# Patient Record
Sex: MALE | URBAN - METROPOLITAN AREA
[De-identification: names, ages, dates, MRNs, and addresses within clinical notes are randomized per-mention and may not be internally consistent; named-entity substitution may affect disease eponyms.]

---

## 2018-01-08 ENCOUNTER — EMERGENCY (EMERGENCY)
Facility: HOSPITAL | Age: 68
LOS: 1 days | Discharge: HOME | End: 2018-01-08
Attending: EMERGENCY MEDICINE
Payer: MEDICARE

## 2018-01-08 VITALS
HEART RATE: 80 BPM | DIASTOLIC BLOOD PRESSURE: 75 MMHG | OXYGEN SATURATION: 97 % | RESPIRATION RATE: 19 BRPM | SYSTOLIC BLOOD PRESSURE: 133 MMHG

## 2018-01-08 VITALS
TEMPERATURE: 97 F | HEIGHT: 71 IN | HEART RATE: 101 BPM | SYSTOLIC BLOOD PRESSURE: 110 MMHG | WEIGHT: 190.04 LBS | DIASTOLIC BLOOD PRESSURE: 74 MMHG | RESPIRATION RATE: 20 BRPM | OXYGEN SATURATION: 99 %

## 2018-01-08 DIAGNOSIS — Z98.89 OTHER SPECIFIED POSTPROCEDURAL STATES: Chronic | ICD-10-CM

## 2018-01-08 LAB
ALBUMIN SERPL ELPH-MCNC: 3.9 G/DL — SIGNIFICANT CHANGE UP (ref 3.5–5)
ALP SERPL-CCNC: 109 U/L — SIGNIFICANT CHANGE UP (ref 40–120)
ALT FLD-CCNC: 28 U/L DA — SIGNIFICANT CHANGE UP (ref 10–60)
ANION GAP SERPL CALC-SCNC: 10 MMOL/L — SIGNIFICANT CHANGE UP (ref 5–17)
AST SERPL-CCNC: 17 U/L — SIGNIFICANT CHANGE UP (ref 10–40)
BASOPHILS # BLD AUTO: 0.1 K/UL — SIGNIFICANT CHANGE UP (ref 0–0.2)
BASOPHILS NFR BLD AUTO: 0.8 % — SIGNIFICANT CHANGE UP (ref 0–2)
BILIRUB SERPL-MCNC: 0.3 MG/DL — SIGNIFICANT CHANGE UP (ref 0.2–1.2)
BUN SERPL-MCNC: 13 MG/DL — SIGNIFICANT CHANGE UP (ref 7–18)
CALCIUM SERPL-MCNC: 8.5 MG/DL — SIGNIFICANT CHANGE UP (ref 8.4–10.5)
CHLORIDE SERPL-SCNC: 107 MMOL/L — SIGNIFICANT CHANGE UP (ref 96–108)
CO2 SERPL-SCNC: 24 MMOL/L — SIGNIFICANT CHANGE UP (ref 22–31)
CREAT SERPL-MCNC: 0.7 MG/DL — SIGNIFICANT CHANGE UP (ref 0.5–1.3)
EOSINOPHIL # BLD AUTO: 0.1 K/UL — SIGNIFICANT CHANGE UP (ref 0–0.5)
EOSINOPHIL NFR BLD AUTO: 1.3 % — SIGNIFICANT CHANGE UP (ref 0–6)
GLUCOSE SERPL-MCNC: 91 MG/DL — SIGNIFICANT CHANGE UP (ref 70–99)
HCT VFR BLD CALC: 48.4 % — SIGNIFICANT CHANGE UP (ref 39–50)
HGB BLD-MCNC: 15.3 G/DL — SIGNIFICANT CHANGE UP (ref 13–17)
LYMPHOCYTES # BLD AUTO: 2.3 K/UL — SIGNIFICANT CHANGE UP (ref 1–3.3)
LYMPHOCYTES # BLD AUTO: 29.7 % — SIGNIFICANT CHANGE UP (ref 13–44)
MCHC RBC-ENTMCNC: 29.6 PG — SIGNIFICANT CHANGE UP (ref 27–34)
MCHC RBC-ENTMCNC: 31.7 GM/DL — LOW (ref 32–36)
MCV RBC AUTO: 93.2 FL — SIGNIFICANT CHANGE UP (ref 80–100)
MONOCYTES # BLD AUTO: 0.6 K/UL — SIGNIFICANT CHANGE UP (ref 0–0.9)
MONOCYTES NFR BLD AUTO: 7.5 % — SIGNIFICANT CHANGE UP (ref 2–14)
NEUTROPHILS # BLD AUTO: 4.8 K/UL — SIGNIFICANT CHANGE UP (ref 1.8–7.4)
NEUTROPHILS NFR BLD AUTO: 60.8 % — SIGNIFICANT CHANGE UP (ref 43–77)
NT-PROBNP SERPL-SCNC: 60 PG/ML — SIGNIFICANT CHANGE UP (ref 0–125)
PLATELET # BLD AUTO: 248 K/UL — SIGNIFICANT CHANGE UP (ref 150–400)
POTASSIUM SERPL-MCNC: 4 MMOL/L — SIGNIFICANT CHANGE UP (ref 3.5–5.3)
POTASSIUM SERPL-SCNC: 4 MMOL/L — SIGNIFICANT CHANGE UP (ref 3.5–5.3)
PROT SERPL-MCNC: 7.3 G/DL — SIGNIFICANT CHANGE UP (ref 6–8.3)
RBC # BLD: 5.19 M/UL — SIGNIFICANT CHANGE UP (ref 4.2–5.8)
RBC # FLD: 13.1 % — SIGNIFICANT CHANGE UP (ref 10.3–14.5)
SODIUM SERPL-SCNC: 141 MMOL/L — SIGNIFICANT CHANGE UP (ref 135–145)
WBC # BLD: 7.8 K/UL — SIGNIFICANT CHANGE UP (ref 3.8–10.5)
WBC # FLD AUTO: 7.8 K/UL — SIGNIFICANT CHANGE UP (ref 3.8–10.5)

## 2018-01-08 PROCEDURE — 71045 X-RAY EXAM CHEST 1 VIEW: CPT | Mod: 26

## 2018-01-08 PROCEDURE — 99283 EMERGENCY DEPT VISIT LOW MDM: CPT

## 2018-01-08 NOTE — ED PROVIDER NOTE - CHPI ED SYMPTOMS NEG
no diaphoresis/no hemoptysis/no headache/no edema/no body aches/no chest pain/no fever/no wheezing/no chills

## 2018-01-08 NOTE — ED ADULT NURSE NOTE - ED STAT RN HANDOFF DETAILS
endorsed to next nurse Doroteo endorsed to next nurse Doroteo - awaiting for ambulance to pick him up

## 2018-01-08 NOTE — ED PROVIDER NOTE - OBJECTIVE STATEMENT
"he was short of breat"  pt has history of cva and has HHA 24/7 at home.  pt has guardian ship no family  brought in today because had an episode of coughing and sob today  in ed pt states "i'm okay"  no cp no sob now  no pain anywhere HHA at bedside

## 2018-01-10 ENCOUNTER — EMERGENCY (EMERGENCY)
Facility: HOSPITAL | Age: 68
LOS: 1 days | Discharge: HOME | End: 2018-01-10
Attending: EMERGENCY MEDICINE
Payer: MEDICARE

## 2018-01-10 VITALS
SYSTOLIC BLOOD PRESSURE: 123 MMHG | RESPIRATION RATE: 18 BRPM | DIASTOLIC BLOOD PRESSURE: 71 MMHG | TEMPERATURE: 98 F | HEART RATE: 97 BPM | OXYGEN SATURATION: 96 %

## 2018-01-10 VITALS
OXYGEN SATURATION: 98 % | TEMPERATURE: 98 F | SYSTOLIC BLOOD PRESSURE: 142 MMHG | HEART RATE: 82 BPM | RESPIRATION RATE: 18 BRPM | DIASTOLIC BLOOD PRESSURE: 87 MMHG

## 2018-01-10 DIAGNOSIS — Z98.89 OTHER SPECIFIED POSTPROCEDURAL STATES: Chronic | ICD-10-CM

## 2018-01-10 PROCEDURE — 12011 RPR F/E/E/N/L/M 2.5 CM/<: CPT

## 2018-01-10 PROCEDURE — 99284 EMERGENCY DEPT VISIT MOD MDM: CPT | Mod: 25

## 2018-01-10 PROCEDURE — 71045 X-RAY EXAM CHEST 1 VIEW: CPT

## 2018-01-10 PROCEDURE — 80053 COMPREHEN METABOLIC PANEL: CPT

## 2018-01-10 PROCEDURE — 72125 CT NECK SPINE W/O DYE: CPT

## 2018-01-10 PROCEDURE — 85027 COMPLETE CBC AUTOMATED: CPT

## 2018-01-10 PROCEDURE — 72125 CT NECK SPINE W/O DYE: CPT | Mod: 26

## 2018-01-10 PROCEDURE — 70450 CT HEAD/BRAIN W/O DYE: CPT | Mod: 26

## 2018-01-10 PROCEDURE — 83880 ASSAY OF NATRIURETIC PEPTIDE: CPT

## 2018-01-10 PROCEDURE — 72170 X-RAY EXAM OF PELVIS: CPT

## 2018-01-10 PROCEDURE — 90715 TDAP VACCINE 7 YRS/> IM: CPT

## 2018-01-10 PROCEDURE — 72170 X-RAY EXAM OF PELVIS: CPT | Mod: 26

## 2018-01-10 PROCEDURE — 36000 PLACE NEEDLE IN VEIN: CPT

## 2018-01-10 PROCEDURE — 82962 GLUCOSE BLOOD TEST: CPT

## 2018-01-10 PROCEDURE — 93005 ELECTROCARDIOGRAM TRACING: CPT

## 2018-01-10 PROCEDURE — 71045 X-RAY EXAM CHEST 1 VIEW: CPT | Mod: 26

## 2018-01-10 PROCEDURE — 70450 CT HEAD/BRAIN W/O DYE: CPT

## 2018-01-10 PROCEDURE — 90471 IMMUNIZATION ADMIN: CPT

## 2018-01-10 RX ORDER — TETANUS TOXOID, REDUCED DIPHTHERIA TOXOID AND ACELLULAR PERTUSSIS VACCINE, ADSORBED 5; 2.5; 8; 8; 2.5 [IU]/.5ML; [IU]/.5ML; UG/.5ML; UG/.5ML; UG/.5ML
0.5 SUSPENSION INTRAMUSCULAR ONCE
Qty: 0 | Refills: 0 | Status: COMPLETED | OUTPATIENT
Start: 2018-01-10 | End: 2018-01-10

## 2018-01-10 RX ADMIN — TETANUS TOXOID, REDUCED DIPHTHERIA TOXOID AND ACELLULAR PERTUSSIS VACCINE, ADSORBED 0.5 MILLILITER(S): 5; 2.5; 8; 8; 2.5 SUSPENSION INTRAMUSCULAR at 15:32

## 2018-01-10 NOTE — ED PROVIDER NOTE - PROGRESS NOTE DETAILS
Maza: lac repaired.  wound care instructions given.  ct head neg/xr neg.  pt tolerated puree food in ed. ekg no arrhythmia  dx mechanical fall with scalp laceration. wound care instructions with suture removal in 7 days. HHA informed. ambulette ordered.

## 2018-01-10 NOTE — ED PROVIDER NOTE - MEDICAL DECISION MAKING DETAILS
Dry/Warm
67 yr old male from home with hx of CVA and rt eye blindness presents to ed with HHA after being found on floor next to bed.  pt was dc from ED [place] 1/8/18 late night.  sustained lac to left frontal forehead today. HHA at bedside states pt at baseline- slow to respond.  pt offers no complains except for pain at left frontal forehead from lac.    pt with fall now forehead laceration. will repair and obtai nct head, cervical, cxr and pelvis.

## 2018-01-10 NOTE — ED ADULT NURSE NOTE - NSHISCREENINGQ1_ED_A_ED
Date of Surgery Update: Bull Bateman was seen and examined. History and physical has been reviewed. The patient has been examined.  There have been no significant clinical changes since the completion of the originally dated History and Physical.    Signed By: Demond Edwards MD     January 17, 2017 8:36 AM
No

## 2018-01-10 NOTE — ED PROVIDER NOTE - OBJECTIVE STATEMENT
67 yr old male from home with hx of CVA and rt eye blindness presents to ed with HHA after being found on floor next to bed.  pt was dc from ED [place] 1/8/18 late night.  sustained lac to left frontal forehead today. HHA at bedside states pt at baseline- slow to respond.  pt offers no complains except for pain at left frontal forehead from lac.

## 2018-01-10 NOTE — ED PROVIDER NOTE - NEUROLOGICAL, MLM
Alert, moving all extremity, right arm slightly weaker, Alert, moving all extremity, right arm slightly weaker, otherwise moving all extremity

## 2018-01-10 NOTE — ED PROVIDER NOTE - EYES, MLM
Clear bilaterally, pupils equal, round and reactive to light. Clear bilaterally, pupils equal, round and reactive to light. right eye blind

## 2018-01-10 NOTE — ED ADULT NURSE NOTE - OBJECTIVE STATEMENT
as per aide at bedside she step a way from his side to get something and patient fell out of the bed this morning hitting his head , no LOC

## 2018-01-30 ENCOUNTER — EMERGENCY (EMERGENCY)
Facility: HOSPITAL | Age: 68
LOS: 1 days | Discharge: HOME | End: 2018-01-30
Attending: EMERGENCY MEDICINE
Payer: MEDICARE

## 2018-01-30 VITALS
OXYGEN SATURATION: 99 % | SYSTOLIC BLOOD PRESSURE: 158 MMHG | HEART RATE: 78 BPM | HEIGHT: 73 IN | TEMPERATURE: 98 F | WEIGHT: 160.06 LBS | RESPIRATION RATE: 16 BRPM | DIASTOLIC BLOOD PRESSURE: 98 MMHG

## 2018-01-30 DIAGNOSIS — Z98.89 OTHER SPECIFIED POSTPROCEDURAL STATES: Chronic | ICD-10-CM

## 2018-01-30 LAB
ANION GAP SERPL CALC-SCNC: 5 MMOL/L — SIGNIFICANT CHANGE UP (ref 5–17)
BASOPHILS # BLD AUTO: 0.1 K/UL — SIGNIFICANT CHANGE UP (ref 0–0.2)
BASOPHILS NFR BLD AUTO: 0.9 % — SIGNIFICANT CHANGE UP (ref 0–2)
BUN SERPL-MCNC: 15 MG/DL — SIGNIFICANT CHANGE UP (ref 7–18)
CALCIUM SERPL-MCNC: 8.9 MG/DL — SIGNIFICANT CHANGE UP (ref 8.4–10.5)
CHLORIDE SERPL-SCNC: 110 MMOL/L — HIGH (ref 96–108)
CO2 SERPL-SCNC: 28 MMOL/L — SIGNIFICANT CHANGE UP (ref 22–31)
CREAT SERPL-MCNC: 0.74 MG/DL — SIGNIFICANT CHANGE UP (ref 0.5–1.3)
EOSINOPHIL # BLD AUTO: 0.1 K/UL — SIGNIFICANT CHANGE UP (ref 0–0.5)
EOSINOPHIL NFR BLD AUTO: 1.3 % — SIGNIFICANT CHANGE UP (ref 0–6)
GLUCOSE SERPL-MCNC: 92 MG/DL — SIGNIFICANT CHANGE UP (ref 70–99)
HCT VFR BLD CALC: 43.6 % — SIGNIFICANT CHANGE UP (ref 39–50)
HGB BLD-MCNC: 14.4 G/DL — SIGNIFICANT CHANGE UP (ref 13–17)
LACTATE SERPL-SCNC: 0.9 MMOL/L — SIGNIFICANT CHANGE UP (ref 0.7–2)
LYMPHOCYTES # BLD AUTO: 2.6 K/UL — SIGNIFICANT CHANGE UP (ref 1–3.3)
LYMPHOCYTES # BLD AUTO: 31.4 % — SIGNIFICANT CHANGE UP (ref 13–44)
MCHC RBC-ENTMCNC: 31 PG — SIGNIFICANT CHANGE UP (ref 27–34)
MCHC RBC-ENTMCNC: 33 GM/DL — SIGNIFICANT CHANGE UP (ref 32–36)
MCV RBC AUTO: 93.9 FL — SIGNIFICANT CHANGE UP (ref 80–100)
MONOCYTES # BLD AUTO: 0.5 K/UL — SIGNIFICANT CHANGE UP (ref 0–0.9)
MONOCYTES NFR BLD AUTO: 6.2 % — SIGNIFICANT CHANGE UP (ref 2–14)
NEUTROPHILS # BLD AUTO: 5.1 K/UL — SIGNIFICANT CHANGE UP (ref 1.8–7.4)
NEUTROPHILS NFR BLD AUTO: 60.2 % — SIGNIFICANT CHANGE UP (ref 43–77)
PLATELET # BLD AUTO: 222 K/UL — SIGNIFICANT CHANGE UP (ref 150–400)
POTASSIUM SERPL-MCNC: 4.4 MMOL/L — SIGNIFICANT CHANGE UP (ref 3.5–5.3)
POTASSIUM SERPL-SCNC: 4.4 MMOL/L — SIGNIFICANT CHANGE UP (ref 3.5–5.3)
RBC # BLD: 4.64 M/UL — SIGNIFICANT CHANGE UP (ref 4.2–5.8)
RBC # FLD: 13.5 % — SIGNIFICANT CHANGE UP (ref 10.3–14.5)
SODIUM SERPL-SCNC: 143 MMOL/L — SIGNIFICANT CHANGE UP (ref 135–145)
WBC # BLD: 8.4 K/UL — SIGNIFICANT CHANGE UP (ref 3.8–10.5)
WBC # FLD AUTO: 8.4 K/UL — SIGNIFICANT CHANGE UP (ref 3.8–10.5)

## 2018-01-30 PROCEDURE — 99285 EMERGENCY DEPT VISIT HI MDM: CPT

## 2018-01-30 PROCEDURE — 11720 DEBRIDE NAIL 1-5: CPT

## 2018-01-30 PROCEDURE — 99284 EMERGENCY DEPT VISIT MOD MDM: CPT | Mod: 25

## 2018-01-30 PROCEDURE — 73620 X-RAY EXAM OF FOOT: CPT

## 2018-01-30 PROCEDURE — 80048 BASIC METABOLIC PNL TOTAL CA: CPT

## 2018-01-30 PROCEDURE — 85027 COMPLETE CBC AUTOMATED: CPT

## 2018-01-30 PROCEDURE — 73620 X-RAY EXAM OF FOOT: CPT | Mod: 26,LT

## 2018-01-30 PROCEDURE — 83605 ASSAY OF LACTIC ACID: CPT

## 2018-01-30 NOTE — CONSULT NOTE ADULT - SUBJECTIVE AND OBJECTIVE BOX
S : 67y year old Male seen at bedside for left toe pain. Patient has a history of Parkinsons according to aide and is a poor historian. According to the aide, patients toe was seen to be covered in blood on Saturday. Patients aide states that he bumped his toe and since then has had brusing and blood at the toe. Patients aide is unable to give any other history as far as medical history is concerned     Chief Complaint : Patient is a 67y old  Male who presents with a chief complaint of   HPI : HPI: Left hallux pain       Patient admits to  (-) Fevers, (-) Chills, (-) Nausea, (-) Vomiting, (-) Shortness of Breath      PMH: Blindness of right eye  CVA (cerebral vascular accident)    PSH:History of laminectomy      Allergies:No Known Allergies      Labs:      WBC Trend  7.8 Date (01-08 @ 16:42)      Chem              T(F): 98.5 (01-30-18 @ 15:08), Max: 98.5 (01-30-18 @ 15:08)  HR: 78 (01-30-18 @ 15:08) (78 - 78)  BP: 158/98 (01-30-18 @ 15:08) (158/98 - 158/98)  RR: 16 (01-30-18 @ 15:08) (16 - 16)  SpO2: 99% (01-30-18 @ 15:08) (99% - 99%)  Wt(kg): --    O:   General: Pleasant  male NAD & AOX3.    Integument:  Skin warm, dry and supple bilateral.    Left hallux:  laceration at distal nail with underlying hematoma present,  + edema, - garry-wound erythema, + purulence, - fluctuance, - tracking/tunneling, - probe to bone.   Vascular: Dorsalis Pedis and Posterior Tibial pulses 1/4 bilaterally.  Capillary re-fill time less then 3 seconds digits 1-5 bilateral.    Neuro: Protective sensation intact/diminished to the level of the digits bilateral.  MSK: Muscle strength 5/5 all major muscle groups bilateral.  Deformity:  A: Left hallux distal laceration       P:   Chart reviewed and Patient evaluated  Discussed diagnosis and treatment with patient  Wound flush with normal saline  Mycotic toenail debrided to adequate length   Applied  dry sterile dressing  X-rays reviewed   Recommend PO antibiotics   Offloading to bilateral Heels.   Discussed importance of daily foot examinations and proper shoe gear and to importance of lower Fasting Blood Glucose levels.   Follow up in Podiatry clinic   Discussed with Dr. Hou

## 2018-01-30 NOTE — ED PROVIDER NOTE - OBJECTIVE STATEMENT
66 y/o M pt w/ PMHx of CVA and legally blind presents to ED c/o L 1st toe infection. Pt here w/ visiting nurse and as per nurse pt has been c/o increased pain which is why he was brought in  for evaluation. Pt denies fever, chills, or any other complaints. NKDA.

## 2018-01-30 NOTE — ED ADULT NURSE NOTE - OBJECTIVE STATEMENT
Pt presented to ED c/o Left great toe infection, was seen by ED physician and podiatrist, labs done and xray to left great toe

## 2018-06-05 ENCOUNTER — INPATIENT (INPATIENT)
Facility: HOSPITAL | Age: 68
LOS: 7 days | Discharge: HOME | DRG: 392 | End: 2018-06-13
Attending: INTERNAL MEDICINE | Admitting: INTERNAL MEDICINE
Payer: MEDICARE

## 2018-06-05 VITALS
HEIGHT: 71 IN | OXYGEN SATURATION: 99 % | WEIGHT: 179.9 LBS | TEMPERATURE: 99 F | SYSTOLIC BLOOD PRESSURE: 132 MMHG | HEART RATE: 100 BPM | RESPIRATION RATE: 16 BRPM | DIASTOLIC BLOOD PRESSURE: 78 MMHG

## 2018-06-05 DIAGNOSIS — Z98.89 OTHER SPECIFIED POSTPROCEDURAL STATES: Chronic | ICD-10-CM

## 2018-06-05 DIAGNOSIS — K59.00 CONSTIPATION, UNSPECIFIED: ICD-10-CM

## 2018-06-05 LAB
ALBUMIN SERPL ELPH-MCNC: 3.9 G/DL — SIGNIFICANT CHANGE UP (ref 3.5–5)
ALP SERPL-CCNC: 102 U/L — SIGNIFICANT CHANGE UP (ref 40–120)
ALT FLD-CCNC: 24 U/L DA — SIGNIFICANT CHANGE UP (ref 10–60)
ANION GAP SERPL CALC-SCNC: 6 MMOL/L — SIGNIFICANT CHANGE UP (ref 5–17)
AST SERPL-CCNC: 13 U/L — SIGNIFICANT CHANGE UP (ref 10–40)
BASOPHILS # BLD AUTO: 0 K/UL — SIGNIFICANT CHANGE UP (ref 0–0.2)
BASOPHILS NFR BLD AUTO: 0.8 % — SIGNIFICANT CHANGE UP (ref 0–2)
BILIRUB SERPL-MCNC: 0.4 MG/DL — SIGNIFICANT CHANGE UP (ref 0.2–1.2)
BUN SERPL-MCNC: 14 MG/DL — SIGNIFICANT CHANGE UP (ref 7–18)
CALCIUM SERPL-MCNC: 8.8 MG/DL — SIGNIFICANT CHANGE UP (ref 8.4–10.5)
CHLORIDE SERPL-SCNC: 112 MMOL/L — HIGH (ref 96–108)
CO2 SERPL-SCNC: 28 MMOL/L — SIGNIFICANT CHANGE UP (ref 22–31)
CREAT SERPL-MCNC: 0.81 MG/DL — SIGNIFICANT CHANGE UP (ref 0.5–1.3)
EOSINOPHIL # BLD AUTO: 0.1 K/UL — SIGNIFICANT CHANGE UP (ref 0–0.5)
EOSINOPHIL NFR BLD AUTO: 1.1 % — SIGNIFICANT CHANGE UP (ref 0–6)
GLUCOSE SERPL-MCNC: 105 MG/DL — HIGH (ref 70–99)
HCT VFR BLD CALC: 45.5 % — SIGNIFICANT CHANGE UP (ref 39–50)
HGB BLD-MCNC: 14.7 G/DL — SIGNIFICANT CHANGE UP (ref 13–17)
LIDOCAIN IGE QN: 286 U/L — SIGNIFICANT CHANGE UP (ref 73–393)
LYMPHOCYTES # BLD AUTO: 2.1 K/UL — SIGNIFICANT CHANGE UP (ref 1–3.3)
LYMPHOCYTES # BLD AUTO: 32.6 % — SIGNIFICANT CHANGE UP (ref 13–44)
MCHC RBC-ENTMCNC: 29.8 PG — SIGNIFICANT CHANGE UP (ref 27–34)
MCHC RBC-ENTMCNC: 32.4 GM/DL — SIGNIFICANT CHANGE UP (ref 32–36)
MCV RBC AUTO: 92.1 FL — SIGNIFICANT CHANGE UP (ref 80–100)
MONOCYTES # BLD AUTO: 0.6 K/UL — SIGNIFICANT CHANGE UP (ref 0–0.9)
MONOCYTES NFR BLD AUTO: 9.4 % — SIGNIFICANT CHANGE UP (ref 2–14)
NEUTROPHILS # BLD AUTO: 3.5 K/UL — SIGNIFICANT CHANGE UP (ref 1.8–7.4)
NEUTROPHILS NFR BLD AUTO: 56 % — SIGNIFICANT CHANGE UP (ref 43–77)
PLATELET # BLD AUTO: 230 K/UL — SIGNIFICANT CHANGE UP (ref 150–400)
POTASSIUM SERPL-MCNC: 3.7 MMOL/L — SIGNIFICANT CHANGE UP (ref 3.5–5.3)
POTASSIUM SERPL-SCNC: 3.7 MMOL/L — SIGNIFICANT CHANGE UP (ref 3.5–5.3)
PROT SERPL-MCNC: 7.3 G/DL — SIGNIFICANT CHANGE UP (ref 6–8.3)
RBC # BLD: 4.94 M/UL — SIGNIFICANT CHANGE UP (ref 4.2–5.8)
RBC # FLD: 13 % — SIGNIFICANT CHANGE UP (ref 10.3–14.5)
SODIUM SERPL-SCNC: 146 MMOL/L — HIGH (ref 135–145)
WBC # BLD: 6.3 K/UL — SIGNIFICANT CHANGE UP (ref 3.8–10.5)
WBC # FLD AUTO: 6.3 K/UL — SIGNIFICANT CHANGE UP (ref 3.8–10.5)

## 2018-06-05 PROCEDURE — 99285 EMERGENCY DEPT VISIT HI MDM: CPT

## 2018-06-05 PROCEDURE — 74177 CT ABD & PELVIS W/CONTRAST: CPT | Mod: 26

## 2018-06-05 RX ORDER — SODIUM CHLORIDE 9 MG/ML
1000 INJECTION INTRAMUSCULAR; INTRAVENOUS; SUBCUTANEOUS ONCE
Qty: 0 | Refills: 0 | Status: COMPLETED | OUTPATIENT
Start: 2018-06-05 | End: 2018-06-05

## 2018-06-05 RX ORDER — POLYETHYLENE GLYCOL 3350 17 G/17G
17 POWDER, FOR SOLUTION ORAL ONCE
Qty: 0 | Refills: 0 | Status: COMPLETED | OUTPATIENT
Start: 2018-06-05 | End: 2018-06-05

## 2018-06-05 RX ORDER — ACETAMINOPHEN 500 MG
1000 TABLET ORAL ONCE
Qty: 0 | Refills: 0 | Status: COMPLETED | OUTPATIENT
Start: 2018-06-05 | End: 2018-06-05

## 2018-06-05 RX ADMIN — SODIUM CHLORIDE 1000 MILLILITER(S): 9 INJECTION INTRAMUSCULAR; INTRAVENOUS; SUBCUTANEOUS at 17:35

## 2018-06-05 RX ADMIN — Medication 400 MILLIGRAM(S): at 17:35

## 2018-06-05 RX ADMIN — Medication 1 ENEMA: at 21:50

## 2018-06-05 RX ADMIN — Medication 1000 MILLIGRAM(S): at 18:05

## 2018-06-05 NOTE — ED PROVIDER NOTE - PMH
Blindness of right eye    CVA (cerebral vascular accident)    HLD (hyperlipidemia)    HTN (hypertension)    Parkinson's disease

## 2018-06-05 NOTE — ED ADULT NURSE NOTE - ED STAT RN HANDOFF DETAILS
pt.remained   stable.denies pain.  admitted to medicine  for  constipation.medicated with  fleet enema 1 dose,.pending result.  transfer to holding area,report given to sara lewis.pt.not  in distress

## 2018-06-05 NOTE — ED PROVIDER NOTE - OBJECTIVE STATEMENT
66 y/o M pt with PMHx of Blindness of the R Eye, CVA (non-verbal at baseline), HTN, HLD, and Parkinson's Disease and PSHx of Laminectomy BIB home health aide to ED with reported abdominal pain x4 days. Pt's home health aide reports pt's sx's began x4 days ago; home health aide notified her agency at the time but nothing was done, and upon returning to work with the pt yesterday, noticed pt was still experiencing abdominal pain and decided to bring pt into the ED for evaluation. In ED, pt is pointing to the abdomen when asked what hurts. Home health aide denies pt fever, chills, vomiting, diarrhea, or any other complaints. Extended Hx and HPI limited due to pt's condition (non-verbal; Parkinson's disease). Medications: Amlodipine, Aspirin, Atorvastatin, Carbidopa, Levodopa, Colace, Donepezil, Memantine, Miralax, Omeprazole, Plavix, Senna, Trazodone. NKDA.

## 2018-06-06 DIAGNOSIS — K59.00 CONSTIPATION, UNSPECIFIED: ICD-10-CM

## 2018-06-06 DIAGNOSIS — E78.5 HYPERLIPIDEMIA, UNSPECIFIED: ICD-10-CM

## 2018-06-06 DIAGNOSIS — Z29.9 ENCOUNTER FOR PROPHYLACTIC MEASURES, UNSPECIFIED: ICD-10-CM

## 2018-06-06 DIAGNOSIS — G20 PARKINSON'S DISEASE: ICD-10-CM

## 2018-06-06 DIAGNOSIS — I10 ESSENTIAL (PRIMARY) HYPERTENSION: ICD-10-CM

## 2018-06-06 DIAGNOSIS — I63.9 CEREBRAL INFARCTION, UNSPECIFIED: ICD-10-CM

## 2018-06-06 LAB
ANION GAP SERPL CALC-SCNC: 3 MMOL/L — LOW (ref 5–17)
BUN SERPL-MCNC: 11 MG/DL — SIGNIFICANT CHANGE UP (ref 7–18)
CALCIUM SERPL-MCNC: 8.8 MG/DL — SIGNIFICANT CHANGE UP (ref 8.4–10.5)
CHLORIDE SERPL-SCNC: 113 MMOL/L — HIGH (ref 96–108)
CHOLEST SERPL-MCNC: 79 MG/DL — SIGNIFICANT CHANGE UP (ref 10–199)
CO2 SERPL-SCNC: 29 MMOL/L — SIGNIFICANT CHANGE UP (ref 22–31)
CREAT SERPL-MCNC: 0.85 MG/DL — SIGNIFICANT CHANGE UP (ref 0.5–1.3)
FOLATE SERPL-MCNC: 8 NG/ML — SIGNIFICANT CHANGE UP
GLUCOSE SERPL-MCNC: 86 MG/DL — SIGNIFICANT CHANGE UP (ref 70–99)
HBA1C BLD-MCNC: 5.2 % — SIGNIFICANT CHANGE UP (ref 4–5.6)
HCT VFR BLD CALC: 44 % — SIGNIFICANT CHANGE UP (ref 39–50)
HDLC SERPL-MCNC: 44 MG/DL — SIGNIFICANT CHANGE UP (ref 40–125)
HGB BLD-MCNC: 14.4 G/DL — SIGNIFICANT CHANGE UP (ref 13–17)
LIPID PNL WITH DIRECT LDL SERPL: 20 MG/DL — SIGNIFICANT CHANGE UP
MAGNESIUM SERPL-MCNC: 2.6 MG/DL — SIGNIFICANT CHANGE UP (ref 1.6–2.6)
MCHC RBC-ENTMCNC: 30.5 PG — SIGNIFICANT CHANGE UP (ref 27–34)
MCHC RBC-ENTMCNC: 32.6 GM/DL — SIGNIFICANT CHANGE UP (ref 32–36)
MCV RBC AUTO: 93.3 FL — SIGNIFICANT CHANGE UP (ref 80–100)
PHOSPHATE SERPL-MCNC: 3.2 MG/DL — SIGNIFICANT CHANGE UP (ref 2.5–4.5)
PLATELET # BLD AUTO: 211 K/UL — SIGNIFICANT CHANGE UP (ref 150–400)
POTASSIUM SERPL-MCNC: 4.4 MMOL/L — SIGNIFICANT CHANGE UP (ref 3.5–5.3)
POTASSIUM SERPL-SCNC: 4.4 MMOL/L — SIGNIFICANT CHANGE UP (ref 3.5–5.3)
RBC # BLD: 4.72 M/UL — SIGNIFICANT CHANGE UP (ref 4.2–5.8)
RBC # FLD: 13.3 % — SIGNIFICANT CHANGE UP (ref 10.3–14.5)
SODIUM SERPL-SCNC: 145 MMOL/L — SIGNIFICANT CHANGE UP (ref 135–145)
TOTAL CHOLESTEROL/HDL RATIO MEASUREMENT: 1.8 RATIO — LOW (ref 3.4–9.6)
TRIGL SERPL-MCNC: 73 MG/DL — SIGNIFICANT CHANGE UP (ref 10–149)
TSH SERPL-MCNC: 0.78 UU/ML — SIGNIFICANT CHANGE UP (ref 0.34–4.82)
VIT B12 SERPL-MCNC: 540 PG/ML — SIGNIFICANT CHANGE UP (ref 232–1245)
WBC # BLD: 5.6 K/UL — SIGNIFICANT CHANGE UP (ref 3.8–10.5)
WBC # FLD AUTO: 5.6 K/UL — SIGNIFICANT CHANGE UP (ref 3.8–10.5)

## 2018-06-06 RX ORDER — POLYETHYLENE GLYCOL 3350 17 G/17G
17 POWDER, FOR SOLUTION ORAL DAILY
Qty: 0 | Refills: 0 | Status: DISCONTINUED | OUTPATIENT
Start: 2018-06-07 | End: 2018-06-13

## 2018-06-06 RX ORDER — MEMANTINE HYDROCHLORIDE 10 MG/1
5 TABLET ORAL AT BEDTIME
Qty: 0 | Refills: 0 | Status: DISCONTINUED | OUTPATIENT
Start: 2018-06-06 | End: 2018-06-13

## 2018-06-06 RX ORDER — ATORVASTATIN CALCIUM 80 MG/1
40 TABLET, FILM COATED ORAL AT BEDTIME
Qty: 0 | Refills: 0 | Status: DISCONTINUED | OUTPATIENT
Start: 2018-06-06 | End: 2018-06-13

## 2018-06-06 RX ORDER — DOCUSATE SODIUM 100 MG
100 CAPSULE ORAL
Qty: 0 | Refills: 0 | Status: DISCONTINUED | OUTPATIENT
Start: 2018-06-06 | End: 2018-06-13

## 2018-06-06 RX ORDER — ASPIRIN/CALCIUM CARB/MAGNESIUM 324 MG
81 TABLET ORAL DAILY
Qty: 0 | Refills: 0 | Status: DISCONTINUED | OUTPATIENT
Start: 2018-06-06 | End: 2018-06-13

## 2018-06-06 RX ORDER — SENNA PLUS 8.6 MG/1
2 TABLET ORAL AT BEDTIME
Qty: 0 | Refills: 0 | Status: DISCONTINUED | OUTPATIENT
Start: 2018-06-06 | End: 2018-06-13

## 2018-06-06 RX ORDER — AMLODIPINE BESYLATE 2.5 MG/1
5 TABLET ORAL DAILY
Qty: 0 | Refills: 0 | Status: DISCONTINUED | OUTPATIENT
Start: 2018-06-06 | End: 2018-06-13

## 2018-06-06 RX ORDER — MULTIVIT WITH MIN/MFOLATE/K2 340-15/3 G
300 POWDER (GRAM) ORAL ONCE
Qty: 0 | Refills: 0 | Status: COMPLETED | OUTPATIENT
Start: 2018-06-06 | End: 2018-06-06

## 2018-06-06 RX ORDER — CARBIDOPA AND LEVODOPA 25; 100 MG/1; MG/1
1 TABLET ORAL
Qty: 0 | Refills: 0 | Status: DISCONTINUED | OUTPATIENT
Start: 2018-06-06 | End: 2018-06-13

## 2018-06-06 RX ORDER — CLOPIDOGREL BISULFATE 75 MG/1
75 TABLET, FILM COATED ORAL DAILY
Qty: 0 | Refills: 0 | Status: DISCONTINUED | OUTPATIENT
Start: 2018-06-06 | End: 2018-06-13

## 2018-06-06 RX ORDER — PANTOPRAZOLE SODIUM 20 MG/1
40 TABLET, DELAYED RELEASE ORAL
Qty: 0 | Refills: 0 | Status: DISCONTINUED | OUTPATIENT
Start: 2018-06-06 | End: 2018-06-13

## 2018-06-06 RX ORDER — TRAZODONE HCL 50 MG
50 TABLET ORAL DAILY
Qty: 0 | Refills: 0 | Status: DISCONTINUED | OUTPATIENT
Start: 2018-06-06 | End: 2018-06-13

## 2018-06-06 RX ORDER — METOPROLOL TARTRATE 50 MG
25 TABLET ORAL
Qty: 0 | Refills: 0 | Status: DISCONTINUED | OUTPATIENT
Start: 2018-06-06 | End: 2018-06-06

## 2018-06-06 RX ADMIN — Medication 50 MILLIGRAM(S): at 18:00

## 2018-06-06 RX ADMIN — Medication 100 MILLIGRAM(S): at 18:00

## 2018-06-06 RX ADMIN — MEMANTINE HYDROCHLORIDE 5 MILLIGRAM(S): 10 TABLET ORAL at 21:21

## 2018-06-06 RX ADMIN — AMLODIPINE BESYLATE 5 MILLIGRAM(S): 2.5 TABLET ORAL at 06:03

## 2018-06-06 RX ADMIN — CARBIDOPA AND LEVODOPA 1 TABLET(S): 25; 100 TABLET ORAL at 06:03

## 2018-06-06 RX ADMIN — Medication 81 MILLIGRAM(S): at 13:37

## 2018-06-06 RX ADMIN — CARBIDOPA AND LEVODOPA 1 TABLET(S): 25; 100 TABLET ORAL at 18:00

## 2018-06-06 RX ADMIN — CLOPIDOGREL BISULFATE 75 MILLIGRAM(S): 75 TABLET, FILM COATED ORAL at 13:37

## 2018-06-06 RX ADMIN — Medication 300 MILLILITER(S): at 13:37

## 2018-06-06 RX ADMIN — CARBIDOPA AND LEVODOPA 1 TABLET(S): 25; 100 TABLET ORAL at 13:54

## 2018-06-06 RX ADMIN — Medication 100 MILLIGRAM(S): at 06:03

## 2018-06-06 RX ADMIN — SENNA PLUS 2 TABLET(S): 8.6 TABLET ORAL at 21:21

## 2018-06-06 RX ADMIN — ATORVASTATIN CALCIUM 40 MILLIGRAM(S): 80 TABLET, FILM COATED ORAL at 21:21

## 2018-06-06 RX ADMIN — PANTOPRAZOLE SODIUM 40 MILLIGRAM(S): 20 TABLET, DELAYED RELEASE ORAL at 06:03

## 2018-06-06 NOTE — H&P ADULT - NSHPPHYSICALEXAM_GEN_ALL_CORE
PHYSICAL EXAM:  GENERAL: NAD, non verbal   HEENT:  Atraumatic, Normocephalic,  EOMI, PERRLA, conjunctiva and sclera clear  NECK: Supple, No JVD  LUNG: Clear to auscultation bilaterally; No wheeze; No crackles; No accessory muscles used  CVS: Regular rate and rhythm, no RMG  ABDOMEN: Soft, Nontender, Nondistended; Bowel sounds present; No guarding  : no suprapubic pain   EXTREMITIES:  2+ Peripheral Pulses, No cyanosis or edema  SKIN: No rashes or lesions  Neuro: AAOx3, contracted lower extremities

## 2018-06-06 NOTE — H&P ADULT - PROBLEM SELECTOR PLAN 1
presents with abdominal pain   ABd CT shows Large amount of stool throughout the colon   could be 2/2 advance Parkinson disease   s/p fleet enema   pt had large BM after enema  will start Miralax daily with senna and colace presents with abdominal pain   ABd CT shows Large amount of stool throughout the colon   could be 2/2 advance Parkinson disease   NPO, start diet as abdominal pain improves   s/p fleet enema   pt had large BM after enema  will start Miralax daily with senna and colace presents with abdominal pain   ABd CT shows Large amount of stool throughout the colon   could be 2/2 advance Parkinson disease   NPO, start diet as abdominal pain improves   s/p fleet enema   pt had large BM after enema  will start Miralax daily with senna and colace  GI: Dr Barber

## 2018-06-06 NOTE — CONSULT NOTE ADULT - ASSESSMENT
1. Abdominal pain  2. Fecal impaction  3. R/o obstructing colorectal neoplasm    Suggestions:    1. Colonoscopy  2. Consider Fleet enema x 2 half hour apart  3. Mg citrate 8oz po half hour after second enema  4. Colonoscopy  5. Check CEA level  6. Avoid NSAID  7. DVT prophylaxis

## 2018-06-06 NOTE — ADVANCED PRACTICE NURSE CONSULT - RECOMMEDATIONS
-Clean the Bilateral heels with normal saline and apply skin prep to the surrounding skin  -Leave open to air  -Elevate/float the patients heels using heel protectors and reposition the patient Q 2hrs using wedges or pillows

## 2018-06-06 NOTE — CONSULT NOTE ADULT - SUBJECTIVE AND OBJECTIVE BOX
[  ] STAT REQUEST              [ X ] ROUTINE REQUEST    Patient is a 67 year old male with abdominal pain. GI consulted to evaluate.      HPI:  67 year old male with multiple medical problems presented with 4 days h/o abdominal pain. No nausea, vomiting, hematemesis, hematochezia, melena, fever, chills, chest pain, SOB, cough, hematuria, dysuria, or diarrhea reported.                 PAIN MANAGEMENT:  Pain Scale:                3-4 /10  Pain Location:  Diffuse abdominal pain    Prior Colonoscopy:  Unknown    PAST MEDICAL HISTORY  HLD    Parkinson's disease  Blindness of right eye  CVA   HTN         PAST SURGICAL HISTORY  History of laminectomy      Allergies    No Known Allergies          MEDICATIONS  (STANDING):  amLODIPine   Tablet 5 milliGRAM(s) Oral daily  aspirin  chewable 81 milliGRAM(s) Oral daily  atorvastatin 40 milliGRAM(s) Oral at bedtime  carbidopa/levodopa  25/100 1 Tablet(s) Oral <User Schedule>  clopidogrel Tablet 75 milliGRAM(s) Oral daily  docusate sodium 100 milliGRAM(s) Oral two times a day  memantine 5 milliGRAM(s) Oral at bedtime  pantoprazole    Tablet 40 milliGRAM(s) Oral before breakfast  senna 2 Tablet(s) Oral at bedtime  traZODone 50 milliGRAM(s) Oral daily         SOCIAL HISTORY  Advanced Directives:       [ X ] Full Code       [  ] DNR  Marital Status:         [  ] M      [ X ] S      [  ] D       [  ] W  Children:       [ X ] Yes      [  ] No  Occupation:        [  ] Employed       [ X ] Unemployed       [  ] Retired  Diet:       [ X ] Regular       [  ] PEG feeding          [  ] NG tube feeding  Drug Use:           [ X ] Patient denied          [  ] Yes  Alcohol:           [ X ] No             [  ] Yes (socially)         [  ] Yes (chronic)  Tobacco:           [  ] Yes           [ X ] No        FAMILY HISTORY  [X  ] Heart Disease            [  ] Diabetes             [  ] HTN             [  ] Colon Cancer             [  ] Stomach Cancer              [  ] Pancreatic Cancer        VITAL SIGNS   Vital Signs Last 24 Hrs  T(C): 36.8 (06 Jun 2018 13:49), Max: 37.2 (05 Jun 2018 19:12)  T(F): 98.2 (06 Jun 2018 13:49), Max: 99 (05 Jun 2018 19:12)  HR: 79 (06 Jun 2018 13:49) (62 - 98)  BP: 139/80 (06 Jun 2018 13:49) (122/77 - 149/78)  BP(mean): 18 (06 Jun 2018 13:49) (18 - 18)  RR: 18 (06 Jun 2018 13:49) (16 - 18)  SpO2: 97% (06 Jun 2018 13:49) (97% - 100%)            CBC Full  -  ( 06 Jun 2018 07:44 )  WBC Count : 5.6 K/uL  Hemoglobin : 14.4 g/dL  Hematocrit : 44.0 %  Platelet Count - Automated : 211 K/uL  Mean Cell Volume : 93.3 fl  Mean Cell Hemoglobin : 30.5 pg  Mean Cell Hemoglobin Concentration : 32.6 gm/dL     145  |  113<H>  |  11  ----------------------------<  86  4.4   |  29  |  0.85    Ca    8.8      06 Jun 2018 07:44  Phos  3.2     06-06  Mg     2.6     06-06    TPro  7.3  /  Alb  3.9  /  TBili  0.4  /  DBili  x   /  AST  13  /  ALT  24  /  AlkPhos  102  06-05    Urinalysis (05.18.16 @ 03:10)    pH Urine: 5.0    Glucose Qualitative, Urine: Negative    Blood, Urine: Negative    Color: Yellow    Urine Appearance: Clear    Bilirubin: Negative    Ketone - Urine: Negative    Specific Gravity: 1.025    Protein, Urine: Negative    Urobilinogen: Negative    Nitrite: Negative    Leukocyte Esterase Concentration: Negative      ECG  Ventricular Rate 102 BPM    Atrial Rate 102 BPM    P-R Interval 162 ms    QRS Duration 78 ms     ms    QTc 490 ms    P Axis 62 degrees    R Axis 59 degrees    T Axis 62 degrees    Diagnosis Line *** Poor data quality, interpretation may be adversely affected  Sinus tachycardia  Otherwise normal ECG      RADIOLOGY/IMAGING                EXAM:  CT ABDOMEN AND PELVIS IC                            PROCEDURE DATE:  06/05/2018          INTERPRETATION:  CLINICAL STATEMENT: abdominal pain    TECHNIQUE: CT of the abdomen and pelvis was performed with IV contrast.   Oral contrast  was not administered. Approximately 90 cc of Omnipaque 350   administered.    COMPARISON: None.    FINDINGS:    The lower chest is remarkable for pleural calcification at the left base   with focal linear parenchymal calcification as well.    The liver is unremarkable. The fluid-filled gallbladder is appreciated.   Calcified gallstones are seen.    The spleen, pancreas and adrenal glands are unremarkable.The kidneys are   remarkable for mild scarring at the left upper pole. The fluid-filled   bladder appears intact.    There is no bowel obstruction. A normal appendix is seen. Note is made of   interposition of colon between the right hepatic lobe and the diaphragm.   There is a large amount of stool throughout the colon which may indicate   constipation    There is no intraperitoneal free air.  There is no free fluid. The aorta   is not aneurysmal. There is moderate atherosclerotic calcification of the   abdominal aorta and its branches    There is no significant abdominal, retroperitoneal or pelvic   lymphadenopathy. The pelvic structures are unremarkable.    The osseous structures demonstrate degenerative changes in the spine.   There is a superior compression of the L1 vertebral body which is   indeterminate in age.    IMPRESSION: No acute pathology. Cholelithiasis  Large amount of stool throughout the colon may indicate constipation  Mild superior compression fracture of L1

## 2018-06-06 NOTE — PROGRESS NOTE ADULT - SUBJECTIVE AND OBJECTIVE BOX
NP Note discussed with  Primary Attending    Patient is a 67y old  Male who presents with a chief complaint of constipation and abdominal pain (06 Jun 2018 00:36)    68 y/o M pt with PMHx of Blindness of the R Eye, CVA (non-verbal at baseline), HTN, HLD, and Parkinson's Disease sent to ED by home health aide due to abdominal pain x4 days. Pt's home health aide reports pt's sx's began x4 days ago and abdominal CT was done which shows Large amount of stool throughout the colon may indicate constipation.      INTERVAL HPI/OVERNIGHT EVENTS: no new complaints. Pt abdomen benign s/p multiple large bowel mvmts.     MEDICATIONS  (STANDING):  amLODIPine   Tablet 5 milliGRAM(s) Oral daily  aspirin  chewable 81 milliGRAM(s) Oral daily  atorvastatin 40 milliGRAM(s) Oral at bedtime  carbidopa/levodopa  25/100 1 Tablet(s) Oral <User Schedule>  clopidogrel Tablet 75 milliGRAM(s) Oral daily  docusate sodium 100 milliGRAM(s) Oral two times a day  magnesium citrate Solution 300 milliLiter(s) Oral once  memantine 5 milliGRAM(s) Oral at bedtime  pantoprazole    Tablet 40 milliGRAM(s) Oral before breakfast  senna 2 Tablet(s) Oral at bedtime  traZODone 50 milliGRAM(s) Oral daily  __________________________________________________  Vital Signs Last 24 Hrs  T(C): 36.9 (06 Jun 2018 05:21), Max: 37.2 (05 Jun 2018 15:52)  T(F): 98.5 (06 Jun 2018 05:21), Max: 99 (05 Jun 2018 15:52)  HR: 82 (06 Jun 2018 05:21) (62 - 100)  BP: 149/78 (06 Jun 2018 05:21) (122/77 - 149/78)  BP(mean): --  RR: 16 (06 Jun 2018 05:21) (16 - 18)  SpO2: 98% (06 Jun 2018 05:21) (97% - 100%)    ________________________________________________  PHYSICAL EXAM:  GENERAL: NAD  HEENT: Normocephalic;  conjunctivae and sclerae clear; moist mucous membranes;   NECK : supple  CHEST/LUNG: Clear to auscultation bilaterally with good air entry   HEART: S1 S2  regular; no murmurs, gallops or rubs  ABDOMEN: Soft, Nontender, Nondistended; Bowel sounds present  EXTREMITIES: no cyanosis; no edema; no calf tenderness  SKIN: warm and dry; no rash  NERVOUS SYSTEM:  Awake and alert; Oriented  to place, person and time ; no new deficits    _________________________________________________  LABS:                        14.4   5.6   )-----------( 211      ( 06 Jun 2018 07:44 )             44.0     06-06    145  |  113<H>  |  11  ----------------------------<  86  4.4   |  29  |  0.85    Ca    8.8      06 Jun 2018 07:44  Phos  3.2     06-06  Mg     2.6     06-06    TPro  7.3  /  Alb  3.9  /  TBili  0.4  /  DBili  x   /  AST  13  /  ALT  24  /  AlkPhos  102  06-05        CAPILLARY BLOOD GLUCOSE            RADIOLOGY & ADDITIONAL TESTS:    Imaging Personally Reviewed:  YES/NO    Consultant(s) Notes Reviewed:   YES/ No    Care Discussed with Consultants :     Plan of care was discussed with patient and /or primary care giver; all questions and concerns were addressed and care was aligned with patient's wishes. NP Note discussed with  Primary Attending    Patient is a 67y old  Male who presents with a chief complaint of constipation and abdominal pain (06 Jun 2018 00:36)    68 y/o M pt with PMHx of Blindness of the R Eye, CVA (non-verbal at baseline), HTN, HLD, and Parkinson's Disease sent to ED by home health aide due to abdominal pain x4 days. Pt's home health aide reports pt's sx's began x4 days ago and abdominal CT was done which shows Large amount of stool throughout the colon may indicate constipation.      INTERVAL HPI/OVERNIGHT EVENTS: no new complaints. Pt abdomen benign s/p multiple large bowel mvmts.     MEDICATIONS  (STANDING):  amLODIPine   Tablet 5 milliGRAM(s) Oral daily  aspirin  chewable 81 milliGRAM(s) Oral daily  atorvastatin 40 milliGRAM(s) Oral at bedtime  carbidopa/levodopa  25/100 1 Tablet(s) Oral <User Schedule>  clopidogrel Tablet 75 milliGRAM(s) Oral daily  docusate sodium 100 milliGRAM(s) Oral two times a day  magnesium citrate Solution 300 milliLiter(s) Oral once  memantine 5 milliGRAM(s) Oral at bedtime  pantoprazole    Tablet 40 milliGRAM(s) Oral before breakfast  senna 2 Tablet(s) Oral at bedtime  traZODone 50 milliGRAM(s) Oral daily  __________________________________________________  Vital Signs Last 24 Hrs  T(C): 36.9 (06 Jun 2018 05:21), Max: 37.2 (05 Jun 2018 15:52)  T(F): 98.5 (06 Jun 2018 05:21), Max: 99 (05 Jun 2018 15:52)  HR: 82 (06 Jun 2018 05:21) (62 - 100)  BP: 149/78 (06 Jun 2018 05:21) (122/77 - 149/78)  BP(mean): --  RR: 16 (06 Jun 2018 05:21) (16 - 18)  SpO2: 98% (06 Jun 2018 05:21) (97% - 100%)    ________________________________________________  PHYSICAL EXAM:  GENERAL: NAD  HEENT: Normocephalic;  conjunctivae and sclerae clear; moist mucous membranes;   NECK : supple  CHEST/LUNG: Clear to auscultation bilaterally with good air entry   HEART: S1 S2  regular; no murmurs, gallops or rubs  ABDOMEN: Soft, Nontender, Nondistended; Bowel sounds present  EXTREMITIES: no cyanosis; no edema; no calf tenderness  SKIN: warm and dry; no rash  NERVOUS SYSTEM:  Awake and alert; Oriented  to place, person and time ; no new deficits    _________________________________________________  LABS:                        14.4   5.6   )-----------( 211      ( 06 Jun 2018 07:44 )             44.0     06-06    145  |  113<H>  |  11  ----------------------------<  86  4.4   |  29  |  0.85    Ca    8.8      06 Jun 2018 07:44  Phos  3.2     06-06  Mg     2.6     06-06    TPro  7.3  /  Alb  3.9  /  TBili  0.4  /  DBili  x   /  AST  13  /  ALT  24  /  AlkPhos  102  06-05    CAPILLARY BLOOD GLUCOSE    RADIOLOGY & ADDITIONAL TESTS:    Imaging Personally Reviewed:  YES    Consultant(s) Notes Reviewed:   YES    Care Discussed with Consultants :     Plan of care was discussed with patient and /or primary care giver; all questions and concerns were addressed and care was aligned with patient's wishes.

## 2018-06-06 NOTE — H&P ADULT - ASSESSMENT
68 y/o M pt with PMHx of Blindness of the R Eye, CVA (non-verbal at baseline), HTN, HLD, and Parkinson's Disease sent to ED by home health aide due to abdominal pain x4 days. Abdominal CT shows Large amount of stool throughout the colon may indicate constipation.

## 2018-06-06 NOTE — PROGRESS NOTE ADULT - PROBLEM SELECTOR PLAN 1
- Cont colace, senna, miralax daily for home  - Mag citrate x 1  - Pt having multiple bowel mvmts w/ benign abd exam  - Discussed w/ case  mgmt and family discharge in AM tomorrow pending PT eval ( pt nonambulatory but tx from bed to chair w/ 1 assist) - Cont colace, senna, miralax daily for home  - Mag citrate x 1  - Pt having multiple bowel mvmts w/ benign abd exam  - Discussed w/ case  mgmt and family discharge in AM tomorrow pending PT eval ( pt nonambulatory but tx from bed to chair w/ 1 assist) and GI consult. Dr. Barber made aware.

## 2018-06-06 NOTE — H&P ADULT - HISTORY OF PRESENT ILLNESS
68 y/o M pt with PMHx of Blindness of the R Eye, CVA (non-verbal at baseline), HTN, HLD, and Parkinson's Disease sent to ED by home health aide due to abdominal pain x4 days. Pt's home health aide reports pt's sx's began x4 days ago; home health aide notified her agency at the time but nothing was done, and upon returning to work with the pt yesterday, noticed pt was still experiencing abdominal pain and decided to bring pt into the ED for evaluation. In ED, pt is pointing to the abdomen when asked what hurts. At Ed abdominal CT was done which shows Large amount of stool throughout the colon may indicate constipation.  Home health aide denies pt fever, chills, vomiting, diarrhea, or any other complaints. Extended Hx and HPI limited due to pt's condition.

## 2018-06-06 NOTE — ADVANCED PRACTICE NURSE CONSULT - ASSESSMENT
This is a 67yr old male patient admitted for Constipation, presenting with a Stage 1 Pressure Injury to the Bilateral Heels, as evident by non-blanchable erythema

## 2018-06-07 DIAGNOSIS — Z02.9 ENCOUNTER FOR ADMINISTRATIVE EXAMINATIONS, UNSPECIFIED: ICD-10-CM

## 2018-06-07 RX ORDER — SOD SULF/SODIUM/NAHCO3/KCL/PEG
4000 SOLUTION, RECONSTITUTED, ORAL ORAL ONCE
Qty: 0 | Refills: 0 | Status: COMPLETED | OUTPATIENT
Start: 2018-06-07 | End: 2018-06-07

## 2018-06-07 RX ORDER — SODIUM CHLORIDE 9 MG/ML
1000 INJECTION, SOLUTION INTRAVENOUS
Qty: 0 | Refills: 0 | Status: DISCONTINUED | OUTPATIENT
Start: 2018-06-07 | End: 2018-06-13

## 2018-06-07 RX ADMIN — ATORVASTATIN CALCIUM 40 MILLIGRAM(S): 80 TABLET, FILM COATED ORAL at 21:34

## 2018-06-07 RX ADMIN — CLOPIDOGREL BISULFATE 75 MILLIGRAM(S): 75 TABLET, FILM COATED ORAL at 11:51

## 2018-06-07 RX ADMIN — Medication 100 MILLIGRAM(S): at 06:16

## 2018-06-07 RX ADMIN — MEMANTINE HYDROCHLORIDE 5 MILLIGRAM(S): 10 TABLET ORAL at 21:34

## 2018-06-07 RX ADMIN — Medication 50 MILLIGRAM(S): at 13:03

## 2018-06-07 RX ADMIN — CARBIDOPA AND LEVODOPA 1 TABLET(S): 25; 100 TABLET ORAL at 17:57

## 2018-06-07 RX ADMIN — Medication 4000 MILLILITER(S): at 21:47

## 2018-06-07 RX ADMIN — Medication 100 MILLIGRAM(S): at 17:57

## 2018-06-07 RX ADMIN — CARBIDOPA AND LEVODOPA 1 TABLET(S): 25; 100 TABLET ORAL at 06:16

## 2018-06-07 RX ADMIN — Medication 81 MILLIGRAM(S): at 11:51

## 2018-06-07 RX ADMIN — PANTOPRAZOLE SODIUM 40 MILLIGRAM(S): 20 TABLET, DELAYED RELEASE ORAL at 06:16

## 2018-06-07 RX ADMIN — AMLODIPINE BESYLATE 5 MILLIGRAM(S): 2.5 TABLET ORAL at 06:16

## 2018-06-07 RX ADMIN — CARBIDOPA AND LEVODOPA 1 TABLET(S): 25; 100 TABLET ORAL at 13:03

## 2018-06-07 RX ADMIN — POLYETHYLENE GLYCOL 3350 17 GRAM(S): 17 POWDER, FOR SOLUTION ORAL at 11:51

## 2018-06-07 RX ADMIN — SENNA PLUS 2 TABLET(S): 8.6 TABLET ORAL at 21:34

## 2018-06-07 NOTE — PHYSICAL THERAPY INITIAL EVALUATION ADULT - GENERAL OBSERVATIONS, REHAB EVAL
Pt. found lying supine in NAD; pt. with right eye blindness, non-verbal but able to follow simple commands. Pt. able to answer "yes" or "no questions by squeezing my hand if the answer is yes. His answers were appropriate.

## 2018-06-07 NOTE — PHYSICAL THERAPY INITIAL EVALUATION ADULT - BALANCE DISTURBANCE, IDENTIFIED IMPAIRMENT CONTRIBUTE, REHAB EVAL
impaired coordination/impaired postural control/impaired motor control/abnormal muscle tone/decreased strength

## 2018-06-07 NOTE — PROGRESS NOTE ADULT - SUBJECTIVE AND OBJECTIVE BOX
[   ] ICU                                          [   ] CCU                                      [  X ] Medical Floor    Patient is comfortable. No new complaints reported, No abdominal pain, N/V, hematemesis, hematochezia, melena, fever, chills, chest pain, SOB, cough or diarrhea reported.    VITALS  Vital Signs Last 24 Hrs  T(C): 36.9 (07 Jun 2018 14:24), Max: 36.9 (07 Jun 2018 14:24)  T(F): 98.4 (07 Jun 2018 14:24), Max: 98.4 (07 Jun 2018 14:24)  HR: 86 (07 Jun 2018 14:24) (63 - 86)  BP: 128/63 (07 Jun 2018 14:24) (117/55 - 128/63)   RR: 18 (07 Jun 2018 14:24) (14 - 18)  SpO2: 99% (07 Jun 2018 14:24) (98% - 100%)       MEDICATIONS  (STANDING):  amLODIPine   Tablet 5 milliGRAM(s) Oral daily  aspirin  chewable 81 milliGRAM(s) Oral daily  atorvastatin 40 milliGRAM(s) Oral at bedtime  carbidopa/levodopa  25/100 1 Tablet(s) Oral <User Schedule>  clopidogrel Tablet 75 milliGRAM(s) Oral daily  dextrose 5% + sodium chloride 0.9%. 1000 milliLiter(s) (65 mL/Hr) IV Continuous <Continuous>  docusate sodium 100 milliGRAM(s) Oral two times a day  memantine 5 milliGRAM(s) Oral at bedtime  pantoprazole    Tablet 40 milliGRAM(s) Oral before breakfast  polyethylene glycol 3350 17 Gram(s) Oral daily  polyethylene glycol/electrolyte Solution. 4000 milliLiter(s) Oral once  senna 2 Tablet(s) Oral at bedtime  traZODone 50 milliGRAM(s) Oral daily    MEDICATIONS  (PRN):                            14.4   5.6   )-----------( 211      ( 06 Jun 2018 07:44 )             44.0       06-06    145  |  113<H>  |  11  ----------------------------<  86  4.4   |  29  |  0.85    Ca    8.8      06 Jun 2018 07:44  Phos  3.2     06-06  Mg     2.6     06-06

## 2018-06-07 NOTE — PROGRESS NOTE ADULT - SUBJECTIVE AND OBJECTIVE BOX
Patient is a 67y old  Male who presents with a chief complaint of constipation and abdominal pain (06 Jun 2018 00:36)    No Known Allergies      INTERVAL HPI /OVERNIGHT EVENTS: no acute overnight events. On encounter patient offers no new complaints. Patient non-verbal but able to communicate via squeezing hands; follows commands. Still endorsing abdominal pain on right side, but having BM's. Patine informed of plan of care; to have colonoscopy tomorrow and in agreement. Remains afebrile.     T(C): 36.2 (06-07-18 @ 05:18), Max: 36.8 (06-06-18 @ 13:49)  HR: 80 (06-07-18 @ 10:32) (63 - 80)  BP: 119/67 (06-07-18 @ 10:32) (117/55 - 139/80)  RR: 14 (06-07-18 @ 05:18) (14 - 18)  SpO2: 99% (06-07-18 @ 10:32) (97% - 100%)  I&O's Summary    Vital Signs Last 24 Hrs  T(C): 36.2 (07 Jun 2018 05:18), Max: 36.8 (06 Jun 2018 13:49)  T(F): 97.2 (07 Jun 2018 05:18), Max: 98.2 (06 Jun 2018 13:49)  HR: 80 (07 Jun 2018 10:32) (63 - 80)  BP: 119/67 (07 Jun 2018 10:32) (117/55 - 139/80)  BP(mean): 18 (06 Jun 2018 13:49) (18 - 18)  RR: 14 (07 Jun 2018 05:18) (14 - 18)  SpO2: 99% (07 Jun 2018 10:32) (97% - 100%)  PAST MEDICAL & SURGICAL HISTORY:  HLD (hyperlipidemia)  HTN (hypertension)  Parkinson's disease  Blindness of right eye  CVA (cerebral vascular accident)  History of laminectomy          LABS:                        14.4   5.6   )-----------( 211      ( 06 Jun 2018 07:44 )             44.0     06-06    145  |  113<H>  |  11  ----------------------------<  86  4.4   |  29  |  0.85    Ca    8.8      06 Jun 2018 07:44  Phos  3.2     06-06  Mg     2.6     06-06    TPro  7.3  /  Alb  3.9  /  TBili  0.4  /  DBili  x   /  AST  13  /  ALT  24  /  AlkPhos  102  06-05      CAPILLARY BLOOD GLUCOSE                MEDICATIONS  (STANDING):  amLODIPine   Tablet 5 milliGRAM(s) Oral daily  aspirin  chewable 81 milliGRAM(s) Oral daily  atorvastatin 40 milliGRAM(s) Oral at bedtime  carbidopa/levodopa  25/100 1 Tablet(s) Oral <User Schedule>  clopidogrel Tablet 75 milliGRAM(s) Oral daily  dextrose 5% + sodium chloride 0.9%. 1000 milliLiter(s) (65 mL/Hr) IV Continuous <Continuous>  docusate sodium 100 milliGRAM(s) Oral two times a day  memantine 5 milliGRAM(s) Oral at bedtime  pantoprazole    Tablet 40 milliGRAM(s) Oral before breakfast  polyethylene glycol 3350 17 Gram(s) Oral daily  polyethylene glycol/electrolyte Solution. 4000 milliLiter(s) Oral once  senna 2 Tablet(s) Oral at bedtime  traZODone 50 milliGRAM(s) Oral daily    MEDICATIONS  (PRN):      REVIEW OF SYSTEMS: limited due to patients ability to communicate  CONSTITUTIONAL: No fever.  EYES: No eye pain, or discharge  ENMT:  No difficulty hearing, No sinus or throat pain  NECK: No pain or stiffness  RESPIRATORY: No cough, wheezing, chills or hemoptysis; No shortness of breath  CARDIOVASCULAR: No chest pain, or leg swelling  GASTROINTESTINAL: +abdominal pain. No nausea, vomiting, or hematemesis; No diarrhea or constipation. No melena or hematochezia.  GENITOURINARY: No dysuria, frequency, hematuria, or incontinence  NEUROLOGICAL: No headaches, memory loss, loss of strength, numbness, or tremors  SKIN: No itching, burning, rashes, or lesions   LYMPH NODES: No enlarged glands  ENDOCRINE: No heat or cold intolerance; No hair loss  MUSCULOSKELETAL: No joint pain or swelling; No muscle, back, or extremity pain  PSYCHIATRIC: No depression, anxiety, mood swings, or difficulty sleeping  HEME/LYMPH: No easy bruising, or bleeding gums  ALLERGY AND IMMUNOLOGIC: No hives or eczema    RADIOLOGY & ADDITIONAL TESTS:      Consultant(s) Notes Reviewed:  [ ] YES  [ ] NO    PHYSICAL EXAM:  GENERAL: NAD, well-groomed, well-developed  HEAD:  Atraumatic, Normocephalic  EYES: EOMI, PERRLA, conjunctiva and sclera clear  ENMT: No tonsillar erythema, exudates, or enlargement; Moist mucous membranes, Good dentition, No lesions  NECK: Supple, No JVD, Normal thyroid  NERVOUS SYSTEM:  Alert & Oriented X3, Good concentration; Motor Strength 5/5 B/L upper and lower extremities; DTRs 2+ intact and symmetric  CHEST/LUNG: Good air movement bilaterally; No rales, rhonchi, wheezing, or rubs  HEART: S1, S2; No murmurs, rubs, or gallops  ABDOMEN: Soft, Nontender, Nondistended; Bowel sounds present  EXTREMITIES:  2+ Peripheral Pulses, No clubbing, cyanosis, or edema  LYMPH: No lymphadenopathy noted  SKIN: No rashes or lesions    Care Collaborated Discussed with Consultants/Other Providers [ ] YES  [ ] NO Patient is a 67y old  Male who presents with a chief complaint of constipation and abdominal pain (06 Jun 2018 00:36)    No Known Allergies      INTERVAL HPI /OVERNIGHT EVENTS: no acute overnight events. On encounter patient offers no new complaints. Patient non-verbal but able to communicate via squeezing hands; follows commands. Still endorsing abdominal pain on right side, but having BM's. Patine informed of plan of care; to have colonoscopy tomorrow and in agreement. Remains afebrile.     T(C): 36.2 (06-07-18 @ 05:18), Max: 36.8 (06-06-18 @ 13:49)  HR: 80 (06-07-18 @ 10:32) (63 - 80)  BP: 119/67 (06-07-18 @ 10:32) (117/55 - 139/80)  RR: 14 (06-07-18 @ 05:18) (14 - 18)  SpO2: 99% (06-07-18 @ 10:32) (97% - 100%)  I&O's Summary    Vital Signs Last 24 Hrs  T(C): 36.2 (07 Jun 2018 05:18), Max: 36.8 (06 Jun 2018 13:49)  T(F): 97.2 (07 Jun 2018 05:18), Max: 98.2 (06 Jun 2018 13:49)  HR: 80 (07 Jun 2018 10:32) (63 - 80)  BP: 119/67 (07 Jun 2018 10:32) (117/55 - 139/80)  BP(mean): 18 (06 Jun 2018 13:49) (18 - 18)  RR: 14 (07 Jun 2018 05:18) (14 - 18)  SpO2: 99% (07 Jun 2018 10:32) (97% - 100%)  PAST MEDICAL & SURGICAL HISTORY:  HLD (hyperlipidemia)  HTN (hypertension)  Parkinson's disease  Blindness of right eye  CVA (cerebral vascular accident)  History of laminectomy          LABS:                        14.4   5.6   )-----------( 211      ( 06 Jun 2018 07:44 )             44.0     06-06    145  |  113<H>  |  11  ----------------------------<  86  4.4   |  29  |  0.85    Ca    8.8      06 Jun 2018 07:44  Phos  3.2     06-06  Mg     2.6     06-06    TPro  7.3  /  Alb  3.9  /  TBili  0.4  /  DBili  x   /  AST  13  /  ALT  24  /  AlkPhos  102  06-05      CAPILLARY BLOOD GLUCOSE                MEDICATIONS  (STANDING):  amLODIPine   Tablet 5 milliGRAM(s) Oral daily  aspirin  chewable 81 milliGRAM(s) Oral daily  atorvastatin 40 milliGRAM(s) Oral at bedtime  carbidopa/levodopa  25/100 1 Tablet(s) Oral <User Schedule>  clopidogrel Tablet 75 milliGRAM(s) Oral daily  dextrose 5% + sodium chloride 0.9%. 1000 milliLiter(s) (65 mL/Hr) IV Continuous <Continuous>  docusate sodium 100 milliGRAM(s) Oral two times a day  memantine 5 milliGRAM(s) Oral at bedtime  pantoprazole    Tablet 40 milliGRAM(s) Oral before breakfast  polyethylene glycol 3350 17 Gram(s) Oral daily  polyethylene glycol/electrolyte Solution. 4000 milliLiter(s) Oral once  senna 2 Tablet(s) Oral at bedtime  traZODone 50 milliGRAM(s) Oral daily    MEDICATIONS  (PRN):      REVIEW OF SYSTEMS: limited due to patients ability to communicate  CONSTITUTIONAL: No fever.  EYES: No eye pain, or discharge  ENMT:  No difficulty hearing, No sinus or throat pain  NECK: No pain or stiffness  RESPIRATORY: No cough, wheezing, chills or hemoptysis; No shortness of breath  CARDIOVASCULAR: No chest pain, or leg swelling  GASTROINTESTINAL: +abdominal pain and constipation. No vomiting, or hematemesis.  NEUROLOGICAL: No headaches.  MUSCULOSKELETAL: No joint pain. No muscle, back, or extremity pain      RADIOLOGY & ADDITIONAL TESTS:  < from: CT Abdomen and Pelvis w/ IV Cont (06.05.18 @ 18:37) >  IMPRESSION: No acute pathology. Cholelithiasis  Large amount of stool throughout the colon may indicate constipation  Mild superior compression fracture of L1    < from: 12 Lead ECG (01.10.18 @ 13:44) >  Diagnosis Line *** Poor data quality, interpretation may be adversely affected  Sinus tachycardia  Otherwise normal ECG  Confirmed by JULEE HENRANDEZ, DENNYS ([id number]) on 15-Yahir-2018 13:10:01          Consultant(s) Notes Reviewed:  [x] YES  [ ] NO    PHYSICAL EXAM:  GENERAL: NAD, male awake in bed working with physical therapy  HEAD:  Atraumatic, Normocephalic  EYES :conjunctiva and sclera clear  NECK: Supple, No JVD, Normal thyroid  NERVOUS SYSTEM: Patient able to answer simple questions by squeezing hands. Able to follow commands and moves all extremities. Good concentration; Motor Strength 5/5 B/L upper extremities and 3/5 bilateral lower extremities  CHEST/LUNG: Good air movement bilaterally.  HEART: S1, S2  ABDOMEN: +Right sided abdominal pain. Soft, Nontender, Nondistended; Bowel sounds present  EXTREMITIES:  2+ Peripheral Pulses, No clubbing, cyanosis, or edema      Care Collaborated Discussed with Consultants/Other Providers [x] YES  [ ] NO

## 2018-06-07 NOTE — PHYSICAL THERAPY INITIAL EVALUATION ADULT - IMPAIRED TRANSFERS: SIT/STAND, REHAB EVAL
impaired balance/impaired coordination/decreased strength/decreased flexibility/abnormal muscle tone

## 2018-06-07 NOTE — PHYSICAL THERAPY INITIAL EVALUATION ADULT - IMPAIRMENTS CONTRIBUTING IMPAIRED BED MOBILITY, REHAB EVAL
impaired balance/impaired coordination/decreased flexibility/abnormal muscle tone/impaired postural control/impaired motor control/decreased strength

## 2018-06-07 NOTE — PHYSICAL THERAPY INITIAL EVALUATION ADULT - LIVES WITH, PROFILE
Pt. lives in an apartment with elevator access; no stairs to enter the building. Pt. has [facility] ( 2 12-hour shifts)

## 2018-06-07 NOTE — PROGRESS NOTE ADULT - PROBLEM SELECTOR PLAN 1
CT Abdomen and Pelvis w/ IV Cont showed Large amount of stool throughout the colon may indicate constipation.  s/p fleet enema with large BM. continue Miralax, senna and colace  GI Sunil consulted -- patient placed on clear liquids, Golytely ordered for possible colonoscopy tomorrow 6/8/18 CT Abdomen and Pelvis w/ IV Cont showed Large amount of stool throughout the colon may indicate constipation.  s/p fleet enema with large BM. continue Miralax, senna and colace  GI Sunil consulted -- CEA ordered. patient placed on clear liquids, Golytely ordered for possible colonoscopy tomorrow 6/8/18

## 2018-06-07 NOTE — PHYSICAL THERAPY INITIAL EVALUATION ADULT - CRITERIA FOR SKILLED THERAPEUTIC INTERVENTIONS
risk reduction/prevention/therapy frequency/anticipated discharge recommendation/impairments found/functional limitations in following categories/predicted duration of therapy intervention/rehab potential

## 2018-06-08 DIAGNOSIS — E86.0 DEHYDRATION: ICD-10-CM

## 2018-06-08 LAB
CEA SERPL-MCNC: 3 NG/ML — SIGNIFICANT CHANGE UP (ref 0–3.8)
HCT VFR BLD CALC: 42.4 % — SIGNIFICANT CHANGE UP (ref 39–50)
HGB BLD-MCNC: 13.7 G/DL — SIGNIFICANT CHANGE UP (ref 13–17)
MCHC RBC-ENTMCNC: 30.2 PG — SIGNIFICANT CHANGE UP (ref 27–34)
MCHC RBC-ENTMCNC: 32.4 GM/DL — SIGNIFICANT CHANGE UP (ref 32–36)
MCV RBC AUTO: 93.2 FL — SIGNIFICANT CHANGE UP (ref 80–100)
PLATELET # BLD AUTO: 195 K/UL — SIGNIFICANT CHANGE UP (ref 150–400)
RBC # BLD: 4.55 M/UL — SIGNIFICANT CHANGE UP (ref 4.2–5.8)
RBC # FLD: 13.3 % — SIGNIFICANT CHANGE UP (ref 10.3–14.5)
WBC # BLD: 5.8 K/UL — SIGNIFICANT CHANGE UP (ref 3.8–10.5)
WBC # FLD AUTO: 5.8 K/UL — SIGNIFICANT CHANGE UP (ref 3.8–10.5)

## 2018-06-08 RX ADMIN — POLYETHYLENE GLYCOL 3350 17 GRAM(S): 17 POWDER, FOR SOLUTION ORAL at 11:45

## 2018-06-08 RX ADMIN — ATORVASTATIN CALCIUM 40 MILLIGRAM(S): 80 TABLET, FILM COATED ORAL at 21:26

## 2018-06-08 RX ADMIN — Medication 100 MILLIGRAM(S): at 06:08

## 2018-06-08 RX ADMIN — PANTOPRAZOLE SODIUM 40 MILLIGRAM(S): 20 TABLET, DELAYED RELEASE ORAL at 06:08

## 2018-06-08 RX ADMIN — AMLODIPINE BESYLATE 5 MILLIGRAM(S): 2.5 TABLET ORAL at 06:08

## 2018-06-08 RX ADMIN — CARBIDOPA AND LEVODOPA 1 TABLET(S): 25; 100 TABLET ORAL at 06:08

## 2018-06-08 RX ADMIN — SODIUM CHLORIDE 65 MILLILITER(S): 9 INJECTION, SOLUTION INTRAVENOUS at 09:24

## 2018-06-08 RX ADMIN — Medication 100 MILLIGRAM(S): at 18:01

## 2018-06-08 RX ADMIN — SENNA PLUS 2 TABLET(S): 8.6 TABLET ORAL at 21:26

## 2018-06-08 RX ADMIN — CARBIDOPA AND LEVODOPA 1 TABLET(S): 25; 100 TABLET ORAL at 18:01

## 2018-06-08 RX ADMIN — CLOPIDOGREL BISULFATE 75 MILLIGRAM(S): 75 TABLET, FILM COATED ORAL at 11:46

## 2018-06-08 RX ADMIN — SODIUM CHLORIDE 65 MILLILITER(S): 9 INJECTION, SOLUTION INTRAVENOUS at 00:00

## 2018-06-08 RX ADMIN — MEMANTINE HYDROCHLORIDE 5 MILLIGRAM(S): 10 TABLET ORAL at 21:26

## 2018-06-08 RX ADMIN — CARBIDOPA AND LEVODOPA 1 TABLET(S): 25; 100 TABLET ORAL at 13:17

## 2018-06-08 RX ADMIN — Medication 50 MILLIGRAM(S): at 11:45

## 2018-06-08 NOTE — PROGRESS NOTE ADULT - PROBLEM SELECTOR PLAN 1
CT Abdomen and Pelvis w/ IV Cont showed Large amount of stool throughout the colon may indicate constipation.  s/p fleet enema with large BM. continue Miralax, senna and colace  GI Sunil consulted -- CEA ordered and was within normal limits. Patient started on clear liquids and NPO after MN for colonoscopy.  Pt at colonoscopy

## 2018-06-08 NOTE — PROGRESS NOTE ADULT - SUBJECTIVE AND OBJECTIVE BOX
NP Note discussed with  Primary Attending    Patient is a 67y old  Male who presents with a chief complaint of constipation and abdominal pain (06 Jun 2018 00:36).  Pt appears comfortable on bowel regimen.  Pt nonverbal      INTERVAL HPI/OVERNIGHT EVENTS: no new complaints    MEDICATIONS  (STANDING):  amLODIPine   Tablet 5 milliGRAM(s) Oral daily  aspirin  chewable 81 milliGRAM(s) Oral daily  atorvastatin 40 milliGRAM(s) Oral at bedtime  carbidopa/levodopa  25/100 1 Tablet(s) Oral <User Schedule>  clopidogrel Tablet 75 milliGRAM(s) Oral daily  dextrose 5% + sodium chloride 0.9%. 1000 milliLiter(s) (65 mL/Hr) IV Continuous <Continuous>  docusate sodium 100 milliGRAM(s) Oral two times a day  memantine 5 milliGRAM(s) Oral at bedtime  pantoprazole    Tablet 40 milliGRAM(s) Oral before breakfast  polyethylene glycol 3350 17 Gram(s) Oral daily  senna 2 Tablet(s) Oral at bedtime  traZODone 50 milliGRAM(s) Oral daily    MEDICATIONS  (PRN):      __________________________________________________  REVIEW OF SYSTEMS:  Pt nonverbal    Vital Signs Last 24 Hrs  T(C): 36.3 (08 Jun 2018 13:01), Max: 36.7 (08 Jun 2018 05:27)  T(F): 97.4 (08 Jun 2018 13:01), Max: 98.1 (08 Jun 2018 05:27)  HR: 82 (08 Jun 2018 13:01) (67 - 82)  BP: 133/76 (08 Jun 2018 13:01) (121/69 - 134/65)  BP(mean): --  RR: 16 (08 Jun 2018 13:01) (15 - 16)  SpO2: 96% (08 Jun 2018 13:01) (96% - 100%)    ________________________________________________  PHYSICAL EXAM:  GENERAL: NAD  HEENT: Normocephalic;  conjunctivae and sclerae clear; moist mucous membranes;   NECK : supple  CHEST/LUNG: Clear to auscultation bilaterally with good air entry   HEART: S1 S2  regular; no murmurs, gallops or rubs  ABDOMEN: Soft, Nontender, Nondistended; Bowel sounds present  EXTREMITIES: no cyanosis; no edema; no calf tenderness    _________________________________________________  LABS:                        13.7   5.8   )-----------( 195      ( 08 Jun 2018 06:23 )             42.4               CAPILLARY BLOOD GLUCOSE            RADIOLOGY & ADDITIONAL TESTS:    Imaging Personally Reviewed:  YES    Consultant(s) Notes Reviewed:   YES    Care Discussed with Consultants :     Plan of care was discussed with patient and /or primary care giver; all questions and concerns were addressed and care was aligned with patient's wishes.

## 2018-06-08 NOTE — PROGRESS NOTE ADULT - PROBLEM SELECTOR PLAN 6
Patient with HHA  NP spoke with emergency contact:  Britt Chadwick today  regarding setting up HHA post colonoscopy/if tolerate diet,  and she stated CANNOT START HHA's over the weekend.  She will  resume HHA on Monday for discharge.

## 2018-06-08 NOTE — PROGRESS NOTE ADULT - SUBJECTIVE AND OBJECTIVE BOX
Patient was seen and examined  Patient is a 67y old  Male who presents with a chief complaint of constipation and abdominal pain (06 Jun 2018 00:36)      INTERVAL HPI/OVERNIGHT EVENTS:  T(C): 36.7 (06-08-18 @ 05:27), Max: 36.9 (06-07-18 @ 14:24)  HR: 75 (06-08-18 @ 05:27) (67 - 86)  BP: 134/65 (06-08-18 @ 05:27) (119/67 - 134/65)  RR: 15 (06-08-18 @ 05:27) (15 - 18)  SpO2: 100% (06-08-18 @ 05:27) (98% - 100%)  Wt(kg): --  I&O's Summary      LABS:                        13.7   5.8   )-----------( 195      ( 08 Jun 2018 06:23 )             42.4               CAPILLARY BLOOD GLUCOSE        LIPID PANEL  Cholesterol 79  LDL 20  HDL 44  RATIO HDL/Total Cholesterol --  Triglyceride 73            MEDICATIONS  (STANDING):  amLODIPine   Tablet 5 milliGRAM(s) Oral daily  aspirin  chewable 81 milliGRAM(s) Oral daily  atorvastatin 40 milliGRAM(s) Oral at bedtime  carbidopa/levodopa  25/100 1 Tablet(s) Oral <User Schedule>  clopidogrel Tablet 75 milliGRAM(s) Oral daily  dextrose 5% + sodium chloride 0.9%. 1000 milliLiter(s) (65 mL/Hr) IV Continuous <Continuous>  docusate sodium 100 milliGRAM(s) Oral two times a day  memantine 5 milliGRAM(s) Oral at bedtime  pantoprazole    Tablet 40 milliGRAM(s) Oral before breakfast  polyethylene glycol 3350 17 Gram(s) Oral daily  senna 2 Tablet(s) Oral at bedtime  traZODone 50 milliGRAM(s) Oral daily    MEDICATIONS  (PRN):      RADIOLOGY & ADDITIONAL TESTS:    Imaging Personally Reviewed:  [ ] YES  [ ] NO    REVIEW OF SYSTEMS:  nad       Consultant(s) Notes Reviewed:  [ x ] YES  [ ] NO    PHYSICAL EXAM:  GENERAL: NAD, well-groomed, well-developed  HEAD:  Atraumatic, Normocephalic  EYES: r eye blind   ENMT: No tonsillar erythema, exudates, or enlargement; Moist mucous membranes, Good dentition, No lesions  NECK: Supple, No JVD, Normal thyroid  NERVOUS SYSTEM: awake rigidity  CHEST/LUNG: Clear to percussion bilaterally; No rales, rhonchi, wheezing, or rubs  HEART: Regular rate and rhythm; No murmurs, rubs, or gallops  ABDOMEN: Soft, Nontender, Nondistended; Bowel sounds present  EXTREMITIES:  2+ Peripheral Pulses, No clubbing, cyanosis, or edema  LYMPH: No lymphadenopathy noted  SKIN: No rashes or lesions    Care Discussed with Consultants/Other Providers [ x] YES  [ ] NO

## 2018-06-08 NOTE — PROGRESS NOTE ADULT - PROBLEM SELECTOR PLAN 1
CT Abdomen and Pelvis w/ IV Cont showed Large amount of stool throughout the colon may indicate constipation.  s/p fleet enema with large BM. continue Miralax, senna and colace  GI Sunil consulted -- CEA ordered. patient placed on clear liquids, Golytely ordered for possible colonoscopy tomorrow 6/8/18

## 2018-06-09 RX ADMIN — Medication 81 MILLIGRAM(S): at 12:05

## 2018-06-09 RX ADMIN — Medication 50 MILLIGRAM(S): at 12:05

## 2018-06-09 RX ADMIN — AMLODIPINE BESYLATE 5 MILLIGRAM(S): 2.5 TABLET ORAL at 05:26

## 2018-06-09 RX ADMIN — Medication 100 MILLIGRAM(S): at 05:24

## 2018-06-09 RX ADMIN — CARBIDOPA AND LEVODOPA 1 TABLET(S): 25; 100 TABLET ORAL at 05:24

## 2018-06-09 RX ADMIN — MEMANTINE HYDROCHLORIDE 5 MILLIGRAM(S): 10 TABLET ORAL at 21:47

## 2018-06-09 RX ADMIN — CARBIDOPA AND LEVODOPA 1 TABLET(S): 25; 100 TABLET ORAL at 14:10

## 2018-06-09 RX ADMIN — Medication 100 MILLIGRAM(S): at 17:29

## 2018-06-09 RX ADMIN — PANTOPRAZOLE SODIUM 40 MILLIGRAM(S): 20 TABLET, DELAYED RELEASE ORAL at 05:24

## 2018-06-09 RX ADMIN — ATORVASTATIN CALCIUM 40 MILLIGRAM(S): 80 TABLET, FILM COATED ORAL at 21:47

## 2018-06-09 RX ADMIN — CARBIDOPA AND LEVODOPA 1 TABLET(S): 25; 100 TABLET ORAL at 17:29

## 2018-06-09 RX ADMIN — SENNA PLUS 2 TABLET(S): 8.6 TABLET ORAL at 21:46

## 2018-06-09 RX ADMIN — CLOPIDOGREL BISULFATE 75 MILLIGRAM(S): 75 TABLET, FILM COATED ORAL at 12:05

## 2018-06-09 RX ADMIN — POLYETHYLENE GLYCOL 3350 17 GRAM(S): 17 POWDER, FOR SOLUTION ORAL at 12:05

## 2018-06-09 NOTE — PROGRESS NOTE ADULT - SUBJECTIVE AND OBJECTIVE BOX
Patient was seen and examined  Patient is a 67y old  Male who presents with a chief complaint of constipation and abdominal pain (06 Jun 2018 00:36)      INTERVAL HPI/OVERNIGHT EVENTS:  T(C): 36.4 (06-09-18 @ 05:36), Max: 36.7 (06-08-18 @ 16:53)  HR: 61 (06-09-18 @ 05:36) (61 - 82)  BP: 115/63 (06-09-18 @ 05:36) (104/59 - 133/76)  RR: 14 (06-09-18 @ 05:36) (14 - 16)  SpO2: 99% (06-09-18 @ 05:36) (96% - 99%)  Wt(kg): --  I&O's Summary      LABS:                        13.7   5.8   )-----------( 195      ( 08 Jun 2018 06:23 )             42.4               CAPILLARY BLOOD GLUCOSE                  MEDICATIONS  (STANDING):  amLODIPine   Tablet 5 milliGRAM(s) Oral daily  aspirin  chewable 81 milliGRAM(s) Oral daily  atorvastatin 40 milliGRAM(s) Oral at bedtime  carbidopa/levodopa  25/100 1 Tablet(s) Oral <User Schedule>  clopidogrel Tablet 75 milliGRAM(s) Oral daily  dextrose 5% + sodium chloride 0.9%. 1000 milliLiter(s) (65 mL/Hr) IV Continuous <Continuous>  docusate sodium 100 milliGRAM(s) Oral two times a day  memantine 5 milliGRAM(s) Oral at bedtime  pantoprazole    Tablet 40 milliGRAM(s) Oral before breakfast  polyethylene glycol 3350 17 Gram(s) Oral daily  senna 2 Tablet(s) Oral at bedtime  traZODone 50 milliGRAM(s) Oral daily    MEDICATIONS  (PRN):      RADIOLOGY & ADDITIONAL TESTS:    Imaging Personally Reviewed:  [ ] YES  [ ] NO    REVIEW OF SYSTEMS:  non verbal       Consultant(s) Notes Reviewed:  [ ] YES  [ ] NO    PHYSICAL EXAM:  GENERAL: NAD, well-groomed, well-developed  HEAD:  Atraumatic, Normocephalic  EYES: EOMI, PERRLA, conjunctiva and sclera clear  ENMT: No tonsillar erythema, exudates, or enlargement; Moist mucous membranes, Good dentition, No lesions  NECK: Supple, No JVD, Normal thyroid  NERVOUS SYSTEM:  non verbal   CHEST/LUNG: Clear to percussion bilaterally; No rales, rhonchi, wheezing, or rubs  HEART: Regular rate and rhythm; No murmurs, rubs, or gallops  ABDOMEN: Soft, Nontender, Nondistended; Bowel sounds present  EXTREMITIES:  2+ Peripheral Pulses, No clubbing, cyanosis, or edema  LYMPH: No lymphadenopathy noted  SKIN: No rashes or lesions    Care Discussed with Consultants/Other Providers [ x] YES  [ ] NO

## 2018-06-10 PROCEDURE — 71045 X-RAY EXAM CHEST 1 VIEW: CPT | Mod: 26

## 2018-06-10 RX ADMIN — Medication 100 MILLIGRAM(S): at 05:53

## 2018-06-10 RX ADMIN — ATORVASTATIN CALCIUM 40 MILLIGRAM(S): 80 TABLET, FILM COATED ORAL at 21:57

## 2018-06-10 RX ADMIN — SENNA PLUS 2 TABLET(S): 8.6 TABLET ORAL at 21:58

## 2018-06-10 RX ADMIN — CARBIDOPA AND LEVODOPA 1 TABLET(S): 25; 100 TABLET ORAL at 17:32

## 2018-06-10 RX ADMIN — Medication 100 MILLIGRAM(S): at 17:32

## 2018-06-10 RX ADMIN — CARBIDOPA AND LEVODOPA 1 TABLET(S): 25; 100 TABLET ORAL at 05:55

## 2018-06-10 RX ADMIN — Medication 100 MILLIGRAM(S): at 21:58

## 2018-06-10 RX ADMIN — MEMANTINE HYDROCHLORIDE 5 MILLIGRAM(S): 10 TABLET ORAL at 21:58

## 2018-06-10 RX ADMIN — Medication 81 MILLIGRAM(S): at 11:40

## 2018-06-10 RX ADMIN — POLYETHYLENE GLYCOL 3350 17 GRAM(S): 17 POWDER, FOR SOLUTION ORAL at 11:40

## 2018-06-10 RX ADMIN — PANTOPRAZOLE SODIUM 40 MILLIGRAM(S): 20 TABLET, DELAYED RELEASE ORAL at 05:55

## 2018-06-10 RX ADMIN — Medication 50 MILLIGRAM(S): at 11:40

## 2018-06-10 RX ADMIN — CLOPIDOGREL BISULFATE 75 MILLIGRAM(S): 75 TABLET, FILM COATED ORAL at 11:40

## 2018-06-10 RX ADMIN — CARBIDOPA AND LEVODOPA 1 TABLET(S): 25; 100 TABLET ORAL at 13:25

## 2018-06-10 RX ADMIN — AMLODIPINE BESYLATE 5 MILLIGRAM(S): 2.5 TABLET ORAL at 05:53

## 2018-06-10 NOTE — CHART NOTE - NSCHARTNOTEFT_GEN_A_CORE
Paged by RN for pt had cough after having regular diet, concerned for possibly aspiration.  Vitals stable. Started pureed diet and Robitussin PRN for cough.  Primary care test please f/u CXR for possibly  aspiration and follow up with speech and swallow.    Vital Signs Last 24 Hrs  T(C): 36.7 (10 Jonatan 2018 17:25), Max: 37 (10 Jonatan 2018 05:28)  T(F): 98.1 (10 Jonatan 2018 17:25), Max: 98.6 (10 Jonatan 2018 05:28)  HR: 74 (10 Jonatan 2018 17:25) (74 - 97)  BP: 130/71 (10 Jonatan 2018 17:25) (110/51 - 130/71)  BP(mean): --  RR: 18 (10 Jonatan 2018 13:03) (16 - 18)  SpO2: 98% (10 Jonatan 2018 17:25) (98% - 100%) Paged by RN for pt had cough after having regular diet, concerned for possibly aspiration.  Vitals stable. Physical exam shows mild rales on Rt low lobe.   Started pureed diet and Robitussin PRN for cough.  Primary care test please f/u CXR for possibly  aspiration and follow up with speech and swallow.    Vital Signs Last 24 Hrs  T(C): 36.7 (10 Jonatan 2018 17:25), Max: 37 (10 Jonatan 2018 05:28)  T(F): 98.1 (10 Jonatan 2018 17:25), Max: 98.6 (10 Jonatan 2018 05:28)  HR: 74 (10 Jonatan 2018 17:25) (74 - 97)  BP: 130/71 (10 Jonatan 2018 17:25) (110/51 - 130/71)  BP(mean): --  RR: 18 (10 Jonatan 2018 13:03) (16 - 18)  SpO2: 98% (10 Jonatan 2018 17:25) (98% - 100%)

## 2018-06-10 NOTE — PROGRESS NOTE ADULT - SUBJECTIVE AND OBJECTIVE BOX
Patient was seen and examined  Patient is a 67y old  Male who presents with a chief complaint of constipation and abdominal pain (06 Jun 2018 00:36)      INTERVAL HPI/OVERNIGHT EVENTS:  T(C): 36.7 (06-10-18 @ 13:03), Max: 37 (06-10-18 @ 05:28)  HR: 97 (06-10-18 @ 13:03) (76 - 97)  BP: 127/79 (06-10-18 @ 13:03) (110/51 - 127/79)  RR: 18 (06-10-18 @ 13:03) (16 - 18)  SpO2: 99% (06-10-18 @ 13:03) (98% - 100%)    MEDICATIONS  (STANDING):  amLODIPine   Tablet 5 milliGRAM(s) Oral daily  aspirin  chewable 81 milliGRAM(s) Oral daily  atorvastatin 40 milliGRAM(s) Oral at bedtime  carbidopa/levodopa  25/100 1 Tablet(s) Oral <User Schedule>  clopidogrel Tablet 75 milliGRAM(s) Oral daily  dextrose 5% + sodium chloride 0.9%. 1000 milliLiter(s) (65 mL/Hr) IV Continuous <Continuous>  docusate sodium 100 milliGRAM(s) Oral two times a day  memantine 5 milliGRAM(s) Oral at bedtime  pantoprazole    Tablet 40 milliGRAM(s) Oral before breakfast  polyethylene glycol 3350 17 Gram(s) Oral daily  senna 2 Tablet(s) Oral at bedtime  traZODone 50 milliGRAM(s) Oral daily    MEDICATIONS  (PRN):      RADIOLOGY & ADDITIONAL TESTS:    Imaging Personally Reviewed:  [ ] YES  [ ] NO    REVIEW OF SYSTEMS:  CONSTITUTIONAL: No fever, weight loss, or fatigue  EYES: No eye pain, visual disturbances, or discharge  ENMT:  No difficulty hearing, tinnitus, vertigo; No sinus or throat pain  NECK: No pain or stiffness  BREASTS: No pain, masses, or nipple discharge  RESPIRATORY: No cough, wheezing, chills or hemoptysis; No shortness of breath  CARDIOVASCULAR: No chest pain, palpitations, dizziness, or leg swelling  GASTROINTESTINAL: No abdominal or epigastric pain. No nausea, vomiting, or hematemesis; No diarrhea or constipation. No melena or hematochezia.  GENITOURINARY: No dysuria, frequency, hematuria, or incontinence  NEUROLOGICAL: No headaches, memory loss, loss of strength, numbness, or tremors  SKIN: No itching, burning, rashes, or lesions   LYMPH NODES: No enlarged glands  ENDOCRINE: No heat or cold intolerance; No hair loss  MUSCULOSKELETAL: No joint pain or swelling; No muscle, back, or extremity pain  PSYCHIATRIC: No depression, anxiety, mood swings, or difficulty sleeping  HEME/LYMPH: No easy bruising, or bleeding gums  ALLERY AND IMMUNOLOGIC: No hives or eczema      Consultant(s) Notes Reviewed:  [ x ] YES  [ ] NO    PHYSICAL EXAM:  GENERAL: NAD, well-groomed, well-developed  HEAD:  Atraumatic, Normocephalic  EYES: EOMI, PERRLA, conjunctiva and sclera clear  ENMT: No tonsillar erythema, exudates, or enlargement; Moist mucous membranes, Good dentition, No lesions  NECK: Supple, No JVD, Normal thyroid  NERVOUS SYSTEM:  Alert & Oriented X3, Good concentration; Motor Strength 5/5 B/L upper and lower extremities; DTRs 2+ intact and symmetric  CHEST/LUNG: Clear to percussion bilaterally; No rales, rhonchi, wheezing, or rubs  HEART: Regular rate and rhythm; No murmurs, rubs, or gallops  ABDOMEN: Soft, Nontender, Nondistended; Bowel sounds present  EXTREMITIES:  2+ Peripheral Pulses, No clubbing, cyanosis, or edema  LYMPH: No lymphadenopathy noted  SKIN: No rashes or lesions    Care Discussed with Consultants/Other Providers [ x] YES  [ ] NO

## 2018-06-10 NOTE — PROGRESS NOTE ADULT - RS GEN PE MLT RESP DETAILS PC
airway patent/good air movement/breath sounds equal
breath sounds equal/good air movement/airway patent

## 2018-06-10 NOTE — PROGRESS NOTE ADULT - SUBJECTIVE AND OBJECTIVE BOX
[   ] ICU                                          [   ] CCU                                      [ X  ] Medical Floor    Patient is comfortable. No new complaints reported, No abdominal pain, N/V, hematemesis, hematochezia, melena, fever, chills, chest pain, SOB, cough or diarrhea reported.    VITALS  Vital Signs Last 24 Hrs  T(C): 36.7 (10 Jonatan 2018 13:03), Max: 37 (10 Jonatan 2018 05:28)  T(F): 98 (10 Jonatan 2018 13:03), Max: 98.6 (10 Jonatan 2018 05:28)  HR: 97 (10 Jonatan 2018 13:03) (76 - 97)  BP: 127/79 (10 Jonatan 2018 13:03) (110/51 - 127/79)   RR: 18 (10 Jonatan 2018 13:03) (14 - 18)  SpO2: 99% (10 Jonatan 2018 13:03) (98% - 100%)         MEDICATIONS  (STANDING):  amLODIPine   Tablet 5 milliGRAM(s) Oral daily  aspirin  chewable 81 milliGRAM(s) Oral daily  atorvastatin 40 milliGRAM(s) Oral at bedtime  carbidopa/levodopa  25/100 1 Tablet(s) Oral <User Schedule>  clopidogrel Tablet 75 milliGRAM(s) Oral daily  dextrose 5% + sodium chloride 0.9%. 1000 milliLiter(s) (65 mL/Hr) IV Continuous <Continuous>  docusate sodium 100 milliGRAM(s) Oral two times a day  memantine 5 milliGRAM(s) Oral at bedtime  pantoprazole    Tablet 40 milliGRAM(s) Oral before breakfast  polyethylene glycol 3350 17 Gram(s) Oral daily  senna 2 Tablet(s) Oral at bedtime  traZODone 50 milliGRAM(s) Oral daily

## 2018-06-10 NOTE — PROGRESS NOTE ADULT - GASTROINTESTINAL DETAILS
nontender/no distention/soft/no guarding/no rebound tenderness/no rigidity/bowel sounds normal
bowel sounds normal/no rebound tenderness/no rigidity/no guarding/no distention/nontender/soft

## 2018-06-11 LAB
ANION GAP SERPL CALC-SCNC: 7 MMOL/L — SIGNIFICANT CHANGE UP (ref 5–17)
BUN SERPL-MCNC: 10 MG/DL — SIGNIFICANT CHANGE UP (ref 7–18)
CALCIUM SERPL-MCNC: 8.8 MG/DL — SIGNIFICANT CHANGE UP (ref 8.4–10.5)
CHLORIDE SERPL-SCNC: 109 MMOL/L — HIGH (ref 96–108)
CO2 SERPL-SCNC: 26 MMOL/L — SIGNIFICANT CHANGE UP (ref 22–31)
CREAT SERPL-MCNC: 0.75 MG/DL — SIGNIFICANT CHANGE UP (ref 0.5–1.3)
GLUCOSE SERPL-MCNC: 84 MG/DL — SIGNIFICANT CHANGE UP (ref 70–99)
HCT VFR BLD CALC: 43.2 % — SIGNIFICANT CHANGE UP (ref 39–50)
HGB BLD-MCNC: 14 G/DL — SIGNIFICANT CHANGE UP (ref 13–17)
MCHC RBC-ENTMCNC: 29.9 PG — SIGNIFICANT CHANGE UP (ref 27–34)
MCHC RBC-ENTMCNC: 32.3 GM/DL — SIGNIFICANT CHANGE UP (ref 32–36)
MCV RBC AUTO: 92.5 FL — SIGNIFICANT CHANGE UP (ref 80–100)
PLATELET # BLD AUTO: 205 K/UL — SIGNIFICANT CHANGE UP (ref 150–400)
POTASSIUM SERPL-MCNC: 3.9 MMOL/L — SIGNIFICANT CHANGE UP (ref 3.5–5.3)
POTASSIUM SERPL-SCNC: 3.9 MMOL/L — SIGNIFICANT CHANGE UP (ref 3.5–5.3)
RBC # BLD: 4.67 M/UL — SIGNIFICANT CHANGE UP (ref 4.2–5.8)
RBC # FLD: 13.1 % — SIGNIFICANT CHANGE UP (ref 10.3–14.5)
SODIUM SERPL-SCNC: 142 MMOL/L — SIGNIFICANT CHANGE UP (ref 135–145)
WBC # BLD: 5.6 K/UL — SIGNIFICANT CHANGE UP (ref 3.8–10.5)
WBC # FLD AUTO: 5.6 K/UL — SIGNIFICANT CHANGE UP (ref 3.8–10.5)

## 2018-06-11 PROCEDURE — 74018 RADEX ABDOMEN 1 VIEW: CPT | Mod: 26

## 2018-06-11 RX ORDER — PANTOPRAZOLE SODIUM 20 MG/1
1 TABLET, DELAYED RELEASE ORAL
Qty: 30 | Refills: 0 | OUTPATIENT
Start: 2018-06-11 | End: 2018-07-10

## 2018-06-11 RX ORDER — MEMANTINE HYDROCHLORIDE 10 MG/1
1 TABLET ORAL
Qty: 30 | Refills: 0 | OUTPATIENT
Start: 2018-06-11 | End: 2018-07-10

## 2018-06-11 RX ORDER — POLYETHYLENE GLYCOL 3350 17 G/17G
17 POWDER, FOR SOLUTION ORAL
Qty: 17 | Refills: 0 | OUTPATIENT
Start: 2018-06-11 | End: 2018-07-10

## 2018-06-11 RX ORDER — DOCUSATE SODIUM 100 MG
1 CAPSULE ORAL
Qty: 60 | Refills: 0 | OUTPATIENT
Start: 2018-06-11 | End: 2018-07-10

## 2018-06-11 RX ORDER — SENNA PLUS 8.6 MG/1
2 TABLET ORAL
Qty: 60 | Refills: 0 | OUTPATIENT
Start: 2018-06-11 | End: 2018-07-10

## 2018-06-11 RX ORDER — CARBIDOPA AND LEVODOPA 25; 100 MG/1; MG/1
1 TABLET ORAL
Qty: 90 | Refills: 0 | OUTPATIENT
Start: 2018-06-11 | End: 2018-07-10

## 2018-06-11 RX ORDER — ERGOCALCIFEROL 1.25 MG/1
1 CAPSULE ORAL
Qty: 4 | Refills: 0 | OUTPATIENT
Start: 2018-06-11 | End: 2018-07-10

## 2018-06-11 RX ORDER — CLOPIDOGREL BISULFATE 75 MG/1
1 TABLET, FILM COATED ORAL
Qty: 30 | Refills: 0 | OUTPATIENT
Start: 2018-06-11 | End: 2018-07-10

## 2018-06-11 RX ORDER — ASPIRIN/CALCIUM CARB/MAGNESIUM 324 MG
1 TABLET ORAL
Qty: 0 | Refills: 0 | COMMUNITY
Start: 2018-06-11

## 2018-06-11 RX ORDER — TRAZODONE HCL 50 MG
1 TABLET ORAL
Qty: 30 | Refills: 0 | OUTPATIENT
Start: 2018-06-11 | End: 2018-07-10

## 2018-06-11 RX ORDER — AMLODIPINE BESYLATE 2.5 MG/1
1 TABLET ORAL
Qty: 30 | Refills: 0 | OUTPATIENT
Start: 2018-06-11 | End: 2018-07-10

## 2018-06-11 RX ORDER — ATORVASTATIN CALCIUM 80 MG/1
1 TABLET, FILM COATED ORAL
Qty: 30 | Refills: 0 | OUTPATIENT
Start: 2018-06-11 | End: 2018-07-10

## 2018-06-11 RX ADMIN — PANTOPRAZOLE SODIUM 40 MILLIGRAM(S): 20 TABLET, DELAYED RELEASE ORAL at 06:07

## 2018-06-11 RX ADMIN — AMLODIPINE BESYLATE 5 MILLIGRAM(S): 2.5 TABLET ORAL at 06:07

## 2018-06-11 RX ADMIN — ATORVASTATIN CALCIUM 40 MILLIGRAM(S): 80 TABLET, FILM COATED ORAL at 21:36

## 2018-06-11 RX ADMIN — Medication 1 ENEMA: at 16:07

## 2018-06-11 RX ADMIN — CLOPIDOGREL BISULFATE 75 MILLIGRAM(S): 75 TABLET, FILM COATED ORAL at 11:19

## 2018-06-11 RX ADMIN — POLYETHYLENE GLYCOL 3350 17 GRAM(S): 17 POWDER, FOR SOLUTION ORAL at 11:19

## 2018-06-11 RX ADMIN — Medication 100 MILLIGRAM(S): at 17:03

## 2018-06-11 RX ADMIN — SENNA PLUS 2 TABLET(S): 8.6 TABLET ORAL at 21:36

## 2018-06-11 RX ADMIN — CARBIDOPA AND LEVODOPA 1 TABLET(S): 25; 100 TABLET ORAL at 06:07

## 2018-06-11 RX ADMIN — MEMANTINE HYDROCHLORIDE 5 MILLIGRAM(S): 10 TABLET ORAL at 21:36

## 2018-06-11 RX ADMIN — Medication 81 MILLIGRAM(S): at 11:19

## 2018-06-11 RX ADMIN — CARBIDOPA AND LEVODOPA 1 TABLET(S): 25; 100 TABLET ORAL at 13:23

## 2018-06-11 RX ADMIN — Medication 100 MILLIGRAM(S): at 06:07

## 2018-06-11 RX ADMIN — Medication 50 MILLIGRAM(S): at 11:19

## 2018-06-11 RX ADMIN — CARBIDOPA AND LEVODOPA 1 TABLET(S): 25; 100 TABLET ORAL at 17:02

## 2018-06-11 NOTE — DISCHARGE NOTE ADULT - HOSPITAL COURSE
66 y/o M pt with PMHx of Blindness of the R Eye, CVA (non-verbal at baseline), HTN, HLD, and Parkinson's Disease sent to ED by home health aide due to abdominal pain x4 days. Pt's home health aide reports pt's sx's began x4 days prior to admission; home health aide notified her agency at the time but nothing was done, and upon returning to work with the pt the day prior to admission, noticed pt was still experiencing abdominal pain and decided to bring pt into the ED for evaluation. In ED, pt was pointing to the abdomen when asked what hurts. At Ed abdominal CT was done which showed Large amount of stool throughout the colon may indicate constipation.  Home health aide denied pt fever, chills, vomiting, diarrhea, or any other complaints. Extended Hx and HPI limited due to pt's condition.   Patient admitted to medicine for further management.     Constipation.    CT Abdomen and Pelvis w/ IV Cont showed Large amount of stool throughout the colon may indicate constipation.  s/p fleet enema with large BM. Pt was continued with Miralax, senna and colace  GI Sunil consulted -- CEA ordered and was within normal limits.   Pt had colonoscopy performed, which could not be completed.  Dr. Barber, GI, recommended  followup with colonoscopy in the outpatient setting.      CVA (cerebral vascular accident).   Pt continued on Plavix, statin and ASA  aspiration and fall precautions.     HTN (hypertension).    BP monitored/stable  Pt continued with Norvasc.     HLD (hyperlipidemia).    Pt was continued with atorvastatin.     Parkinson's disease.    Pt was continued with sinemet.     Need for prophylactic measure.   IMPROVE SCORE 2  SCD boots ordered  continue Protonix.     Attending cleared patient for discharge with followup with Dr. Barber for colonoscopy in the outpatient setting.  Pt is to continue with   bowel regimen and diet.  Pt's legal guardian resumed HHA.  Pt to followup with PCP in one week. 66 y/o M pt with PMHx of Blindness of the R Eye, CVA (non-verbal at baseline), HTN, HLD, and Parkinson's Disease sent to ED by home health aide due to abdominal pain x4 days. Pt's home health aide reports pt's sx's began x4 days prior to admission; home health aide notified her agency at the time but nothing was done, and upon returning to work with the pt the day prior to admission, noticed pt was still experiencing abdominal pain and decided to bring pt into the ED for evaluation. In ED, pt was pointing to the abdomen when asked what hurts. At Ed abdominal CT was done which showed Large amount of stool throughout the colon may indicate constipation.  Home health aide denied pt fever, chills, vomiting, diarrhea, or any other complaints. Extended Hx and HPI limited due to pt's condition.   Patient admitted to medicine for further management.     Constipation.    CT Abdomen and Pelvis w/ IV Cont showed Large amount of stool throughout the colon may indicate constipation.  s/p fleet enema with large BM. Pt was continued with Miralax, senna and colace  GI Sunil consulted -- CEA ordered and was within normal limits.   Pt had colonoscopy performed, which could not be completed.  Dr. Barber, GI, recommended  followup with colonoscopy in the outpatient setting.      CVA (cerebral vascular accident).   Pt continued on Plavix, statin and ASA  aspiration and fall precautions.     HTN (hypertension).    BP monitored/stable  Pt continued with Norvasc.     HLD (hyperlipidemia).    Pt was continued with atorvastatin.     Parkinson's disease.    Pt was continued with sinemet.     Need for prophylactic measure.   IMPROVE SCORE 2  SCD boots ordered  continue Protonix.     Attending cleared patient for discharge with followup with Dr. Barber for colonoscopy in the outpatient setting, as well as with Dr. James, heme-onc.  Pt is to continue with   bowel regimen and diet.  Pt's legal guardian resumed HHA.  Pt to followup with PCP in one week.

## 2018-06-11 NOTE — PROGRESS NOTE ADULT - PROBLEM SELECTOR PLAN 1
CT Abdomen and Pelvis w/ IV Cont showed Large amount of stool throughout the colon may indicate constipation.  s/p fleet enema with large BM. continue Miralax, senna and colace  GI Sunil consulted -- CEA ordered and was within normal limits.  OUT PT REPEAT GI BYRD

## 2018-06-11 NOTE — DIETITIAN INITIAL EVALUATION ADULT. - OTHER INFO
Pt visited. D/W HHA at bedside. Per HHA pt is on Purred diet and thickened Liquids at home. HHA was  provided with Tips On Nectar liquids and  Hgh fiber diet. Pt admitted for Constipation. Pt visited. D/W HHA at bedside. Po intake 100 % of meal. Per HHA pt is on Purred diet and thickened Liquids at home. HHA was  provided with Tips On Nectar liquids and  Hgh fiber diet. Pt admitted for Constipation.

## 2018-06-11 NOTE — DISCHARGE NOTE ADULT - MEDICATION SUMMARY - MEDICATIONS TO STOP TAKING
I will STOP taking the medications listed below when I get home from the hospital:    magnesium hydroxide 8% oral suspension  -- 30 milliliter(s) by mouth once a day, As needed, Constipation    metoprolol tartrate 25 mg oral tablet  -- 1 tab(s) by mouth 2 times a day    Augmentin 875 mg-125 mg oral tablet  -- 1 tab(s) by mouth 2 times a day   -- Finish all this medication unless otherwise directed by prescriber.  Take with food or milk.

## 2018-06-11 NOTE — DISCHARGE NOTE ADULT - PATIENT PORTAL LINK FT
You can access the "[employer] Patient Portal, offered by [facility], by registering with the following website: [email or web address]

## 2018-06-11 NOTE — DISCHARGE NOTE ADULT - PLAN OF CARE
resolution Continue with bowel regimen, and call Dr. Barber GI, to schedule an appointment for an outpatient colonoscopy. Continue with aspirin, plavix, and followup with PCP in one week. Continue with statin Continue with antihypertensive medication and DASH diet Continue with Sinemet Continue with bowel regimen, and call Dr. Barber, GI, to schedule an appointment for an outpatient colonoscopy.  Followup with Dr. James, heme-onc, in one week for further care.

## 2018-06-11 NOTE — DISCHARGE NOTE ADULT - ADDITIONAL INSTRUCTIONS
wound care:  Cleanse bilateral heels with normal saline followed by skin prep.  the nurse will supply you with some skin prep.    Float heels off pillow

## 2018-06-11 NOTE — PROGRESS NOTE ADULT - SUBJECTIVE AND OBJECTIVE BOX
Patient was seen and examined  Patient is a 67y old  Male who presents with a chief complaint of constipation and abdominal pain (11 Jun 2018 12:29)      INTERVAL HPI/OVERNIGHT EVENTS:  T(C): 36.9 (06-11-18 @ 13:43), Max: 36.9 (06-11-18 @ 13:43)  HR: 108 (06-11-18 @ 13:43) (74 - 108)  BP: 128/69 (06-11-18 @ 13:43) (128/69 - 141/85)  RR: 18 (06-11-18 @ 13:43) (16 - 18)  SpO2: 95% (06-11-18 @ 13:43) (94% - 99%)  Wt(kg): --  I&O's Summary      LABS:                        14.0   5.6   )-----------( 205      ( 11 Jun 2018 08:25 )             43.2     06-11    142  |  109<H>  |  10  ----------------------------<  84  3.9   |  26  |  0.75    Ca    8.8      11 Jun 2018 08:25          CAPILLARY BLOOD GLUCOSE                  MEDICATIONS  (STANDING):  amLODIPine   Tablet 5 milliGRAM(s) Oral daily  aspirin  chewable 81 milliGRAM(s) Oral daily  atorvastatin 40 milliGRAM(s) Oral at bedtime  carbidopa/levodopa  25/100 1 Tablet(s) Oral <User Schedule>  clopidogrel Tablet 75 milliGRAM(s) Oral daily  dextrose 5% + sodium chloride 0.9%. 1000 milliLiter(s) (65 mL/Hr) IV Continuous <Continuous>  docusate sodium 100 milliGRAM(s) Oral two times a day  memantine 5 milliGRAM(s) Oral at bedtime  pantoprazole    Tablet 40 milliGRAM(s) Oral before breakfast  polyethylene glycol 3350 17 Gram(s) Oral daily  senna 2 Tablet(s) Oral at bedtime  traZODone 50 milliGRAM(s) Oral daily    MEDICATIONS  (PRN):  guaiFENesin   Syrup  (Sugar-Free) 100 milliGRAM(s) Oral every 6 hours PRN Cough      RADIOLOGY & ADDITIONAL TESTS:    Imaging Personally Reviewed:  [ ] YES  [ ] NO    REVIEW OF SYSTEMS:  CONSTITUTIONAL: No fever, weight loss, or fatigue  EYES: No eye pain, visual disturbances, or discharge  ENMT:  No difficulty hearing, tinnitus, vertigo; No sinus or throat pain  NECK: No pain or stiffness  BREASTS: No pain, masses, or nipple discharge  RESPIRATORY: No cough, wheezing, chills or hemoptysis; No shortness of breath  CARDIOVASCULAR: No chest pain, palpitations, dizziness, or leg swelling  GASTROINTESTINAL: No abdominal or epigastric pain. No nausea, vomiting, or hematemesis; No diarrhea or constipation. No melena or hematochezia.  GENITOURINARY: No dysuria, frequency, hematuria, or incontinence  NEUROLOGICAL: No headaches, memory loss, loss of strength, numbness, or tremors  SKIN: No itching, burning, rashes, or lesions   LYMPH NODES: No enlarged glands  ENDOCRINE: No heat or cold intolerance; No hair loss  MUSCULOSKELETAL: No joint pain or swelling; No muscle, back, or extremity pain  PSYCHIATRIC: No depression, anxiety, mood swings, or difficulty sleeping  HEME/LYMPH: No easy bruising, or bleeding gums  ALLERY AND IMMUNOLOGIC: No hives or eczema      Consultant(s) Notes Reviewed:  [ ] YES  [ ] NO    PHYSICAL EXAM:  GENERAL: NAD, well-groomed, well-developed  HEAD:  Atraumatic, Normocephalic  EYES: EOMI, PERRLA, conjunctiva and sclera clear  ENMT: No tonsillar erythema, exudates, or enlargement; Moist mucous membranes, Good dentition, No lesions  NECK: Supple, No JVD, Normal thyroid  NERVOUS SYSTEM:  Alert & Oriented X3, Good concentration; Motor Strength 5/5 B/L upper and lower extremities; DTRs 2+ intact and symmetric  CHEST/LUNG: Clear to percussion bilaterally; No rales, rhonchi, wheezing, or rubs  HEART: Regular rate and rhythm; No murmurs, rubs, or gallops  ABDOMEN: Soft, Nontender, Nondistended; Bowel sounds present  EXTREMITIES:  2+ Peripheral Pulses, No clubbing, cyanosis, or edema  LYMPH: No lymphadenopathy noted  SKIN: No rashes or lesions    Care Discussed with Consultants/Other Providers [ x] YES  [ ] NO

## 2018-06-11 NOTE — DISCHARGE NOTE ADULT - PROVIDER TOKENS
TOKEN:'[id number]',FREE:[LAST:[MD Bonnie],FIRST:[Alber],PHONE:[phone]:[(   )    -]] TOKEN:'[id number]',FREE:[LAST:[MD Bonnie],FIRST:[Alber],PHONE:[phone]],FAX:[(   )    -]],TOKEN:'[id number]'

## 2018-06-11 NOTE — DISCHARGE NOTE ADULT - MEDICATION SUMMARY - MEDICATIONS TO TAKE
I will START or STAY ON the medications listed below when I get home from the hospital:    aspirin 81 mg oral tablet, chewable  -- 1 tab(s) by mouth once a day  -- Indication: For CAD    magnesium hydroxide 8% oral suspension  -- 30 milliliter(s) by mouth once a day, As needed, Constipation  -- Indication: For GI ppx    traZODone 50 mg oral tablet  -- 1 tab(s) by mouth once a day  -- Indication: For Depression    atorvastatin 40 mg oral tablet  -- 1 tab(s) by mouth once a day (at bedtime)  -- Indication: For HLD (hyperlipidemia)    carbidopa-levodopa 25 mg-100 mg oral tablet  -- 1 tab(s) by mouth 3 times a day at 0700 hrs, 2PM, and 6 PM  -- Indication: For Parkinson's disease    clopidogrel 75 mg oral tablet  -- 1 tab(s) by mouth once a day  -- Indication: For HLD (hyperlipidemia)    amLODIPine 5 mg oral tablet  -- 1 tab(s) by mouth once a day  -- Indication: For HTN (hypertension)    polyethylene glycol 3350 oral powder for reconstitution  -- 17 gram(s) by mouth once a day  -- Indication: For Constipation    senna oral tablet  -- 2 tab(s) by mouth once a day (at bedtime)  -- Indication: For Constipation    docusate sodium 100 mg oral capsule  -- 1 cap(s) by mouth 2 times a day  -- Indication: For Constipation    memantine 5 mg oral tablet  -- 1 tab(s) by mouth once a day (at bedtime)  -- Indication: For Dementia    Lubricant Eye Drops ophthalmic solution  -- 1 drop(s) to each affected eye 2 times a day  -- Indication: For Dry eye    pantoprazole 40 mg oral delayed release tablet  -- 1 tab(s) by mouth once a day (before a meal)  -- Indication: For GI ppx    Vitamin C 250 mg oral tablet  -- 1 tab(s) by mouth once a day  -- Indication: For supplement    ergocalciferol 50,000 intl units (1.25 mg) oral capsule  -- 1 cap(s) by mouth once a week on Thursday   and repeat vit D level in August and readjust the medication as per PMD  -- Indication: For Vit D defic

## 2018-06-11 NOTE — DISCHARGE NOTE ADULT - CARE PROVIDER_API CALL
Edwardo Barber (DO), Cardiology Medicine  [address] [place] [address]  Phone: [phone]  Fax: [phone]    MD Nicole David  Phone: [phone]  Fax: (   )    - Edwardo Barber (DO), Cardiology Medicine  [facility] [place] [address]  Phone: [phone]  Fax: [phone]    MD Nicole David  Phone: [phone]  Fax: (   )    -    Brannon James), Internal Medicine; Medical Oncology  [address] [place] [address]  Phone: [phone]  Fax: [phone]

## 2018-06-11 NOTE — DISCHARGE NOTE ADULT - CARE PLAN
Principal Discharge DX:	Constipation  Goal:	resolution  Assessment and plan of treatment:	Continue with bowel regimen, and call Dr. Barber GI, to schedule an appointment for an outpatient colonoscopy.  Secondary Diagnosis:	CVA (cerebral vascular accident)  Assessment and plan of treatment:	Continue with aspirin, plavix, and followup with PCP in one week.  Secondary Diagnosis:	HLD (hyperlipidemia)  Assessment and plan of treatment:	Continue with statin  Secondary Diagnosis:	HTN (hypertension)  Assessment and plan of treatment:	Continue with antihypertensive medication and DASH diet  Secondary Diagnosis:	Parkinson's disease  Assessment and plan of treatment:	Continue with Sinemet Principal Discharge DX:	Constipation  Goal:	resolution  Assessment and plan of treatment:	Continue with bowel regimen, and call Dr. Barber, GI, to schedule an appointment for an outpatient colonoscopy.  Followup with Dr. James, heme-onc, in one week for further care.  Secondary Diagnosis:	CVA (cerebral vascular accident)  Assessment and plan of treatment:	Continue with aspirin, plavix, and followup with PCP in one week.  Secondary Diagnosis:	HLD (hyperlipidemia)  Assessment and plan of treatment:	Continue with statin  Secondary Diagnosis:	HTN (hypertension)  Assessment and plan of treatment:	Continue with antihypertensive medication and DASH diet  Secondary Diagnosis:	Parkinson's disease  Assessment and plan of treatment:	Continue with Sinemet

## 2018-06-11 NOTE — DIETITIAN INITIAL EVALUATION ADULT. - PROBLEM SELECTOR PLAN 1
presents with abdominal pain   ABd CT shows Large amount of stool throughout the colon   could be 2/2 advance Parkinson disease   NPO, start diet as abdominal pain improves   s/p fleet enema   pt had large BM after enema  will start Miralax daily with senna and colace  GI: Dr Barber

## 2018-06-12 RX ADMIN — CARBIDOPA AND LEVODOPA 1 TABLET(S): 25; 100 TABLET ORAL at 17:21

## 2018-06-12 RX ADMIN — Medication 50 MILLIGRAM(S): at 12:28

## 2018-06-12 RX ADMIN — Medication 81 MILLIGRAM(S): at 12:25

## 2018-06-12 RX ADMIN — PANTOPRAZOLE SODIUM 40 MILLIGRAM(S): 20 TABLET, DELAYED RELEASE ORAL at 05:32

## 2018-06-12 RX ADMIN — MEMANTINE HYDROCHLORIDE 5 MILLIGRAM(S): 10 TABLET ORAL at 21:34

## 2018-06-12 RX ADMIN — ATORVASTATIN CALCIUM 40 MILLIGRAM(S): 80 TABLET, FILM COATED ORAL at 21:34

## 2018-06-12 RX ADMIN — SENNA PLUS 2 TABLET(S): 8.6 TABLET ORAL at 21:34

## 2018-06-12 RX ADMIN — CARBIDOPA AND LEVODOPA 1 TABLET(S): 25; 100 TABLET ORAL at 13:10

## 2018-06-12 RX ADMIN — Medication 100 MILLIGRAM(S): at 17:21

## 2018-06-12 RX ADMIN — CARBIDOPA AND LEVODOPA 1 TABLET(S): 25; 100 TABLET ORAL at 05:32

## 2018-06-12 RX ADMIN — Medication 100 MILLIGRAM(S): at 05:32

## 2018-06-12 RX ADMIN — POLYETHYLENE GLYCOL 3350 17 GRAM(S): 17 POWDER, FOR SOLUTION ORAL at 12:28

## 2018-06-12 RX ADMIN — CLOPIDOGREL BISULFATE 75 MILLIGRAM(S): 75 TABLET, FILM COATED ORAL at 12:25

## 2018-06-12 NOTE — PROGRESS NOTE ADULT - PROBLEM SELECTOR PLAN 7
IMPROVE SCORE 2  SCD boots ordered  continue Protonix

## 2018-06-12 NOTE — PROGRESS NOTE ADULT - PROBLEM SELECTOR PROBLEM 5
Parkinson's disease

## 2018-06-12 NOTE — PROGRESS NOTE ADULT - PROBLEM SELECTOR PLAN 4
Pt was continued with atorvastatin
continue atorvastatin  continue TLC diet
Pt was continued with atorvastatin
continue atorvastatin  continue TLC diet

## 2018-06-12 NOTE — PROGRESS NOTE ADULT - PROBLEM SELECTOR PLAN 2
Pt continued on Plavix, statin and ASA  aspiration and fall precautions.
continue Plavix, statin and ASA  maintain aspiration and fall precautions
Pt continued on Plavix, statin and ASA  aspiration and fall precautions.
continue Plavix, statin and ASA  maintain aspiration and fall precautions

## 2018-06-12 NOTE — PROGRESS NOTE ADULT - PROVIDER SPECIALTY LIST ADULT
Gastroenterology
Internal Medicine
Gastroenterology

## 2018-06-12 NOTE — PROGRESS NOTE ADULT - PROBLEM SELECTOR PROBLEM 1
Constipation
Constipation, unspecified constipation type
Constipation

## 2018-06-12 NOTE — PROGRESS NOTE ADULT - PROBLEM SELECTOR PLAN 6
Patient with HHA  NP spoke with emergency contact:  Britt Chadwick today  regarding setting up HHA post colonoscopy/if tolerate diet,  and she stated CANNOT START HHA's over the weekend.  She will  resume HHA on tuesday for discharge.

## 2018-06-12 NOTE — PROGRESS NOTE ADULT - ASSESSMENT
66 y/o M pt with PMHx of Blindness of the R Eye, CVA (non-verbal at baseline), HTN, HLD, and Parkinson's Disease sent to ED by home health aide due to abdominal pain x4 days. Pt's home health aide reports pt's sx's began x4 days prior to admission; home health aide notified her agency at the time but nothing was done, and upon returning to work with the pt the day prior to admission, noticed pt was still experiencing abdominal pain and decided to bring pt into the ED for evaluation. In ED, pt was pointing to the abdomen when asked what hurts. At Ed abdominal CT was done which showed Large amount of stool throughout the colon may indicate constipation.  Home health aide denies pt fever, chills, vomiting, diarrhea, or any other complaints. Extended Hx and HPI limited due to pt's condition.   Patient admitted to medicine for further management.
66 y/o M pt with PMHx of Blindness of the R Eye, CVA (non-verbal at baseline), HTN, HLD, and Parkinson's Disease sent to ED by home health aide due to abdominal pain x4 days. Pt's home health aide reports pt's sx's began x4 days prior to admission; home health aide notified her agency at the time but nothing was done, and upon returning to work with the pt the day prior to admission, noticed pt was still experiencing abdominal pain and decided to bring pt into the ED for evaluation. In ED, pt was pointing to the abdomen when asked what hurts. At Ed abdominal CT was done which showed Large amount of stool throughout the colon may indicate constipation.  Home health aide denies pt fever, chills, vomiting, diarrhea, or any other complaints. Extended Hx and HPI limited due to pt's condition.   Patient admitted to medicine for further management.
68 y/o M pt with PMHx of Blindness of the R Eye, CVA (non-verbal at baseline), HTN, HLD, and Parkinson's Disease sent to ED by home health aide due to abdominal pain x4 days. Pt's home health aide reports pt's sx's began x4 days prior to admission; home health aide notified her agency at the time but nothing was done, and upon returning to work with the pt the day prior to admission, noticed pt was still experiencing abdominal pain and decided to bring pt into the ED for evaluation. In ED, pt was pointing to the abdomen when asked what hurts. At Ed abdominal CT was done which showed Large amount of stool throughout the colon may indicate constipation.  Home health aide denies pt fever, chills, vomiting, diarrhea, or any other complaints. Extended Hx and HPI limited due to pt's condition.   Patient admitted to medicine for further management.
66 y/o M pt with PMHx of Blindness of the R Eye, CVA (non-verbal at baseline), HTN, HLD, and Parkinson's Disease sent to ED by home health aide due to abdominal pain x4 days. Pt's home health aide reports pt's sx's began x4 days prior to admission; home health aide notified her agency at the time but nothing was done, and upon returning to work with the pt the day prior to admission, noticed pt was still experiencing abdominal pain and decided to bring pt into the ED for evaluation. In ED, pt was pointing to the abdomen when asked what hurts. At Ed abdominal CT was done which showed Large amount of stool throughout the colon may indicate constipation.  Home health aide denies pt fever, chills, vomiting, diarrhea, or any other complaints. Extended Hx and HPI limited due to pt's condition.   Patient admitted to medicine for further management.
68 y/o M pt with PMHx of Blindness of the R Eye, CVA (non-verbal at baseline), HTN, HLD, and Parkinson's Disease sent to ED by home health aide due to abdominal pain x4 days 2/2 constipation now resolved w/ bowel regimen.
68 y/o M pt with PMHx of Blindness of the R Eye, CVA (non-verbal at baseline), HTN, HLD, and Parkinson's Disease sent to ED by home health aide due to abdominal pain x4 days. Pt's home health aide reports pt's sx's began x4 days prior to admission; home health aide notified her agency at the time but nothing was done, and upon returning to work with the pt the day prior to admission, noticed pt was still experiencing abdominal pain and decided to bring pt into the ED for evaluation. In ED, pt was pointing to the abdomen when asked what hurts. At Ed abdominal CT was done which showed Large amount of stool throughout the colon may indicate constipation.  Home health aide denies pt fever, chills, vomiting, diarrhea, or any other complaints. Extended Hx and HPI limited due to pt's condition.   Patient admitted to medicine for further management.
1. Fecal impaction  2. S/p incomplte colonoscopy due to poor prep    Suggestions:    1. Repeat colonoscopy out patient  2. Monitor electrolytes  3. Stool softener / Laxative  4. GI and DVT prophylaxis
1. Abdominal pain  2. Fecal impaction	  3. R/o obstructing colorectal neoplasm    Suggestions:    1. Check CEA level  2. Colonoscopy  3. Avoid NSAID  4. DVT prophylaxis

## 2018-06-12 NOTE — PROGRESS NOTE ADULT - PROBLEM SELECTOR PLAN 5
continue sinemet

## 2018-06-12 NOTE — PROGRESS NOTE ADULT - PROBLEM SELECTOR PROBLEM 6
Discharge planning issues

## 2018-06-12 NOTE — PROGRESS NOTE ADULT - PROBLEM SELECTOR PLAN 3
continue Norvasc  continue to monitor BP per floor protocol   continue DASH diet
BP monitored/stable  Pt continued with Norvasc
continue Norvasc  continue to monitor BP per floor protocol   continue DASH diet
BP monitored/stable  Pt continued with Norvasc

## 2018-06-13 VITALS
SYSTOLIC BLOOD PRESSURE: 128 MMHG | DIASTOLIC BLOOD PRESSURE: 70 MMHG | TEMPERATURE: 99 F | RESPIRATION RATE: 18 BRPM | HEART RATE: 106 BPM | OXYGEN SATURATION: 97 %

## 2018-06-13 PROCEDURE — 74018 RADEX ABDOMEN 1 VIEW: CPT

## 2018-06-13 PROCEDURE — 83036 HEMOGLOBIN GLYCOSYLATED A1C: CPT

## 2018-06-13 PROCEDURE — 84443 ASSAY THYROID STIM HORMONE: CPT

## 2018-06-13 PROCEDURE — 85027 COMPLETE CBC AUTOMATED: CPT

## 2018-06-13 PROCEDURE — 83690 ASSAY OF LIPASE: CPT

## 2018-06-13 PROCEDURE — 97110 THERAPEUTIC EXERCISES: CPT

## 2018-06-13 PROCEDURE — 82378 CARCINOEMBRYONIC ANTIGEN: CPT

## 2018-06-13 PROCEDURE — 99285 EMERGENCY DEPT VISIT HI MDM: CPT | Mod: 25

## 2018-06-13 PROCEDURE — 80061 LIPID PANEL: CPT

## 2018-06-13 PROCEDURE — 84100 ASSAY OF PHOSPHORUS: CPT

## 2018-06-13 PROCEDURE — 71045 X-RAY EXAM CHEST 1 VIEW: CPT

## 2018-06-13 PROCEDURE — 83735 ASSAY OF MAGNESIUM: CPT

## 2018-06-13 PROCEDURE — 82607 VITAMIN B-12: CPT

## 2018-06-13 PROCEDURE — 80053 COMPREHEN METABOLIC PANEL: CPT

## 2018-06-13 PROCEDURE — 74177 CT ABD & PELVIS W/CONTRAST: CPT

## 2018-06-13 PROCEDURE — 97530 THERAPEUTIC ACTIVITIES: CPT

## 2018-06-13 PROCEDURE — 97163 PT EVAL HIGH COMPLEX 45 MIN: CPT

## 2018-06-13 PROCEDURE — 96374 THER/PROPH/DIAG INJ IV PUSH: CPT | Mod: XU

## 2018-06-13 PROCEDURE — 82746 ASSAY OF FOLIC ACID SERUM: CPT

## 2018-06-13 PROCEDURE — 80048 BASIC METABOLIC PNL TOTAL CA: CPT

## 2018-06-13 RX ADMIN — Medication 100 MILLIGRAM(S): at 05:52

## 2018-06-13 RX ADMIN — CARBIDOPA AND LEVODOPA 1 TABLET(S): 25; 100 TABLET ORAL at 13:38

## 2018-06-13 RX ADMIN — AMLODIPINE BESYLATE 5 MILLIGRAM(S): 2.5 TABLET ORAL at 05:52

## 2018-06-13 RX ADMIN — Medication 81 MILLIGRAM(S): at 11:41

## 2018-06-13 RX ADMIN — PANTOPRAZOLE SODIUM 40 MILLIGRAM(S): 20 TABLET, DELAYED RELEASE ORAL at 05:52

## 2018-06-13 RX ADMIN — POLYETHYLENE GLYCOL 3350 17 GRAM(S): 17 POWDER, FOR SOLUTION ORAL at 11:42

## 2018-06-13 RX ADMIN — CARBIDOPA AND LEVODOPA 1 TABLET(S): 25; 100 TABLET ORAL at 05:52

## 2018-06-13 RX ADMIN — Medication 50 MILLIGRAM(S): at 11:42

## 2018-06-13 RX ADMIN — CLOPIDOGREL BISULFATE 75 MILLIGRAM(S): 75 TABLET, FILM COATED ORAL at 11:42

## 2018-06-30 ENCOUNTER — INPATIENT (INPATIENT)
Facility: HOSPITAL | Age: 68
LOS: 9 days | Discharge: HOME | DRG: 179 | End: 2018-07-10
Attending: INTERNAL MEDICINE | Admitting: INTERNAL MEDICINE
Payer: MEDICARE

## 2018-06-30 VITALS
HEART RATE: 110 BPM | WEIGHT: 145.06 LBS | OXYGEN SATURATION: 96 % | RESPIRATION RATE: 20 BRPM | SYSTOLIC BLOOD PRESSURE: 137 MMHG | HEIGHT: 68 IN | DIASTOLIC BLOOD PRESSURE: 94 MMHG | TEMPERATURE: 98 F

## 2018-06-30 DIAGNOSIS — Z98.89 OTHER SPECIFIED POSTPROCEDURAL STATES: Chronic | ICD-10-CM

## 2018-06-30 DIAGNOSIS — J69.0 PNEUMONITIS DUE TO INHALATION OF FOOD AND VOMIT: ICD-10-CM

## 2018-06-30 LAB
ACETONE SERPL-MCNC: NEGATIVE — SIGNIFICANT CHANGE UP
ALBUMIN SERPL ELPH-MCNC: 3.9 G/DL — SIGNIFICANT CHANGE UP (ref 3.5–5)
ALP SERPL-CCNC: 101 U/L — SIGNIFICANT CHANGE UP (ref 40–120)
ALT FLD-CCNC: 14 U/L DA — SIGNIFICANT CHANGE UP (ref 10–60)
ANION GAP SERPL CALC-SCNC: 8 MMOL/L — SIGNIFICANT CHANGE UP (ref 5–17)
APTT BLD: 29.5 SEC — SIGNIFICANT CHANGE UP (ref 27.5–37.4)
AST SERPL-CCNC: 11 U/L — SIGNIFICANT CHANGE UP (ref 10–40)
BASOPHILS # BLD AUTO: 0.1 K/UL — SIGNIFICANT CHANGE UP (ref 0–0.2)
BASOPHILS NFR BLD AUTO: 1 % — SIGNIFICANT CHANGE UP (ref 0–2)
BILIRUB SERPL-MCNC: 0.7 MG/DL — SIGNIFICANT CHANGE UP (ref 0.2–1.2)
BUN SERPL-MCNC: 13 MG/DL — SIGNIFICANT CHANGE UP (ref 7–18)
CALCIUM SERPL-MCNC: 8.9 MG/DL — SIGNIFICANT CHANGE UP (ref 8.4–10.5)
CHLORIDE SERPL-SCNC: 110 MMOL/L — HIGH (ref 96–108)
CK MB BLD-MCNC: <3.6 % — HIGH (ref 0–3.5)
CK MB CFR SERPL CALC: <1 NG/ML — SIGNIFICANT CHANGE UP (ref 0–3.6)
CK SERPL-CCNC: 28 U/L — LOW (ref 35–232)
CO2 SERPL-SCNC: 26 MMOL/L — SIGNIFICANT CHANGE UP (ref 22–31)
CREAT SERPL-MCNC: 0.83 MG/DL — SIGNIFICANT CHANGE UP (ref 0.5–1.3)
EOSINOPHIL # BLD AUTO: 0.1 K/UL — SIGNIFICANT CHANGE UP (ref 0–0.5)
EOSINOPHIL NFR BLD AUTO: 1.1 % — SIGNIFICANT CHANGE UP (ref 0–6)
GLUCOSE SERPL-MCNC: 84 MG/DL — SIGNIFICANT CHANGE UP (ref 70–99)
HCT VFR BLD CALC: 44.8 % — SIGNIFICANT CHANGE UP (ref 39–50)
HGB BLD-MCNC: 14.1 G/DL — SIGNIFICANT CHANGE UP (ref 13–17)
INR BLD: 1.12 RATIO — SIGNIFICANT CHANGE UP (ref 0.88–1.16)
LACTATE SERPL-SCNC: 1 MMOL/L — SIGNIFICANT CHANGE UP (ref 0.7–2)
LIDOCAIN IGE QN: 169 U/L — SIGNIFICANT CHANGE UP (ref 73–393)
LYMPHOCYTES # BLD AUTO: 2.7 K/UL — SIGNIFICANT CHANGE UP (ref 1–3.3)
LYMPHOCYTES # BLD AUTO: 31.7 % — SIGNIFICANT CHANGE UP (ref 13–44)
MAGNESIUM SERPL-MCNC: 2.3 MG/DL — SIGNIFICANT CHANGE UP (ref 1.6–2.6)
MCHC RBC-ENTMCNC: 28.8 PG — SIGNIFICANT CHANGE UP (ref 27–34)
MCHC RBC-ENTMCNC: 31.5 GM/DL — LOW (ref 32–36)
MCV RBC AUTO: 91.5 FL — SIGNIFICANT CHANGE UP (ref 80–100)
MONOCYTES # BLD AUTO: 0.7 K/UL — SIGNIFICANT CHANGE UP (ref 0–0.9)
MONOCYTES NFR BLD AUTO: 7.7 % — SIGNIFICANT CHANGE UP (ref 2–14)
NEUTROPHILS # BLD AUTO: 5 K/UL — SIGNIFICANT CHANGE UP (ref 1.8–7.4)
NEUTROPHILS NFR BLD AUTO: 58.5 % — SIGNIFICANT CHANGE UP (ref 43–77)
NT-PROBNP SERPL-SCNC: 74 PG/ML — SIGNIFICANT CHANGE UP (ref 0–125)
PLATELET # BLD AUTO: 231 K/UL — SIGNIFICANT CHANGE UP (ref 150–400)
POTASSIUM SERPL-MCNC: 3.9 MMOL/L — SIGNIFICANT CHANGE UP (ref 3.5–5.3)
POTASSIUM SERPL-SCNC: 3.9 MMOL/L — SIGNIFICANT CHANGE UP (ref 3.5–5.3)
PROT SERPL-MCNC: 7.4 G/DL — SIGNIFICANT CHANGE UP (ref 6–8.3)
PROTHROM AB SERPL-ACNC: 12.2 SEC — SIGNIFICANT CHANGE UP (ref 9.8–12.7)
RBC # BLD: 4.89 M/UL — SIGNIFICANT CHANGE UP (ref 4.2–5.8)
RBC # FLD: 13 % — SIGNIFICANT CHANGE UP (ref 10.3–14.5)
SODIUM SERPL-SCNC: 144 MMOL/L — SIGNIFICANT CHANGE UP (ref 135–145)
TROPONIN I SERPL-MCNC: <0.015 NG/ML — SIGNIFICANT CHANGE UP (ref 0–0.04)
WBC # BLD: 8.5 K/UL — SIGNIFICANT CHANGE UP (ref 3.8–10.5)
WBC # FLD AUTO: 8.5 K/UL — SIGNIFICANT CHANGE UP (ref 3.8–10.5)

## 2018-06-30 PROCEDURE — 71045 X-RAY EXAM CHEST 1 VIEW: CPT | Mod: 26

## 2018-06-30 PROCEDURE — 99285 EMERGENCY DEPT VISIT HI MDM: CPT

## 2018-06-30 RX ORDER — PIPERACILLIN AND TAZOBACTAM 4; .5 G/20ML; G/20ML
3.38 INJECTION, POWDER, LYOPHILIZED, FOR SOLUTION INTRAVENOUS ONCE
Qty: 0 | Refills: 0 | Status: COMPLETED | OUTPATIENT
Start: 2018-06-30 | End: 2018-06-30

## 2018-06-30 RX ORDER — ASPIRIN/CALCIUM CARB/MAGNESIUM 324 MG
81 TABLET ORAL ONCE
Qty: 0 | Refills: 0 | Status: COMPLETED | OUTPATIENT
Start: 2018-06-30 | End: 2018-06-30

## 2018-06-30 RX ORDER — SODIUM CHLORIDE 9 MG/ML
3 INJECTION INTRAMUSCULAR; INTRAVENOUS; SUBCUTANEOUS ONCE
Qty: 0 | Refills: 0 | Status: COMPLETED | OUTPATIENT
Start: 2018-06-30 | End: 2018-06-30

## 2018-06-30 RX ADMIN — PIPERACILLIN AND TAZOBACTAM 200 GRAM(S): 4; .5 INJECTION, POWDER, LYOPHILIZED, FOR SOLUTION INTRAVENOUS at 19:44

## 2018-06-30 RX ADMIN — SODIUM CHLORIDE 3 MILLILITER(S): 9 INJECTION INTRAMUSCULAR; INTRAVENOUS; SUBCUTANEOUS at 17:53

## 2018-06-30 NOTE — ED PROVIDER NOTE - OBJECTIVE STATEMENT
67 y.o. male with Parkinson BIBA as per HHA who is only with him for past few days, claims pt with sob since this am, coughing, chest congestion, vomiting upon ED arrival, no appetite, no fever,

## 2018-06-30 NOTE — H&P ADULT - PROBLEM SELECTOR PLAN 1
likely concern for aspiration pneumonia { as per pcp}  patient has no fevers and no leucocyte count   follow with procalcitonin   cxr : clear   follow with repeat  cxr in 48 hours to r/o  aspiration pneumonia   speech and swallow evaluation

## 2018-06-30 NOTE — H&P ADULT - NSHPLABSRESULTS_GEN_ALL_CORE
Vital Signs Last 24 Hrs  T(C): 36.9 (30 Jun 2018 16:49), Max: 36.9 (30 Jun 2018 16:49)  T(F): 98.4 (30 Jun 2018 16:49), Max: 98.4 (30 Jun 2018 16:49)  HR: 110 (30 Jun 2018 16:49) (110 - 110)  BP: 137/94 (30 Jun 2018 16:49) (137/94 - 137/94)  BP(mean): --  RR: 20 (30 Jun 2018 16:49) (20 - 20)  SpO2: 96% (30 Jun 2018 16:49) (96% - 96%)        LABS:                        14.1   8.5   )-----------( 231      ( 30 Jun 2018 17:49 )             44.8     06-30    144  |  110<H>  |  13  ----------------------------<  84  3.9   |  26  |  0.83    Ca    8.9      30 Jun 2018 17:49  Mg     2.3     06-30    TPro  7.4  /  Alb  3.9  /  TBili  0.7  /  DBili  x   /  AST  11  /  ALT  14  /  AlkPhos  101  06-30    PT/INR - ( 30 Jun 2018 17:49 )   PT: 12.2 sec;   INR: 1.12 ratio         PTT - ( 30 Jun 2018 17:49 )  PTT:29.5 sec    CAPILLARY BLOOD GLUCOSE          RADIOLOGY & ADDITIONAL TESTS:    Imaging Personally Reviewed: cxr : clear lungs

## 2018-06-30 NOTE — H&P ADULT - NSHPPHYSICALEXAM_GEN_ALL_CORE
PHYSICAL EXAM:  GENERAL: NAD, well-groomed, well-developed  HEAD:  Atraumatic, Normocephalic  EYES: EOMI, PERRLA, conjunctiva and sclera clear  NECK: Supple, No JVD, Normal thyroid  CHEST/LUNG: Clear to percussion bilaterally; No rales, rhonchi, wheezing, or rubs  HEART: Regular rate and rhythm; No murmurs, rubs, or gallops  ABDOMEN: Soft, Nontender, Nondistended; Bowel sounds present   neuro:  AAOX 0   EXTREMITIES:  2+ Peripheral Pulses, No clubbing, cyanosis, or edema  SKIN;intact

## 2018-06-30 NOTE — H&P ADULT - HISTORY OF PRESENT ILLNESS
66 y/o M pt with PMHx of Blindness of the R Eye, CVA (non-verbal at baseline), HTN, HLD, and Parkinson's Disease sent for A 68 y/o M pt with PMHx of Blindness of the R Eye, CVA (non-verbal at baseline), HTN, HLD, and Parkinson's Disease sent for HHA{ who is only with him for past few days and now currently  not at the bedside } for cough with sputum production  and associated with mild shortness of breath . AS PER the E.D charts patient HHA denies any nausea , vomiting , abdominal pain or any other acute complaints . patient was recently admitted for [place] for constipation  and discharged on bowel regimen .      IN the E.D patient vitals are stable   cxr : clear   labs :  no leucocytosis

## 2018-06-30 NOTE — ED PROVIDER NOTE - MUSCULOSKELETAL, MLM
Spine appears kyphotic, range of motion is not limited, no muscle or joint tenderness, generalized stiffness

## 2018-07-01 DIAGNOSIS — R06.02 SHORTNESS OF BREATH: ICD-10-CM

## 2018-07-01 DIAGNOSIS — I10 ESSENTIAL (PRIMARY) HYPERTENSION: ICD-10-CM

## 2018-07-01 DIAGNOSIS — I63.9 CEREBRAL INFARCTION, UNSPECIFIED: ICD-10-CM

## 2018-07-01 DIAGNOSIS — J69.0 PNEUMONITIS DUE TO INHALATION OF FOOD AND VOMIT: ICD-10-CM

## 2018-07-01 DIAGNOSIS — G20 PARKINSON'S DISEASE: ICD-10-CM

## 2018-07-01 DIAGNOSIS — E78.5 HYPERLIPIDEMIA, UNSPECIFIED: ICD-10-CM

## 2018-07-01 DIAGNOSIS — K59.00 CONSTIPATION, UNSPECIFIED: ICD-10-CM

## 2018-07-01 DIAGNOSIS — Z29.9 ENCOUNTER FOR PROPHYLACTIC MEASURES, UNSPECIFIED: ICD-10-CM

## 2018-07-01 LAB
FOLATE SERPL-MCNC: 10.2 NG/ML — SIGNIFICANT CHANGE UP
HBA1C BLD-MCNC: 5.3 % — SIGNIFICANT CHANGE UP (ref 4–5.6)
TROPONIN I SERPL-MCNC: <0.015 NG/ML — SIGNIFICANT CHANGE UP (ref 0–0.04)
TROPONIN I SERPL-MCNC: <0.015 NG/ML — SIGNIFICANT CHANGE UP (ref 0–0.04)
TSH SERPL-MCNC: 0.97 UU/ML — SIGNIFICANT CHANGE UP (ref 0.34–4.82)
VIT B12 SERPL-MCNC: 534 PG/ML — SIGNIFICANT CHANGE UP (ref 232–1245)

## 2018-07-01 RX ORDER — SENNA PLUS 8.6 MG/1
2 TABLET ORAL AT BEDTIME
Qty: 0 | Refills: 0 | Status: DISCONTINUED | OUTPATIENT
Start: 2018-07-01 | End: 2018-07-10

## 2018-07-01 RX ORDER — AMLODIPINE BESYLATE 2.5 MG/1
5 TABLET ORAL DAILY
Qty: 0 | Refills: 0 | Status: DISCONTINUED | OUTPATIENT
Start: 2018-07-01 | End: 2018-07-10

## 2018-07-01 RX ORDER — MEMANTINE HYDROCHLORIDE 10 MG/1
5 TABLET ORAL AT BEDTIME
Qty: 0 | Refills: 0 | Status: DISCONTINUED | OUTPATIENT
Start: 2018-07-01 | End: 2018-07-10

## 2018-07-01 RX ORDER — METRONIDAZOLE 500 MG
500 TABLET ORAL EVERY 8 HOURS
Qty: 0 | Refills: 0 | Status: DISCONTINUED | OUTPATIENT
Start: 2018-07-01 | End: 2018-07-03

## 2018-07-01 RX ORDER — CEFTRIAXONE 500 MG/1
1 INJECTION, POWDER, FOR SOLUTION INTRAMUSCULAR; INTRAVENOUS EVERY 24 HOURS
Qty: 0 | Refills: 0 | Status: DISCONTINUED | OUTPATIENT
Start: 2018-07-01 | End: 2018-07-02

## 2018-07-01 RX ORDER — CLOPIDOGREL BISULFATE 75 MG/1
75 TABLET, FILM COATED ORAL DAILY
Qty: 0 | Refills: 0 | Status: DISCONTINUED | OUTPATIENT
Start: 2018-07-01 | End: 2018-07-10

## 2018-07-01 RX ORDER — ASPIRIN/CALCIUM CARB/MAGNESIUM 324 MG
81 TABLET ORAL DAILY
Qty: 0 | Refills: 0 | Status: DISCONTINUED | OUTPATIENT
Start: 2018-07-01 | End: 2018-07-10

## 2018-07-01 RX ORDER — DOCUSATE SODIUM 100 MG
100 CAPSULE ORAL
Qty: 0 | Refills: 0 | Status: DISCONTINUED | OUTPATIENT
Start: 2018-07-01 | End: 2018-07-10

## 2018-07-01 RX ORDER — TRAZODONE HCL 50 MG
50 TABLET ORAL DAILY
Qty: 0 | Refills: 0 | Status: DISCONTINUED | OUTPATIENT
Start: 2018-07-01 | End: 2018-07-10

## 2018-07-01 RX ORDER — PANTOPRAZOLE SODIUM 20 MG/1
40 TABLET, DELAYED RELEASE ORAL
Qty: 0 | Refills: 0 | Status: DISCONTINUED | OUTPATIENT
Start: 2018-07-01 | End: 2018-07-10

## 2018-07-01 RX ORDER — CARBIDOPA AND LEVODOPA 25; 100 MG/1; MG/1
1 TABLET ORAL THREE TIMES A DAY
Qty: 0 | Refills: 0 | Status: DISCONTINUED | OUTPATIENT
Start: 2018-07-01 | End: 2018-07-03

## 2018-07-01 RX ORDER — POLYETHYLENE GLYCOL 3350 17 G/17G
17 POWDER, FOR SOLUTION ORAL DAILY
Qty: 0 | Refills: 0 | Status: DISCONTINUED | OUTPATIENT
Start: 2018-07-01 | End: 2018-07-10

## 2018-07-01 RX ORDER — ATORVASTATIN CALCIUM 80 MG/1
40 TABLET, FILM COATED ORAL AT BEDTIME
Qty: 0 | Refills: 0 | Status: DISCONTINUED | OUTPATIENT
Start: 2018-07-01 | End: 2018-07-10

## 2018-07-01 RX ADMIN — Medication 100 MILLIGRAM(S): at 06:09

## 2018-07-01 RX ADMIN — Medication 1 DROP(S): at 06:09

## 2018-07-01 RX ADMIN — Medication 1 DROP(S): at 17:28

## 2018-07-01 RX ADMIN — Medication 81 MILLIGRAM(S): at 12:01

## 2018-07-01 RX ADMIN — Medication 100 MILLIGRAM(S): at 13:22

## 2018-07-01 RX ADMIN — CEFTRIAXONE 100 GRAM(S): 500 INJECTION, POWDER, FOR SOLUTION INTRAMUSCULAR; INTRAVENOUS at 21:32

## 2018-07-01 RX ADMIN — AMLODIPINE BESYLATE 5 MILLIGRAM(S): 2.5 TABLET ORAL at 06:09

## 2018-07-01 RX ADMIN — ATORVASTATIN CALCIUM 40 MILLIGRAM(S): 80 TABLET, FILM COATED ORAL at 21:31

## 2018-07-01 RX ADMIN — CLOPIDOGREL BISULFATE 75 MILLIGRAM(S): 75 TABLET, FILM COATED ORAL at 12:01

## 2018-07-01 RX ADMIN — SENNA PLUS 2 TABLET(S): 8.6 TABLET ORAL at 21:31

## 2018-07-01 RX ADMIN — PANTOPRAZOLE SODIUM 40 MILLIGRAM(S): 20 TABLET, DELAYED RELEASE ORAL at 06:09

## 2018-07-01 RX ADMIN — POLYETHYLENE GLYCOL 3350 17 GRAM(S): 17 POWDER, FOR SOLUTION ORAL at 12:01

## 2018-07-01 RX ADMIN — Medication 100 MILLIGRAM(S): at 21:32

## 2018-07-01 RX ADMIN — Medication 100 MILLIGRAM(S): at 17:29

## 2018-07-01 RX ADMIN — MEMANTINE HYDROCHLORIDE 5 MILLIGRAM(S): 10 TABLET ORAL at 21:31

## 2018-07-01 RX ADMIN — Medication 50 MILLIGRAM(S): at 12:01

## 2018-07-01 RX ADMIN — CARBIDOPA AND LEVODOPA 1 TABLET(S): 25; 100 TABLET ORAL at 21:31

## 2018-07-01 RX ADMIN — CARBIDOPA AND LEVODOPA 1 TABLET(S): 25; 100 TABLET ORAL at 13:22

## 2018-07-01 RX ADMIN — CARBIDOPA AND LEVODOPA 1 TABLET(S): 25; 100 TABLET ORAL at 06:09

## 2018-07-01 NOTE — PROGRESS NOTE ADULT - PROBLEM SELECTOR PLAN 1
likely concern for aspiration pneumonia { as per pcp}  patient has no fevers and no leucocyte count   follow with procalcitonin   cxr : clear   follow with repeat  cxr in 48 hours to r/o  aspiration pneumonia   speech and swallow evaluation  -cont on Rocephin / metronidazole

## 2018-07-01 NOTE — CONSULT NOTE ADULT - SUBJECTIVE AND OBJECTIVE BOX
PULMONARY CONSULT NOTE      BIJAN GRECO  MRN-[id number]    Patient is a 67y old  Male who presents with a chief complaint of shortness of breath (30 Jun 2018 22:35)      History of Present Illness:  Reason for Admission: shortness of breath	  History of Present Illness: 	  66 y/o M pt with PMHx of Blindness of the R Eye, CVA (non-verbal at baseline), HTN, HLD, and Parkinson's Disease sent for HHA{ who is only with him for past few days and now currently  not at the bedside } for cough with sputum production  and associated with mild shortness of breath . AS PER the E.D charts patient HHA denies any nausea , vomiting , abdominal pain or any other acute complaints . patient was recently admitted to [place] for constipation  and discharged on bowel regimen .      HISTORY OF PRESENT ILLNESS: As above. Awake, alert, non verbal, lying in bed in NAD.    MEDICATIONS  (STANDING):  amLODIPine   Tablet 5 milliGRAM(s) Oral daily  artificial  tears Solution 1 Drop(s) Both EYES two times a day  aspirin  chewable 81 milliGRAM(s) Oral daily  atorvastatin 40 milliGRAM(s) Oral at bedtime  carbidopa/levodopa  25/100 1 Tablet(s) Oral three times a day  cefTRIAXone   IVPB 1 Gram(s) IV Intermittent every 24 hours  clopidogrel Tablet 75 milliGRAM(s) Oral daily  docusate sodium 100 milliGRAM(s) Oral two times a day  memantine 5 milliGRAM(s) Oral at bedtime  metroNIDAZOLE  IVPB 500 milliGRAM(s) IV Intermittent every 8 hours  pantoprazole    Tablet 40 milliGRAM(s) Oral before breakfast  polyethylene glycol 3350 17 Gram(s) Oral daily  senna 2 Tablet(s) Oral at bedtime  traZODone 50 milliGRAM(s) Oral daily      MEDICATIONS  (PRN):      Allergies    No Known Allergies    Intolerances        PAST MEDICAL & SURGICAL HISTORY:  HLD (hyperlipidemia)  HTN (hypertension)  Parkinson's disease  Blindness of right eye  CVA (cerebral vascular accident)  History of laminectomy      FAMILY HISTORY:  No pertinent family history in first degree relatives      SOCIAL HISTORY  Smoking History:     REVIEW OF SYSTEMS:    CONSTITUTIONAL:  No fevers, chills, sweats    HEENT:  Eyes:  No diplopia or blurred vision. ENT:  No earache, sore throat or runny nose.    CARDIOVASCULAR:  No pressure, squeezing, tightness, or heaviness about the chest; no palpitations.    RESPIRATORY:  Per HPI    GASTROINTESTINAL:  No abdominal pain, nausea, vomiting or diarrhea.    GENITOURINARY:  No dysuria, frequency or urgency.    NEUROLOGIC:  No paresthesias, fasciculations, seizures or weakness.    PSYCHIATRIC:  No disorder of thought or mood.    Vital Signs Last 24 Hrs  T(C): 36.1 (01 Jul 2018 05:37), Max: 36.9 (30 Jun 2018 16:49)  T(F): 97 (01 Jul 2018 05:37), Max: 98.4 (30 Jun 2018 16:49)  HR: 76 (01 Jul 2018 05:37) (72 - 110)  BP: 128/75 (01 Jul 2018 05:37) (111/71 - 145/85)  BP(mean): --  RR: 17 (01 Jul 2018 05:37) (17 - 20)  SpO2: 100% (01 Jul 2018 05:37) (96% - 100%)  I&O's Detail      PHYSICAL EXAMINATION:    GENERAL: The patient is a well-developed, well-nourished _____in no apparent distress.     HEENT: Head is normocephalic and atraumatic. Extraocular muscles are intact. Mucous membranes are moist.     NECK: Supple.     LUNGS: Clear to auscultation without wheezing, rales, or rhonchi. Respirations unlabored    HEART: Regular rate and rhythm without murmur.    ABDOMEN: Soft, nontender, and nondistended.  No hepatosplenomegaly is noted.    EXTREMITIES: Without any cyanosis, clubbing, rash, lesions or edema.    NEUROLOGIC: Grossly intact.      LABS:                        14.1   8.5   )-----------( 231      ( 30 Jun 2018 17:49 )             44.8     06-30    144  |  110<H>  |  13  ----------------------------<  84  3.9   |  26  |  0.83    Ca    8.9      30 Jun 2018 17:49  Mg     2.3     06-30    TPro  7.4  /  Alb  3.9  /  TBili  0.7  /  DBili  x   /  AST  11  /  ALT  14  /  AlkPhos  101  06-30    PT/INR - ( 30 Jun 2018 17:49 )   PT: 12.2 sec;   INR: 1.12 ratio         PTT - ( 30 Jun 2018 17:49 )  PTT:29.5 sec      CARDIAC MARKERS ( 01 Jul 2018 06:40 )  <0.015 ng/mL / x     / x     / x     / x      CARDIAC MARKERS ( 01 Jul 2018 00:17 )  <0.015 ng/mL / x     / x     / x     / x      CARDIAC MARKERS ( 30 Jun 2018 17:49 )  <0.015 ng/mL / x     / 28 U/L / x     / <1.0 ng/mL        Serum Pro-Brain Natriuretic Peptide: 74 pg/mL (06-30-18 @ 17:49)    Lactate, Blood: 1.0 mmol/L (06-30-18 @ 19:51)        MICROBIOLOGY:    RADIOLOGY & ADDITIONAL STUDIES:    CXR:  < from: Xray Chest 1 View AP/PA (06.30.18 @ 18:06) >  IMPRESSION: Slightly increasing interstitial markings left base. Other   findings stable as above.      < end of copied text >    Ct scan chest:    ekg;    echo:

## 2018-07-01 NOTE — PROGRESS NOTE ADULT - ASSESSMENT
68 y/o M pt with PMHx of Blindness of the R Eye, CVA (non-verbal at baseline), HTN, HLD, and Parkinson's Disease sent for HHA{ who is only with him for past few days and now currently  not at the bedside } for cough with sputum production  and associated with mild shortness of breath .    will admit the patient for   1]shortness of breath and concern for cough   2]parkinsons disease   3}cva   4}need for prophylactic measure

## 2018-07-01 NOTE — PROGRESS NOTE ADULT - PROBLEM SELECTOR PLAN 1
patient has no fevers and no leucocyte count   follow with procalcitonin   cxr : clear   follow with repeat  cxr in 48 hours to r/o  aspiration pneumonia   speech and swallow evaluation  -cont on Rocephin / metronidazole.

## 2018-07-01 NOTE — CONSULT NOTE ADULT - SUBJECTIVE AND OBJECTIVE BOX
66 y/o M pt with PMHx of Blindness of the R Eye, CVA (non-verbal at baseline), HTN, HLD, and Parkinson's Disease sent for HHA{ who is only with him for past few days and now currently  not at the bedside } for cough with sputum production  and associated with mild shortness of breath . AS PER the E.D charts patient HHA denies any nausea , vomiting , abdominal pain or any other acute complaints . patient was recently admitted to [place] for constipation  and discharged on bowel regimen .    Patient is a 67y old  Male who presents with a chief complaint of shortness of breath (30 Jun 2018 22:35)    REVIEW OF SYSTEMS: Total of twelve systems have been reviewed with patient and found to be negative unless mentioned in HPI          PAST MEDICAL & SURGICAL HISTORY:  HLD (hyperlipidemia)  HTN (hypertension)  Parkinson's disease  Blindness of right eye  CVA (cerebral vascular accident)  History of laminectomy        SOCIAL HISTORY  Alcohol: Does not drink  Tobacco: Does not smoke  Illicit substance use: None        FAMILY HISTORY: Non contributory to the present illness          ALLERGIES: NKDA      T(C): 36.8 (07-01-18 @ 14:43), Max: 36.8 (07-01-18 @ 14:43)  HR: 100 (07-01-18 @ 14:43) (72 - 100)  BP: 122/73 (07-01-18 @ 14:43) (111/71 - 145/85)  RR: 17 (07-01-18 @ 14:43) (17 - 20)  SpO2: 100% (07-01-18 @ 14:43) (98% - 100%)  Wt(kg): --  I&O's Summary      PHYSICAL EXAM:  GENERAL: NAD, well-groomed, well-developed  CHEST/LUNG: Clear to auscultate bilaterally  HEART: s1 and s2 present  ABDOMEN: Soft, Nontender, Nondistended  EXTREMITIES:  No pedal  edema  CNS: Awake and alert          LABS:                        14.1   8.5   )-----------( 231      ( 30 Jun 2018 17:49 )             44.8         06-30    144  |  110<H>  |  13  ----------------------------<  84  3.9   |  26  |  0.83    Ca    8.9      30 Jun 2018 17:49  Mg     2.3     06-30    TPro  7.4  /  Alb  3.9  /  TBili  0.7  /  DBili  x   /  AST  11  /  ALT  14  /  AlkPhos  101  06-30    PT/INR - ( 30 Jun 2018 17:49 )   PT: 12.2 sec;   INR: 1.12 ratio         PTT - ( 30 Jun 2018 17:49 )  PTT:29.5 sec    CAPILLARY BLOOD GLUCOSE        MEDICATIONS  (STANDING):  amLODIPine   Tablet 5 milliGRAM(s) Oral daily  artificial  tears Solution 1 Drop(s) Both EYES two times a day  aspirin  chewable 81 milliGRAM(s) Oral daily  atorvastatin 40 milliGRAM(s) Oral at bedtime  carbidopa/levodopa  25/100 1 Tablet(s) Oral three times a day  cefTRIAXone   IVPB 1 Gram(s) IV Intermittent every 24 hours  clopidogrel Tablet 75 milliGRAM(s) Oral daily  docusate sodium 100 milliGRAM(s) Oral two times a day  memantine 5 milliGRAM(s) Oral at bedtime  metroNIDAZOLE  IVPB 500 milliGRAM(s) IV Intermittent every 8 hours  pantoprazole    Tablet 40 milliGRAM(s) Oral before breakfast  polyethylene glycol 3350 17 Gram(s) Oral daily  senna 2 Tablet(s) Oral at bedtime  traZODone 50 milliGRAM(s) Oral daily    MEDICATIONS  (PRN):        RADIOLOGY & ADDITIONAL TESTS:    < from: Xray Chest 1 View AP/PA (06.30.18 @ 18:06) >  : Slightly increasing interstitial markings left base. Other   findings stable as above.        < end of copied text > A 68 yo Male with multiple co-morbidities including Blindness of the Right Eye,  Parkinson's Disease, who was recently discharged from [facility] after treated for constipation, and now sent in to the ER by HHA for evaluation of productive cough  and mild shortness of breath. He has no fever or chills, no nausea or  vomiting. The CXR shows  increasing interstitial markings left base. He has started on Ceftriaxone and Flagyl, and the ID consult requested to assist with further evaluation and antibiotic management.         REVIEW OF SYSTEMS: Total of twelve systems have been reviewed with patient and found to be negative unless mentioned in HPI          PAST MEDICAL & SURGICAL HISTORY:  HLD (hyperlipidemia)  HTN (hypertension)  Parkinson's disease  Blindness of right eye  CVA (cerebral vascular accident)  History of laminectomy        SOCIAL HISTORY  Alcohol: Does not drink  Tobacco: Does not smoke  Illicit substance use: None        FAMILY HISTORY: Non contributory to the present illness          ALLERGIES: NKDA      T(C): 36.8 (07-01-18 @ 14:43), Max: 36.8 (07-01-18 @ 14:43)  HR: 100 (07-01-18 @ 14:43) (72 - 100)  BP: 122/73 (07-01-18 @ 14:43) (111/71 - 145/85)  RR: 17 (07-01-18 @ 14:43) (17 - 20)  SpO2: 100% (07-01-18 @ 14:43) (98% - 100%)  Wt(kg): --  I&O's Summary        PHYSICAL EXAM:  GENERAL: NAD, well-groomed, well-developed  CHEST/LUNG: Clear to auscultate bilaterally  HEART: s1 and s2 present  ABDOMEN: Soft, Nontender, Nondistended  EXTREMITIES:  No pedal  edema  CNS: Awake and alert          LABS:                        14.1   8.5   )-----------( 231      ( 30 Jun 2018 17:49 )             44.8         06-30    144  |  110<H>  |  13  ----------------------------<  84  3.9   |  26  |  0.83    Ca    8.9      30 Jun 2018 17:49  Mg     2.3     06-30    TPro  7.4  /  Alb  3.9  /  TBili  0.7  /  DBili  x   /  AST  11  /  ALT  14  /  AlkPhos  101  06-30    PT/INR - ( 30 Jun 2018 17:49 )   PT: 12.2 sec;   INR: 1.12 ratio         PTT - ( 30 Jun 2018 17:49 )  PTT:29.5 sec      MEDICATIONS  (STANDING):  amLODIPine   Tablet 5 milliGRAM(s) Oral daily  artificial  tears Solution 1 Drop(s) Both EYES two times a day  aspirin  chewable 81 milliGRAM(s) Oral daily  atorvastatin 40 milliGRAM(s) Oral at bedtime  carbidopa/levodopa  25/100 1 Tablet(s) Oral three times a day  cefTRIAXone   IVPB 1 Gram(s) IV Intermittent every 24 hours  clopidogrel Tablet 75 milliGRAM(s) Oral daily  docusate sodium 100 milliGRAM(s) Oral two times a day  memantine 5 milliGRAM(s) Oral at bedtime  metroNIDAZOLE  IVPB 500 milliGRAM(s) IV Intermittent every 8 hours  pantoprazole    Tablet 40 milliGRAM(s) Oral before breakfast  polyethylene glycol 3350 17 Gram(s) Oral daily  senna 2 Tablet(s) Oral at bedtime  traZODone 50 milliGRAM(s) Oral daily    MEDICATIONS  (PRN):        RADIOLOGY & ADDITIONAL TESTS:    6/30/18: Xray Chest 1 View AP/PA (06.30.18 @ 18:06) : Slightly increasing interstitial markings left base.

## 2018-07-01 NOTE — PROGRESS NOTE ADULT - SUBJECTIVE AND OBJECTIVE BOX
PGY 1 Note discussed with supervising resident and primary attending    Patient is a 67y old  Male who presents with a chief complaint of shortness of breath (30 Jun 2018 22:35)      INTERVAL HPI/OVERNIGHT EVENTS: offers no new complaints; current symptoms resolving    MEDICATIONS  (STANDING):  amLODIPine   Tablet 5 milliGRAM(s) Oral daily  artificial  tears Solution 1 Drop(s) Both EYES two times a day  aspirin  chewable 81 milliGRAM(s) Oral daily  atorvastatin 40 milliGRAM(s) Oral at bedtime  carbidopa/levodopa  25/100 1 Tablet(s) Oral three times a day  cefTRIAXone   IVPB 1 Gram(s) IV Intermittent every 24 hours  clopidogrel Tablet 75 milliGRAM(s) Oral daily  docusate sodium 100 milliGRAM(s) Oral two times a day  memantine 5 milliGRAM(s) Oral at bedtime  metroNIDAZOLE  IVPB 500 milliGRAM(s) IV Intermittent every 8 hours  pantoprazole    Tablet 40 milliGRAM(s) Oral before breakfast  polyethylene glycol 3350 17 Gram(s) Oral daily  senna 2 Tablet(s) Oral at bedtime  traZODone 50 milliGRAM(s) Oral daily    MEDICATIONS  (PRN):      __________________________________________________  REVIEW OF SYSTEMS:    CONSTITUTIONAL: No fever,   EYES: no acute visual disturbances  NECK: No pain or stiffness  RESPIRATORY: No cough; No shortness of breath  CARDIOVASCULAR: No chest pain, no palpitations  GASTROINTESTINAL: No pain. No nausea or vomiting; No diarrhea   NEUROLOGICAL: No headache or numbness, no tremors  MUSCULOSKELETAL: No joint pain, no muscle pain  GENITOURINARY: no dysuria, no frequency, no hesitancy  PSYCHIATRY: no depression , no anxiety  ALL OTHER  ROS negative        Vital Signs Last 24 Hrs  T(C): 37 (01 Jul 2018 22:12), Max: 37 (01 Jul 2018 22:12)  T(F): 98.6 (01 Jul 2018 22:12), Max: 98.6 (01 Jul 2018 22:12)  HR: 76 (01 Jul 2018 22:12) (72 - 100)  BP: 114/64 (01 Jul 2018 22:12) (111/71 - 145/85)  BP(mean): --  RR: 17 (01 Jul 2018 22:12) (17 - 20)  SpO2: 99% (01 Jul 2018 22:12) (98% - 100%)    ________________________________________________  PHYSICAL EXAM:  GENERAL: NAD  HEENT: Normocephalic;  conjunctivae and sclerae clear; moist mucous membranes;   NECK : supple  CHEST/LUNG: Clear to auscultation bilaterally with good air entry   HEART: S1 S2  regular; no murmurs, gallops or rubs  ABDOMEN: Soft, Nontender, Nondistended; Bowel sounds present  EXTREMITIES: no cyanosis; no edema; no calf tenderness  SKIN: warm and dry; no rash  NERVOUS SYSTEM:  Awake but non verbal ; no new deficits    _________________________________________________  LABS:                        14.1   8.5   )-----------( 231      ( 30 Jun 2018 17:49 )             44.8     06-30    144  |  110<H>  |  13  ----------------------------<  84  3.9   |  26  |  0.83    Ca    8.9      30 Jun 2018 17:49  Mg     2.3     06-30    TPro  7.4  /  Alb  3.9  /  TBili  0.7  /  DBili  x   /  AST  11  /  ALT  14  /  AlkPhos  101  06-30    PT/INR - ( 30 Jun 2018 17:49 )   PT: 12.2 sec;   INR: 1.12 ratio         PTT - ( 30 Jun 2018 17:49 )  PTT:29.5 sec    CAPILLARY BLOOD GLUCOSE            RADIOLOGY & ADDITIONAL TESTS:    Imaging Personally Reviewed:  YES/NO    Consultant(s) Notes Reviewed:   YES/ No    Care Discussed with Consultants :     Plan of care was discussed with patient and /or primary care giver; all questions and concerns were addressed and care was aligned with patient's wishes.

## 2018-07-01 NOTE — PROGRESS NOTE ADULT - ASSESSMENT
66 y/o M pt with PMHx of Blindness of the R Eye, CVA (non-verbal at baseline), HTN, HLD, and Parkinson's Disease sent for HHA{ who is only with him for past few days and now currently  not at the bedside } for cough with sputum production  and associated with mild shortness of breath .    will admit the patient for   1]shortness of breath and concern for cough   2]parkinsons disease   3}cva   4}need for prophylactic measure

## 2018-07-01 NOTE — PROGRESS NOTE ADULT - SUBJECTIVE AND OBJECTIVE BOX
[id number]  67y[facility]        Patient is a 67y old  Male who presents with a chief complaint of shortness of breath (30 Jun 2018 22:35)      INTERVAL HPI/OVERNIGHT EVENTS:  Patient seen and examined at bedside. No notiations of chest pain, palpitation, SOB, nausea, vomiting, abdominal pain.    ALLERGIES  No Known Allergies      MEDICATIONS  amLODIPine   Tablet 5 milliGRAM(s) Oral daily  artificial  tears Solution 1 Drop(s) Both EYES two times a day  aspirin  chewable 81 milliGRAM(s) Oral daily  atorvastatin 40 milliGRAM(s) Oral at bedtime  carbidopa/levodopa  25/100 1 Tablet(s) Oral three times a day  cefTRIAXone   IVPB 1 Gram(s) IV Intermittent every 24 hours  clopidogrel Tablet 75 milliGRAM(s) Oral daily  docusate sodium 100 milliGRAM(s) Oral two times a day  memantine 5 milliGRAM(s) Oral at bedtime  metroNIDAZOLE  IVPB 500 milliGRAM(s) IV Intermittent every 8 hours  pantoprazole    Tablet 40 milliGRAM(s) Oral before breakfast  polyethylene glycol 3350 17 Gram(s) Oral daily  senna 2 Tablet(s) Oral at bedtime  traZODone 50 milliGRAM(s) Oral daily              REVIEW OF SYSTEMS:  CONSTITUTIONAL: No fever, weight loss, or fatigue  EYES: No eye pain, visual disturbances, or discharge  ENT:  No difficulty hearing, tinnitus, vertigo; No sinus or throat pain  NECK: No pain or stiffness  RESPIRATORY: No cough, wheezing, chills or hemoptysis; No Shortness of Breath  CARDIOVASCULAR: No chest pain, palpitations, passing out, dizziness, or leg swelling  GASTROINTESTINAL: No abdominal or epigastric pain. No nausea, vomiting, or hematemesis; No diarrhea or constipation. No melena or hematochezia.  GENITOURINARY: No dysuria, frequency, hematuria, or incontinence  NEUROLOGICAL: No headaches, memory loss, loss of strength, numbness, or tremors  SKIN: No itching, burning, rashes, or lesions   LYMPH Nodes: No enlarged glands  ENDOCRINE: No heat or cold intolerance; No hair loss  MUSCULOSKELETAL: No joint pain or swelling; No muscle, back, or extremity pain  PSYCHIATRIC: No depression, anxiety, mood swings, or difficulty sleeping  HEME/LYMPH: No easy bruising, or bleeding gums  ALLERGY AND IMMUNOLOGIC: No hives or eczema	    [ ] All others negative	  [ ] Unable to obtain      T(C): 36.8 (07-01-18 @ 14:43), Max: 36.9 (06-30-18 @ 16:49)  T(F): 98.3 (07-01-18 @ 14:43), Max: 98.4 (06-30-18 @ 16:49)  HR: 100 (07-01-18 @ 14:43) (72 - 110)  BP: 122/73 (07-01-18 @ 14:43) (111/71 - 145/85)  RR: 17 (07-01-18 @ 14:43) (17 - 20)  SpO2: 100% (07-01-18 @ 14:43) (96% - 100%)  Wt(kg): --  Height (cm): 172.72 (06-30 @ 16:49)  Weight (kg): 65.8 (06-30 @ 16:49)  BMI (kg/m2): 22.1 (06-30 @ 16:49)  BSA (m2): 1.78 (06-30 @ 16:49)  I&O's Summary        PHYSICAL EXAM:  A X O x  HEAD:  Atraumatic, Normocephalic  EYES: EOMI, PERRLA, conjunctiva and sclera clear  NECK: Supple, No JVD, Normal thyroid  Resp: CTAB, No crackles, wheezing,   CVS: Regular rate and rhythm; No discernable murmurs, rubs, or gallops  ABD: Soft, Nontender, Nondistended; Bowel sounds present  EXTREMITIES:  2+ Peripheral Pulses, No edema  LYMPH: No dicernable lymphadenopathy noted  GENERAL: NAD, well-groomed, well-developed      LABS:                        14.1   8.5   )-----------( 231      ( 30 Jun 2018 17:49 )             44.8     06-30    144  |  110<H>  |  13  ----------------------------<  84  3.9   |  26  |  0.83    Ca    8.9      30 Jun 2018 17:49  Mg     2.3     06-30    TPro  7.4  /  Alb  3.9  /  TBili  0.7  /  DBili  x   /  AST  11  /  ALT  14  /  AlkPhos  101  06-30    PT/INR - ( 30 Jun 2018 17:49 )   PT: 12.2 sec;   INR: 1.12 ratio         PTT - ( 30 Jun 2018 17:49 )  PTT:29.5 sec    CAPILLARY BLOOD GLUCOSE          Troponins:  ProBNP:  Lipid Profile:   HgA1c:  TSH:           RADIOLOGY & ADDITIONAL TESTS:    Imaging Personally Reviewed:  [ ] YES  [ ] NO      Consultant(s) Notes Reviewed:  [x ] YES  [ ] NO    Care Discussed with Consultants/Other Providers [ x] YES  [ ] NO          PAST MEDICAL & SURGICAL HISTORY:  HLD (hyperlipidemia)  HTN (hypertension)  Parkinson's disease  Blindness of right eye  CVA (cerebral vascular accident)  History of laminectomy        Pneumonitis due to inhalation of food or vomitus  No pertinent family history in first degree relatives  Handoff  MEWS Score  HLD (hyperlipidemia)  HTN (hypertension)  Parkinson's disease  Blindness of right eye  CVA (cerebral vascular accident)  HLD (hyperlipidemia)  HTN (hypertension)  Parkinson's disease  Aspiration pneumonia  HTN (hypertension)  Aspiration pneumonia  Need for prophylactic measure  CVA (cerebral vascular accident)  Parkinson's disease  Shortness of breath  History of laminectomy  A- SOB  17

## 2018-07-02 LAB
PROCALCITONIN SERPL-MCNC: 0.04 NG/ML — SIGNIFICANT CHANGE UP (ref 0.02–0.1)
RAPID RVP RESULT: SIGNIFICANT CHANGE UP

## 2018-07-02 RX ORDER — CEFEPIME 1 G/1
1000 INJECTION, POWDER, FOR SOLUTION INTRAMUSCULAR; INTRAVENOUS EVERY 12 HOURS
Qty: 0 | Refills: 0 | Status: DISCONTINUED | OUTPATIENT
Start: 2018-07-02 | End: 2018-07-03

## 2018-07-02 RX ORDER — SODIUM CHLORIDE 9 MG/ML
1000 INJECTION, SOLUTION INTRAVENOUS
Qty: 0 | Refills: 0 | Status: DISCONTINUED | OUTPATIENT
Start: 2018-07-02 | End: 2018-07-04

## 2018-07-02 RX ORDER — CEFEPIME 1 G/1
INJECTION, POWDER, FOR SOLUTION INTRAMUSCULAR; INTRAVENOUS
Qty: 0 | Refills: 0 | Status: DISCONTINUED | OUTPATIENT
Start: 2018-07-02 | End: 2018-07-03

## 2018-07-02 RX ORDER — CEFEPIME 1 G/1
1000 INJECTION, POWDER, FOR SOLUTION INTRAMUSCULAR; INTRAVENOUS ONCE
Qty: 0 | Refills: 0 | Status: COMPLETED | OUTPATIENT
Start: 2018-07-02 | End: 2018-07-02

## 2018-07-02 RX ADMIN — ATORVASTATIN CALCIUM 40 MILLIGRAM(S): 80 TABLET, FILM COATED ORAL at 23:00

## 2018-07-02 RX ADMIN — CARBIDOPA AND LEVODOPA 1 TABLET(S): 25; 100 TABLET ORAL at 23:00

## 2018-07-02 RX ADMIN — Medication 100 MILLIGRAM(S): at 06:08

## 2018-07-02 RX ADMIN — SENNA PLUS 2 TABLET(S): 8.6 TABLET ORAL at 23:00

## 2018-07-02 RX ADMIN — Medication 100 MILLIGRAM(S): at 13:21

## 2018-07-02 RX ADMIN — CARBIDOPA AND LEVODOPA 1 TABLET(S): 25; 100 TABLET ORAL at 13:21

## 2018-07-02 RX ADMIN — AMLODIPINE BESYLATE 5 MILLIGRAM(S): 2.5 TABLET ORAL at 06:08

## 2018-07-02 RX ADMIN — Medication 100 MILLIGRAM(S): at 06:07

## 2018-07-02 RX ADMIN — SODIUM CHLORIDE 75 MILLILITER(S): 9 INJECTION, SOLUTION INTRAVENOUS at 17:08

## 2018-07-02 RX ADMIN — CARBIDOPA AND LEVODOPA 1 TABLET(S): 25; 100 TABLET ORAL at 06:08

## 2018-07-02 RX ADMIN — CEFEPIME 100 MILLIGRAM(S): 1 INJECTION, POWDER, FOR SOLUTION INTRAMUSCULAR; INTRAVENOUS at 10:48

## 2018-07-02 RX ADMIN — Medication 100 MILLIGRAM(S): at 22:59

## 2018-07-02 RX ADMIN — Medication 1 DROP(S): at 17:08

## 2018-07-02 RX ADMIN — CEFEPIME 100 MILLIGRAM(S): 1 INJECTION, POWDER, FOR SOLUTION INTRAMUSCULAR; INTRAVENOUS at 17:08

## 2018-07-02 RX ADMIN — SODIUM CHLORIDE 75 MILLILITER(S): 9 INJECTION, SOLUTION INTRAVENOUS at 12:31

## 2018-07-02 RX ADMIN — MEMANTINE HYDROCHLORIDE 5 MILLIGRAM(S): 10 TABLET ORAL at 23:00

## 2018-07-02 RX ADMIN — PANTOPRAZOLE SODIUM 40 MILLIGRAM(S): 20 TABLET, DELAYED RELEASE ORAL at 06:07

## 2018-07-02 RX ADMIN — Medication 100 MILLIGRAM(S): at 17:08

## 2018-07-02 RX ADMIN — Medication 1 DROP(S): at 06:08

## 2018-07-02 NOTE — PROGRESS NOTE ADULT - PROBLEM SELECTOR PLAN 1
Panculture  antibiotics  follow up CXR  oxygen supp  Bronchodilators. Check pending cultures  antibiotics  follow up CXR  oxygen supp  Bronchodilators.

## 2018-07-02 NOTE — PROGRESS NOTE ADULT - SUBJECTIVE AND OBJECTIVE BOX
Patient is a 67y old  Male who presents with a chief complaint of shortness of breath (30 Jun 2018 22:35)    Awake , alert comfortable in bed in [facility].    INTERVAL HPI/OVERNIGHT EVENTS:      VITAL SIGNS:  T(F): 97.2 (07-02-18 @ 05:33)  HR: 57 (07-02-18 @ 05:33)  BP: 108/64 (07-02-18 @ 05:33)  RR: 17 (07-02-18 @ 05:33)  SpO2: 98% (07-02-18 @ 05:33)  Wt(kg): --  I&O's Detail          REVIEW OF SYSTEMS:    CONSTITUTIONAL:  No fevers, chills, sweats    HEENT:  Eyes:  No diplopia or blurred vision. ENT:  No earache, sore throat or runny nose.    CARDIOVASCULAR:  No pressure, squeezing, tightness, or heaviness about the chest; no palpitations.    RESPIRATORY:  Per HPI    GASTROINTESTINAL:  No abdominal pain, nausea, vomiting or diarrhea.    GENITOURINARY:  No dysuria, frequency or urgency.    NEUROLOGIC:  No paresthesias, fasciculations, seizures or weakness.    PSYCHIATRIC:  No disorder of thought or mood.      PHYSICAL EXAM:    Constitutional: Well developed and nourished  Eyes:Perrla  ENMT: normal  Neck:supple  Respiratory: good air entry  Cardiovascular: S1 S2 regular  Gastrointestinal: Soft, Non tender  Extremities: No edema  Vascular:normal  Neurological:Awake, alert,Ox3  Musculoskeletal:Normal      MEDICATIONS  (STANDING):  amLODIPine   Tablet 5 milliGRAM(s) Oral daily  artificial  tears Solution 1 Drop(s) Both EYES two times a day  aspirin  chewable 81 milliGRAM(s) Oral daily  atorvastatin 40 milliGRAM(s) Oral at bedtime  carbidopa/levodopa  25/100 1 Tablet(s) Oral three times a day  cefepime   IVPB      cefepime   IVPB 1000 milliGRAM(s) IV Intermittent every 12 hours  clopidogrel Tablet 75 milliGRAM(s) Oral daily  dextrose 5% + sodium chloride 0.9%. 1000 milliLiter(s) (75 mL/Hr) IV Continuous <Continuous>  docusate sodium 100 milliGRAM(s) Oral two times a day  memantine 5 milliGRAM(s) Oral at bedtime  metroNIDAZOLE  IVPB 500 milliGRAM(s) IV Intermittent every 8 hours  pantoprazole    Tablet 40 milliGRAM(s) Oral before breakfast  polyethylene glycol 3350 17 Gram(s) Oral daily  senna 2 Tablet(s) Oral at bedtime  traZODone 50 milliGRAM(s) Oral daily    MEDICATIONS  (PRN):      Allergies    No Known Allergies    Intolerances        LABS:                        14.1   8.5   )-----------( 231      ( 30 Jun 2018 17:49 )             44.8     06-30    144  |  110<H>  |  13  ----------------------------<  84  3.9   |  26  |  0.83    Ca    8.9      30 Jun 2018 17:49  Mg     2.3     06-30    TPro  7.4  /  Alb  3.9  /  TBili  0.7  /  DBili  x   /  AST  11  /  ALT  14  /  AlkPhos  101  06-30    PT/INR - ( 30 Jun 2018 17:49 )   PT: 12.2 sec;   INR: 1.12 ratio         PTT - ( 30 Jun 2018 17:49 )  PTT:29.5 sec      CARDIAC MARKERS ( 01 Jul 2018 06:40 )  <0.015 ng/mL / x     / x     / x     / x      CARDIAC MARKERS ( 01 Jul 2018 00:17 )  <0.015 ng/mL / x     / x     / x     / x      CARDIAC MARKERS ( 30 Jun 2018 17:49 )  <0.015 ng/mL / x     / 28 U/L / x     / <1.0 ng/mL      CAPILLARY BLOOD GLUCOSE        pro-bnp 74 06-30 @ 17:49     d-dimer --  06-30 @ 17:49      RADIOLOGY & ADDITIONAL TESTS:    CXR:  < from: Xray Chest 1 View AP/PA (06.30.18 @ 18:06) >  IMPRESSION: Slightly increasing interstitial markings left base. Other   findings stable as above.    < end of copied text >    Ct scan chest:  < from: CT Abdomen and Pelvis w/ IV Cont (06.05.18 @ 18:37) >  IMPRESSION: No acute pathology. Cholelithiasis  Large amount of stool throughout the colon may indicate constipation  Mild superior compression fracture of L1    < end of copied text >    ekg;    echo: Patient is a 67y old  Male who presents with a chief complaint of shortness of breath (30 Jun 2018 22:35)  Awake , alert, comfortable in bed in NAD. Aphasic in NAD.    INTERVAL HPI/OVERNIGHT EVENTS:      VITAL SIGNS:  T(F): 97.2 (07-02-18 @ 05:33)  HR: 57 (07-02-18 @ 05:33)  BP: 108/64 (07-02-18 @ 05:33)  RR: 17 (07-02-18 @ 05:33)  SpO2: 98% (07-02-18 @ 05:33)  Wt(kg): --  I&O's Detail          REVIEW OF SYSTEMS:    CONSTITUTIONAL:  No fevers, chills, sweats    HEENT:  Eyes:  No diplopia or blurred vision. ENT:  No earache, sore throat or runny nose.    CARDIOVASCULAR:  No pressure, squeezing, tightness, or heaviness about the chest; no palpitations.    RESPIRATORY:  Per HPI    GASTROINTESTINAL:  No abdominal pain, nausea, vomiting or diarrhea.    GENITOURINARY:  No dysuria, frequency or urgency.    NEUROLOGIC:  No paresthesias, fasciculations, seizures or weakness.    PSYCHIATRIC:  No disorder of thought or mood.      PHYSICAL EXAM:    Constitutional: Well developed and nourished  Eyes:Perrla  ENMT: normal  Neck:supple  Respiratory: good air entry  Cardiovascular: S1 S2 regular  Gastrointestinal: Soft, Non tender  Extremities: No edema  Vascular:normal  Neurological:Awake, alert,Ox3  Musculoskeletal:Normal      MEDICATIONS  (STANDING):  amLODIPine   Tablet 5 milliGRAM(s) Oral daily  artificial  tears Solution 1 Drop(s) Both EYES two times a day  aspirin  chewable 81 milliGRAM(s) Oral daily  atorvastatin 40 milliGRAM(s) Oral at bedtime  carbidopa/levodopa  25/100 1 Tablet(s) Oral three times a day  cefepime   IVPB      cefepime   IVPB 1000 milliGRAM(s) IV Intermittent every 12 hours  clopidogrel Tablet 75 milliGRAM(s) Oral daily  dextrose 5% + sodium chloride 0.9%. 1000 milliLiter(s) (75 mL/Hr) IV Continuous <Continuous>  docusate sodium 100 milliGRAM(s) Oral two times a day  memantine 5 milliGRAM(s) Oral at bedtime  metroNIDAZOLE  IVPB 500 milliGRAM(s) IV Intermittent every 8 hours  pantoprazole    Tablet 40 milliGRAM(s) Oral before breakfast  polyethylene glycol 3350 17 Gram(s) Oral daily  senna 2 Tablet(s) Oral at bedtime  traZODone 50 milliGRAM(s) Oral daily    MEDICATIONS  (PRN):      Allergies    No Known Allergies    Intolerances        LABS:                        14.1   8.5   )-----------( 231      ( 30 Jun 2018 17:49 )             44.8     06-30    144  |  110<H>  |  13  ----------------------------<  84  3.9   |  26  |  0.83    Ca    8.9      30 Jun 2018 17:49  Mg     2.3     06-30    TPro  7.4  /  Alb  3.9  /  TBili  0.7  /  DBili  x   /  AST  11  /  ALT  14  /  AlkPhos  101  06-30    PT/INR - ( 30 Jun 2018 17:49 )   PT: 12.2 sec;   INR: 1.12 ratio         PTT - ( 30 Jun 2018 17:49 )  PTT:29.5 sec      CARDIAC MARKERS ( 01 Jul 2018 06:40 )  <0.015 ng/mL / x     / x     / x     / x      CARDIAC MARKERS ( 01 Jul 2018 00:17 )  <0.015 ng/mL / x     / x     / x     / x      CARDIAC MARKERS ( 30 Jun 2018 17:49 )  <0.015 ng/mL / x     / 28 U/L / x     / <1.0 ng/mL      CAPILLARY BLOOD GLUCOSE        pro-bnp 74 06-30 @ 17:49     d-dimer --  06-30 @ 17:49      RADIOLOGY & ADDITIONAL TESTS:    CXR:  < from: Xray Chest 1 View AP/PA (06.30.18 @ 18:06) >  IMPRESSION: Slightly increasing interstitial markings left base. Other   findings stable as above.    < end of copied text >    Ct scan chest:  < from: CT Abdomen and Pelvis w/ IV Cont (06.05.18 @ 18:37) >  IMPRESSION: No acute pathology. Cholelithiasis  Large amount of stool throughout the colon may indicate constipation  Mild superior compression fracture of L1    < end of copied text >    ekg;    echo:

## 2018-07-02 NOTE — PROGRESS NOTE ADULT - ASSESSMENT
A 66 yo Male with multiple co-morbidities including Blindness of the Right Eye,  Parkinson's Disease, who was recently discharged from [facility] after treated for constipation, and now sent in to the ER by A for evaluation of productive cough  and mild shortness of breath. He has no fever or chills, no nausea or  vomiting. The CXR shows  increasing interstitial markings left base. He has started on Ceftriaxone and Flagyl, and the ID consult requested to assist with further evaluation and antibiotic management.     # Suspected Aspiration pneumonia    Would recommend:  1. Obtain Labs and  Procalcitonin level, if negative then D/c ABx  2. Aspiration precaution  3. Continue  Cefepime and flagyl until work up is done  4. Consider CT chest w/o contrast for further evaluation of pneumonia     d/w Nursing staff    will follow the patient with you A 66 yo Male with multiple co-morbidities including Blindness of the Right Eye,  Parkinson's Disease, who was recently discharged from [facility] after treated for constipation, and now sent in to the ER by A for evaluation of productive cough  and mild shortness of breath. He has no fever or chills, no nausea or  vomiting. The CXR shows  increasing interstitial markings left base. He has started on Ceftriaxone and Flagyl, and the ID consult requested to assist with further evaluation and antibiotic management.     # Suspected Aspiration pneumonia    Would recommend:  1. Obtain Labs and  Procalcitonin level, if negative then D/c ABx  2. Aspiration precaution  3. Continue  Cefepime and flagyl until work up is done    d/w Nursing staff    will follow the patient with you

## 2018-07-02 NOTE — PROGRESS NOTE ADULT - SUBJECTIVE AND OBJECTIVE BOX
PGY 1 Note discussed with supervising resident and primary attending    Patient is a 67y old  Male who presents with a chief complaint of shortness of breath (30 Jun 2018 22:35)      INTERVAL HPI/OVERNIGHT EVENTS: offers no new complaints; patient had cough on food intake    MEDICATIONS  (STANDING):  amLODIPine   Tablet 5 milliGRAM(s) Oral daily  artificial  tears Solution 1 Drop(s) Both EYES two times a day  aspirin  chewable 81 milliGRAM(s) Oral daily  atorvastatin 40 milliGRAM(s) Oral at bedtime  carbidopa/levodopa  25/100 1 Tablet(s) Oral three times a day  cefTRIAXone   IVPB 1 Gram(s) IV Intermittent every 24 hours  clopidogrel Tablet 75 milliGRAM(s) Oral daily  docusate sodium 100 milliGRAM(s) Oral two times a day  memantine 5 milliGRAM(s) Oral at bedtime  metroNIDAZOLE  IVPB 500 milliGRAM(s) IV Intermittent every 8 hours  pantoprazole    Tablet 40 milliGRAM(s) Oral before breakfast  polyethylene glycol 3350 17 Gram(s) Oral daily  senna 2 Tablet(s) Oral at bedtime  traZODone 50 milliGRAM(s) Oral daily    MEDICATIONS  (PRN):      __________________________________________________  REVIEW OF SYSTEMS:    CONSTITUTIONAL: No fever,   EYES: no acute visual disturbances  NECK: No pain or stiffness  RESPIRATORY: No cough; No shortness of breath  CARDIOVASCULAR: No chest pain, no palpitations  GASTROINTESTINAL: No pain. No nausea or vomiting; No diarrhea   NEUROLOGICAL: No headache or numbness, no tremors  MUSCULOSKELETAL: No joint pain, no muscle pain  GENITOURINARY: no dysuria, no frequency, no hesitancy  PSYCHIATRY: no depression , no anxiety  ALL OTHER  ROS negative        Vital Signs Last 24 Hrs  T(C): 37 (01 Jul 2018 22:12), Max: 37 (01 Jul 2018 22:12)  T(F): 98.6 (01 Jul 2018 22:12), Max: 98.6 (01 Jul 2018 22:12)  HR: 76 (01 Jul 2018 22:12) (72 - 100)  BP: 114/64 (01 Jul 2018 22:12) (111/71 - 145/85)  BP(mean): --  RR: 17 (01 Jul 2018 22:12) (17 - 20)  SpO2: 99% (01 Jul 2018 22:12) (98% - 100%)    ________________________________________________  PHYSICAL EXAM:  GENERAL: NAD  HEENT: Normocephalic;  conjunctivae and sclerae clear; moist mucous membranes;   NECK : supple  CHEST/LUNG: Clear to auscultation bilaterally with good air entry   HEART: S1 S2  regular; no murmurs, gallops or rubs  ABDOMEN: Soft, Nontender, Nondistended; Bowel sounds present  EXTREMITIES: no cyanosis; no edema; no calf tenderness  SKIN: warm and dry; no rash  NERVOUS SYSTEM:  Awake but non verbal ; no new deficits    _________________________________________________  LABS:                        14.1   8.5   )-----------( 231      ( 30 Jun 2018 17:49 )             44.8     06-30    144  |  110<H>  |  13  ----------------------------<  84  3.9   |  26  |  0.83    Ca    8.9      30 Jun 2018 17:49  Mg     2.3     06-30    TPro  7.4  /  Alb  3.9  /  TBili  0.7  /  DBili  x   /  AST  11  /  ALT  14  /  AlkPhos  101  06-30    PT/INR - ( 30 Jun 2018 17:49 )   PT: 12.2 sec;   INR: 1.12 ratio         PTT - ( 30 Jun 2018 17:49 )  PTT:29.5 sec    CAPILLARY BLOOD GLUCOSE            RADIOLOGY & ADDITIONAL TESTS:    Imaging Personally Reviewed:  YES/NO    Consultant(s) Notes Reviewed:   YES/ No    Care Discussed with Consultants :     Plan of care was discussed with patient and /or primary care giver; all questions and concerns were addressed and care was aligned with patient's wishes.

## 2018-07-02 NOTE — PROGRESS NOTE ADULT - PROBLEM SELECTOR PLAN 1
patient has no fevers and no leucocyte count   follow with procalcitonin   cxr : clear   follow with repeat  cxr in 48 hours to r/o  aspiration pneumonia   speech and swallow evaluation  -cont on cefepime/ metronidazole.

## 2018-07-02 NOTE — PROGRESS NOTE ADULT - ASSESSMENT
66 y/o M pt with PMHx of Blindness of the R Eye, CVA (non-verbal at baseline), HTN, HLD, and Parkinson's Disease sent for HHA{ who is only with him for past few days and now currently  not at the bedside } for cough with sputum production  and associated with mild shortness of breath .    will admit the patient for   1]shortness of breath and concern for cough   2]parkinsons disease   3}cva   4}need for prophylactic measure      patient had cough on food intake   swallow evaluation was done and NPO recommended except medications. meds are given in apple sauce.  PT evaluation ordered

## 2018-07-02 NOTE — PROGRESS NOTE ADULT - SUBJECTIVE AND OBJECTIVE BOX
Patient was seen and examined  Patient is a 67y old  Male who presents with a chief complaint of shortness of breath (30 Jun 2018 22:35)      INTERVAL HPI/OVERNIGHT EVENTS:  T(C): 36.2 (07-02-18 @ 05:33), Max: 37 (07-01-18 @ 22:12)  HR: 57 (07-02-18 @ 05:33) (57 - 100)  BP: 108/64 (07-02-18 @ 05:33) (108/64 - 122/73)  RR: 17 (07-02-18 @ 05:33) (17 - 17)  SpO2: 98% (07-02-18 @ 05:33) (98% - 100%)  Wt(kg): --  I&O's Summary      LABS:                        14.1   8.5   )-----------( 231      ( 30 Jun 2018 17:49 )             44.8     06-30    144  |  110<H>  |  13  ----------------------------<  84  3.9   |  26  |  0.83    Ca    8.9      30 Jun 2018 17:49  Mg     2.3     06-30    TPro  7.4  /  Alb  3.9  /  TBili  0.7  /  DBili  x   /  AST  11  /  ALT  14  /  AlkPhos  101  06-30    PT/INR - ( 30 Jun 2018 17:49 )   PT: 12.2 sec;   INR: 1.12 ratio         PTT - ( 30 Jun 2018 17:49 )  PTT:29.5 sec      MEDICATIONS  (STANDING):  amLODIPine   Tablet 5 milliGRAM(s) Oral daily  artificial  tears Solution 1 Drop(s) Both EYES two times a day  aspirin  chewable 81 milliGRAM(s) Oral daily  atorvastatin 40 milliGRAM(s) Oral at bedtime  carbidopa/levodopa  25/100 1 Tablet(s) Oral three times a day  cefepime   IVPB      cefepime   IVPB 1000 milliGRAM(s) IV Intermittent once  cefepime   IVPB 1000 milliGRAM(s) IV Intermittent every 12 hours  clopidogrel Tablet 75 milliGRAM(s) Oral daily  docusate sodium 100 milliGRAM(s) Oral two times a day  memantine 5 milliGRAM(s) Oral at bedtime  metroNIDAZOLE  IVPB 500 milliGRAM(s) IV Intermittent every 8 hours  pantoprazole    Tablet 40 milliGRAM(s) Oral before breakfast  polyethylene glycol 3350 17 Gram(s) Oral daily  senna 2 Tablet(s) Oral at bedtime  traZODone 50 milliGRAM(s) Oral daily    MEDICATIONS  (PRN):      RADIOLOGY & ADDITIONAL TESTS:    Imaging Personally Reviewed:  [ ] YES  [ ] NO        Consultant(s) Notes Reviewed:  [ x ] YES  [ ] NO    PHYSICAL EXAM:  GENERAL: NAD, well-groomed, well-developed  HEAD:  Atraumatic, Normocephalic  EYES: EOMI, PERRLA, conjunctiva and sclera clear  ENMT: No tonsillar erythema, exudates, or enlargement; Moist mucous membranes, Good dentition, No lesions  NECK: Supple, No JVD, Normal thyroid  NERVOUS SYSTEM:  awake and alert  CHEST/LUNG: bl rhonchi   HEART: Regular rate and rhythm; No murmurs, rubs, or gallops  ABDOMEN: Soft, Nontender, Nondistended; Bowel sounds present  EXTREMITIES:  2+ Peripheral Pulses, No clubbing, cyanosis, or edema  LYMPH: No lymphadenopathy noted  SKIN: No rashes or lesions    Care Discussed with Consultants/Other Providers [ x] YES  [ ] NO

## 2018-07-02 NOTE — PROGRESS NOTE ADULT - SUBJECTIVE AND OBJECTIVE BOX
Patient is seen and examined at the bed side, is afebrile.      REVIEW OF SYSTEMS: All other review systems are negative      ALLERGIES: NKDA      Vital Signs Last 24 Hrs  T(C): 36.2 (02 Jul 2018 05:33), Max: 37 (01 Jul 2018 22:12)  T(F): 97.2 (02 Jul 2018 05:33), Max: 98.6 (01 Jul 2018 22:12)  HR: 57 (02 Jul 2018 05:33) (57 - 76)  BP: 108/64 (02 Jul 2018 05:33) (108/64 - 114/64)  BP(mean): --  RR: 17 (02 Jul 2018 05:33) (17 - 17)  SpO2: 98% (02 Jul 2018 05:33) (98% - 99%)      PHYSICAL EXAM:  GENERAL: Not in distress  CHEST/LUNG: Air entry  bilaterally  HEART: s1 and s2 present  ABDOMEN: Nontender, Nondistended  EXTREMITIES:  No pedal  edema  CNS: Awake and alert          LABS: No new Labs                          14.1   8.5   )-----------( 231      ( 30 Jun 2018 17:49 )             44.8         06-30    144  |  110<H>  |  13  ----------------------------<  84  3.9   |  26  |  0.83    Ca    8.9      30 Jun 2018 17:49  Mg     2.3     06-30    TPro  7.4  /  Alb  3.9  /  TBili  0.7  /  DBili  x   /  AST  11  /  ALT  14  /  AlkPhos  101  06-30    PT/INR - ( 30 Jun 2018 17:49 )   PT: 12.2 sec;   INR: 1.12 ratio         PTT - ( 30 Jun 2018 17:49 )  PTT:29.5 sec        MEDICATIONS  (STANDING):  amLODIPine   Tablet 5 milliGRAM(s) Oral daily  artificial  tears Solution 1 Drop(s) Both EYES two times a day  aspirin  chewable 81 milliGRAM(s) Oral daily  atorvastatin 40 milliGRAM(s) Oral at bedtime  carbidopa/levodopa  25/100 1 Tablet(s) Oral three times a day  cefepime   IVPB      cefepime   IVPB 1000 milliGRAM(s) IV Intermittent every 12 hours  clopidogrel Tablet 75 milliGRAM(s) Oral daily  dextrose 5% + sodium chloride 0.9%. 1000 milliLiter(s) (75 mL/Hr) IV Continuous <Continuous>  docusate sodium 100 milliGRAM(s) Oral two times a day  memantine 5 milliGRAM(s) Oral at bedtime  metroNIDAZOLE  IVPB 500 milliGRAM(s) IV Intermittent every 8 hours  pantoprazole    Tablet 40 milliGRAM(s) Oral before breakfast  polyethylene glycol 3350 17 Gram(s) Oral daily  senna 2 Tablet(s) Oral at bedtime  traZODone 50 milliGRAM(s) Oral daily    MEDICATIONS  (PRN):        RADIOLOGY & ADDITIONAL TESTS:      6/30/18: Xray Chest 1 View AP/PA (06.30.18 @ 18:06) : Slightly increasing interstitial markings left base.       MICROBIOLOGY DATA:    Rapid Respiratory Viral Panel (07.01.18 @ 22:02)    Rapid RVP Result: NotDetec: The FilmArray RVP Rapid uses polymerase chain reaction (PCR) and melt  curve analysis to screen for adenovirus; coronavirus HKU1, NL63, 229E,  OC43; human metapneumovirus (hMPV); human enterovirus/rhinovirus  (Entero/RV); influenza A; influenza A/H1;influenza A/H3; influenza  A/H1-2009; influenza B; parainfluenza viruses 1, 2, 3, 4; respiratory  syncytial virus; Bordetella pertussis; Mycoplasma pneumoniae; and  Chlamydophila pneumoniae.    Culture - Blood (07.01.18 @ 00:12)    Specimen Source: .Blood Blood    Culture Results:   No growth to date.    Culture - Blood (07.01.18 @ 00:12)    Specimen Source: .Blood Blood    Culture Results:   No growth to date. Patient is seen and examined at the bed side, is afebrile. He is feeling little better. The Blood cultures have no growth  to date.       REVIEW OF SYSTEMS: All other review systems are negative      ALLERGIES: NKDA      Vital Signs Last 24 Hrs  T(C): 36.2 (02 Jul 2018 05:33), Max: 37 (01 Jul 2018 22:12)  T(F): 97.2 (02 Jul 2018 05:33), Max: 98.6 (01 Jul 2018 22:12)  HR: 57 (02 Jul 2018 05:33) (57 - 76)  BP: 108/64 (02 Jul 2018 05:33) (108/64 - 114/64)  BP(mean): --  RR: 17 (02 Jul 2018 05:33) (17 - 17)  SpO2: 98% (02 Jul 2018 05:33) (98% - 99%)      PHYSICAL EXAM:  GENERAL: Not in distress  CHEST/LUNG: Air entry  bilaterally  HEART: s1 and s2 present  ABDOMEN: Nontender, Nondistended  EXTREMITIES:  No pedal  edema  CNS: Awake and alert          LABS: No new Labs                          14.1   8.5   )-----------( 231      ( 30 Jun 2018 17:49 )             44.8         06-30    144  |  110<H>  |  13  ----------------------------<  84  3.9   |  26  |  0.83    Ca    8.9      30 Jun 2018 17:49  Mg     2.3     06-30    TPro  7.4  /  Alb  3.9  /  TBili  0.7  /  DBili  x   /  AST  11  /  ALT  14  /  AlkPhos  101  06-30    PT/INR - ( 30 Jun 2018 17:49 )   PT: 12.2 sec;   INR: 1.12 ratio         PTT - ( 30 Jun 2018 17:49 )  PTT:29.5 sec        MEDICATIONS  (STANDING):  amLODIPine   Tablet 5 milliGRAM(s) Oral daily  artificial  tears Solution 1 Drop(s) Both EYES two times a day  aspirin  chewable 81 milliGRAM(s) Oral daily  atorvastatin 40 milliGRAM(s) Oral at bedtime  carbidopa/levodopa  25/100 1 Tablet(s) Oral three times a day  cefepime   IVPB      cefepime   IVPB 1000 milliGRAM(s) IV Intermittent every 12 hours  clopidogrel Tablet 75 milliGRAM(s) Oral daily  dextrose 5% + sodium chloride 0.9%. 1000 milliLiter(s) (75 mL/Hr) IV Continuous <Continuous>  docusate sodium 100 milliGRAM(s) Oral two times a day  memantine 5 milliGRAM(s) Oral at bedtime  metroNIDAZOLE  IVPB 500 milliGRAM(s) IV Intermittent every 8 hours  pantoprazole    Tablet 40 milliGRAM(s) Oral before breakfast  polyethylene glycol 3350 17 Gram(s) Oral daily  senna 2 Tablet(s) Oral at bedtime  traZODone 50 milliGRAM(s) Oral daily    MEDICATIONS  (PRN):        RADIOLOGY & ADDITIONAL TESTS:      6/30/18: Xray Chest 1 View AP/PA (06.30.18 @ 18:06) : Slightly increasing interstitial markings left base.       MICROBIOLOGY DATA:      Rapid Respiratory Viral Panel (07.01.18 @ 22:02)    Rapid RVP Result: NotDete: The FilmArray RVP Rapid uses polymerase chain reaction (PCR) and melt  curve analysis to screen for adenovirus; coronavirus HKU1, NL63, 229E,  OC43; human metapneumovirus (hMPV); human enterovirus/rhinovirus  (Entero/RV); influenza A; influenza A/H1;influenza A/H3; influenza  A/H1-2009; influenza B; parainfluenza viruses 1, 2, 3, 4; respiratory  syncytial virus; Bordetella pertussis; Mycoplasma pneumoniae; and  Chlamydophila pneumoniae.    Culture - Blood (07.01.18 @ 00:12)    Specimen Source: .Blood Blood    Culture Results:   No growth to date.    Culture - Blood (07.01.18 @ 00:12)    Specimen Source: .Blood Blood    Culture Results:   No growth to date.

## 2018-07-02 NOTE — SWALLOW BEDSIDE ASSESSMENT ADULT - PHARYNGEAL PHASE
Delayed pharyngeal swallow/Wet vocal quality post oral intake Wet vocal quality post oral intake/Delayed pharyngeal swallow

## 2018-07-03 LAB
ANION GAP SERPL CALC-SCNC: 8 MMOL/L — SIGNIFICANT CHANGE UP (ref 5–17)
BUN SERPL-MCNC: 14 MG/DL — SIGNIFICANT CHANGE UP (ref 7–18)
CALCIUM SERPL-MCNC: 8.7 MG/DL — SIGNIFICANT CHANGE UP (ref 8.4–10.5)
CHLORIDE SERPL-SCNC: 113 MMOL/L — HIGH (ref 96–108)
CO2 SERPL-SCNC: 26 MMOL/L — SIGNIFICANT CHANGE UP (ref 22–31)
CREAT SERPL-MCNC: 0.7 MG/DL — SIGNIFICANT CHANGE UP (ref 0.5–1.3)
GLUCOSE SERPL-MCNC: 89 MG/DL — SIGNIFICANT CHANGE UP (ref 70–99)
HCT VFR BLD CALC: 39.6 % — SIGNIFICANT CHANGE UP (ref 39–50)
HGB BLD-MCNC: 12.7 G/DL — LOW (ref 13–17)
MAGNESIUM SERPL-MCNC: 2.4 MG/DL — SIGNIFICANT CHANGE UP (ref 1.6–2.6)
MCHC RBC-ENTMCNC: 29.7 PG — SIGNIFICANT CHANGE UP (ref 27–34)
MCHC RBC-ENTMCNC: 32.1 GM/DL — SIGNIFICANT CHANGE UP (ref 32–36)
MCV RBC AUTO: 92.4 FL — SIGNIFICANT CHANGE UP (ref 80–100)
PHOSPHATE SERPL-MCNC: 2.8 MG/DL — SIGNIFICANT CHANGE UP (ref 2.5–4.5)
PLATELET # BLD AUTO: 191 K/UL — SIGNIFICANT CHANGE UP (ref 150–400)
POTASSIUM SERPL-MCNC: 3.6 MMOL/L — SIGNIFICANT CHANGE UP (ref 3.5–5.3)
POTASSIUM SERPL-SCNC: 3.6 MMOL/L — SIGNIFICANT CHANGE UP (ref 3.5–5.3)
RBC # BLD: 4.28 M/UL — SIGNIFICANT CHANGE UP (ref 4.2–5.8)
RBC # FLD: 13 % — SIGNIFICANT CHANGE UP (ref 10.3–14.5)
SODIUM SERPL-SCNC: 147 MMOL/L — HIGH (ref 135–145)
WBC # BLD: 6.4 K/UL — SIGNIFICANT CHANGE UP (ref 3.8–10.5)
WBC # FLD AUTO: 6.4 K/UL — SIGNIFICANT CHANGE UP (ref 3.8–10.5)

## 2018-07-03 PROCEDURE — 99223 1ST HOSP IP/OBS HIGH 75: CPT

## 2018-07-03 RX ORDER — CARBIDOPA AND LEVODOPA 25; 100 MG/1; MG/1
1 TABLET ORAL
Qty: 0 | Refills: 0 | Status: DISCONTINUED | OUTPATIENT
Start: 2018-07-03 | End: 2018-07-10

## 2018-07-03 RX ORDER — CARBIDOPA AND LEVODOPA 25; 100 MG/1; MG/1
1.5 TABLET ORAL
Qty: 0 | Refills: 0 | Status: DISCONTINUED | OUTPATIENT
Start: 2018-07-03 | End: 2018-07-10

## 2018-07-03 RX ORDER — CARBIDOPA AND LEVODOPA 25; 100 MG/1; MG/1
1 TABLET ORAL
Qty: 0 | Refills: 0 | Status: DISCONTINUED | OUTPATIENT
Start: 2018-07-03 | End: 2018-07-03

## 2018-07-03 RX ADMIN — POLYETHYLENE GLYCOL 3350 17 GRAM(S): 17 POWDER, FOR SOLUTION ORAL at 11:59

## 2018-07-03 RX ADMIN — CARBIDOPA AND LEVODOPA 1 TABLET(S): 25; 100 TABLET ORAL at 11:59

## 2018-07-03 RX ADMIN — Medication 50 MILLIGRAM(S): at 11:59

## 2018-07-03 RX ADMIN — CARBIDOPA AND LEVODOPA 1.5 TABLET(S): 25; 100 TABLET ORAL at 16:34

## 2018-07-03 RX ADMIN — Medication 100 MILLIGRAM(S): at 05:58

## 2018-07-03 RX ADMIN — CEFEPIME 100 MILLIGRAM(S): 1 INJECTION, POWDER, FOR SOLUTION INTRAMUSCULAR; INTRAVENOUS at 05:58

## 2018-07-03 RX ADMIN — MEMANTINE HYDROCHLORIDE 5 MILLIGRAM(S): 10 TABLET ORAL at 22:09

## 2018-07-03 RX ADMIN — Medication 1 DROP(S): at 05:58

## 2018-07-03 RX ADMIN — ATORVASTATIN CALCIUM 40 MILLIGRAM(S): 80 TABLET, FILM COATED ORAL at 22:09

## 2018-07-03 RX ADMIN — Medication 1 DROP(S): at 17:43

## 2018-07-03 RX ADMIN — Medication 81 MILLIGRAM(S): at 11:59

## 2018-07-03 RX ADMIN — SENNA PLUS 2 TABLET(S): 8.6 TABLET ORAL at 22:09

## 2018-07-03 RX ADMIN — AMLODIPINE BESYLATE 5 MILLIGRAM(S): 2.5 TABLET ORAL at 05:58

## 2018-07-03 RX ADMIN — PANTOPRAZOLE SODIUM 40 MILLIGRAM(S): 20 TABLET, DELAYED RELEASE ORAL at 06:09

## 2018-07-03 RX ADMIN — CARBIDOPA AND LEVODOPA 1 TABLET(S): 25; 100 TABLET ORAL at 05:57

## 2018-07-03 RX ADMIN — CARBIDOPA AND LEVODOPA 1 TABLET(S): 25; 100 TABLET ORAL at 18:50

## 2018-07-03 RX ADMIN — CLOPIDOGREL BISULFATE 75 MILLIGRAM(S): 75 TABLET, FILM COATED ORAL at 11:59

## 2018-07-03 RX ADMIN — SODIUM CHLORIDE 75 MILLILITER(S): 9 INJECTION, SOLUTION INTRAVENOUS at 08:43

## 2018-07-03 NOTE — PHYSICAL THERAPY INITIAL EVALUATION ADULT - IMPAIRMENTS CONTRIBUTING TO GAIT DEVIATIONS, PT EVAL
impaired motor control/impaired balance/decreased flexibility/abnormal muscle tone/decreased ROM/impaired postural control/decreased strength

## 2018-07-03 NOTE — PHYSICAL THERAPY INITIAL EVALUATION ADULT - GENERAL OBSERVATIONS, REHAB EVAL
Consult received, chart reviewed. Patient received supine in bed, NAD. Patient agreed to EVALUATION from Physical Therapist. Nonverbal at baseline but consistent and effective in use of hand squeezes/ small head nods

## 2018-07-03 NOTE — PHYSICAL THERAPY INITIAL EVALUATION ADULT - IMPAIRED TRANSFERS: SIT/STAND, REHAB EVAL
abnormal muscle tone/narrow base of support/impaired postural control/impaired balance/impaired motor control/decreased strength/significant increased time to task

## 2018-07-03 NOTE — SWALLOW BEDSIDE ASSESSMENT ADULT - SLP PERTINENT HISTORY OF CURRENT PROBLEM
66 y/o M pt with PMHx of Blindness of the R Eye, CVA (non-verbal at baseline), HTN, HLD, and Parkinson's Disease admitted on 6/30/18 for cough with sputum production and associated with mild shortness of breath, likely concern for aspiration pneumonia.
68 y/o M pt with PMHx of Blindness of the R Eye, CVA (non-verbal at baseline), HTN, HLD, and Parkinson's Disease admitted on 6/30/18 for cough with sputum production and associated with mild shortness of breath, likely concern for aspiration pneumonia.

## 2018-07-03 NOTE — PHYSICAL THERAPY INITIAL EVALUATION ADULT - CRITERIA FOR SKILLED THERAPEUTIC INTERVENTIONS
rehab potential/anticipated discharge recommendation/impairments found/therapy frequency/functional limitations in following categories/risk reduction/prevention/predicted duration of therapy intervention

## 2018-07-03 NOTE — PROGRESS NOTE ADULT - SUBJECTIVE AND OBJECTIVE BOX
Patient is seen and examined at the bed side, is afebrile. The procalcitonin level is low, 0.04.      REVIEW OF SYSTEMS: All other review systems are negative      ALLERGIES: NKDA      Vital Signs Last 24 Hrs  T(C): 36.6 (03 Jul 2018 14:57), Max: 36.6 (03 Jul 2018 14:57)  T(F): 97.9 (03 Jul 2018 14:57), Max: 97.9 (03 Jul 2018 14:57)  HR: 65 (03 Jul 2018 14:57) (64 - 85)  BP: 124/59 (03 Jul 2018 14:57) (124/59 - 169/85)  BP(mean): --  RR: 18 (03 Jul 2018 14:57) (18 - 18)  SpO2: 100% (03 Jul 2018 14:57) (97% - 100%)        PHYSICAL EXAM:  GENERAL: Not in distress  CHEST/LUNG: Air entry  bilaterally  HEART: s1 and s2 present  ABDOMEN: Nontender, Nondistended  EXTREMITIES:  No pedal  edema  CNS: Awake and alert          LABS:                           12.7   6.4   )-----------( 191      ( 03 Jul 2018 06:17 )             39.6                           14.1   8.5   )-----------( 231      ( 30 Jun 2018 17:49 )             44.8       07-03    147<H>  |  113<H>  |  14  ----------------------------<  89  3.6   |  26  |  0.70    Ca    8.7      03 Jul 2018 06:17  Phos  2.8     07-03  Mg     2.4     07-03            MEDICATIONS  (STANDING):  amLODIPine   Tablet 5 milliGRAM(s) Oral daily  artificial  tears Solution 1 Drop(s) Both EYES two times a day  aspirin  chewable 81 milliGRAM(s) Oral daily  atorvastatin 40 milliGRAM(s) Oral at bedtime  carbidopa/levodopa  25/100 1 Tablet(s) Oral <User Schedule>  carbidopa/levodopa  25/100 1.5 Tablet(s) Oral <User Schedule>  clopidogrel Tablet 75 milliGRAM(s) Oral daily  dextrose 5% + sodium chloride 0.9%. 1000 milliLiter(s) (75 mL/Hr) IV Continuous <Continuous>  docusate sodium 100 milliGRAM(s) Oral two times a day  memantine 5 milliGRAM(s) Oral at bedtime  pantoprazole    Tablet 40 milliGRAM(s) Oral before breakfast  polyethylene glycol 3350 17 Gram(s) Oral daily  senna 2 Tablet(s) Oral at bedtime  traZODone 50 milliGRAM(s) Oral daily    MEDICATIONS  (PRN):        RADIOLOGY & ADDITIONAL TESTS:      6/30/18: Xray Chest 1 View AP/PA (06.30.18 @ 18:06) : Slightly increasing interstitial markings left base.       MICROBIOLOGY DATA:      Rapid Respiratory Viral Panel (07.01.18 @ 22:02)    Rapid RVP Result: NotDete: The FilmArray RVP Rapid uses polymerase chain reaction (PCR) and melt  curve analysis to screen for adenovirus; coronavirus HKU1, NL63, 229E,  OC43; human metapneumovirus (hMPV); human enterovirus/rhinovirus  (Entero/RV); influenza A; influenza A/H1;influenza A/H3; influenza  A/H1-2009; influenza B; parainfluenza viruses 1, 2, 3, 4; respiratory  syncytial virus; Bordetella pertussis; Mycoplasma pneumoniae; and  Chlamydophila pneumoniae.    Culture - Blood (07.01.18 @ 00:12)    Specimen Source: .Blood Blood    Culture Results:   No growth to date.    Culture - Blood (07.01.18 @ 00:12)    Specimen Source: .Blood Blood    Culture Results:   No growth to date.

## 2018-07-03 NOTE — PROGRESS NOTE ADULT - ASSESSMENT
A 68 yo Male with multiple co-morbidities including Blindness of the Right Eye,  Parkinson's Disease, who was recently discharged from [facility] after treated for constipation, and now sent in to the ER by A for evaluation of productive cough  and mild shortness of breath. He has no fever or chills, no nausea or  vomiting. The CXR shows  increasing interstitial markings left base. He has started on Ceftriaxone and Flagyl, and the ID consult requested to assist with further evaluation and antibiotic management.     # Suspected Aspiration pneumonia    Would recommend:  1. Discontinue All antibiotics since Procalcitonin level is low and therefore less likely pneumonia  2. Aspiration precaution  3. OOb to chair    d/w Nursing staff    will follow the patient with you

## 2018-07-03 NOTE — PROGRESS NOTE ADULT - ASSESSMENT
66 y/o M pt with PMHx of Blindness of the R Eye, CVA (non-verbal at baseline), HTN, HLD, and Parkinson's Disease sent for HHA{ who is only with him for past few days and now currently  not at the bedside } for cough with sputum production  and associated with mild shortness of breath .    will admit the patient for   1]shortness of breath and concern for cough   2]parkinsons disease   3}cva   4}need for prophylactic measure      patient had cough on food intake   swallow evaluation was done and NPO recommended except medications. meds are given in apple sauce.  PT evaluation ordered   neurology consultation was asked for optimising patients parkinson disease medications to improve functionality .Dr haro saw the patient and optimised patients parkinson medications   -swallow re-evaluation was done and patients diet was changed to pureed and honey thick liquids by spoon

## 2018-07-03 NOTE — PHYSICAL THERAPY INITIAL EVALUATION ADULT - IMPAIRMENTS CONTRIBUTING IMPAIRED BED MOBILITY, REHAB EVAL
significant increased time to task/impaired balance/abnormal muscle tone/decreased strength/impaired postural control

## 2018-07-03 NOTE — CONSULT NOTE ADULT - ATTENDING COMMENTS
67-year-old gentleman presented with symptoms of aspiration pneumonia. He has long standing progressive parkinson's disease and old left brain stroke. His parkinson's disease has been poorly controlled either due to not getting proper dose or the frequency or progression. He needs a PEG tube for his medications. Please see above for adjustment of his sinemet dosage.

## 2018-07-03 NOTE — CONSULT NOTE ADULT - SUBJECTIVE AND OBJECTIVE BOX
[  ] STAT REQUEST              [ X ]ROUTINE REQUEST        Patient is a 67y old  Male with poor po intake associated with failure to thrive. GI consulted to tyra.      HPI:  67 year old male with multiple medical problems including Blindness of the R Eye now with poor po intake associated with failure to thrive and weight loss. GI consulted to evaluate.,              PAIN MANAGEMENT:  Pain Scale:                 0/10  Pain Location:      Prior Colonoscopy:  Unknown    PAST MEDICAL HISTORY  HLD (hyperlipidemia)  HTN (hypertension)  Parkinson's disease  Blindness of right eye  CVA (cerebral vascular accident)      PAST SURGICAL HISTORY  History of laminectomy      Allergies    No Known Allergies          MEDICATIONS  (STANDING):  amLODIPine   Tablet 5 milliGRAM(s) Oral daily  artificial  tears Solution 1 Drop(s) Both EYES two times a day  aspirin  chewable 81 milliGRAM(s) Oral daily  atorvastatin 40 milliGRAM(s) Oral at bedtime  carbidopa/levodopa  25/100 1 Tablet(s) Oral <User Schedule>  carbidopa/levodopa  25/100 1.5 Tablet(s) Oral <User Schedule>  clopidogrel Tablet 75 milliGRAM(s) Oral daily  dextrose 5% + sodium chloride 0.9%. 1000 milliLiter(s) (75 mL/Hr) IV Continuous <Continuous>  docusate sodium 100 milliGRAM(s) Oral two times a day  memantine 5 milliGRAM(s) Oral at bedtime  pantoprazole    Tablet 40 milliGRAM(s) Oral before breakfast  polyethylene glycol 3350 17 Gram(s) Oral daily  senna 2 Tablet(s) Oral at bedtime  traZODone 50 milliGRAM(s) Oral daily    MEDICATIONS  (PRN):      SOCIAL HISTORY  Advanced Directives:       [ X ] Full Code       [  ] DNR  Marital Status:         [  ] M      [ X ] S      [  ] D       [  ] W  Children:       [ X ] Yes      [  ] No  Occupation:        [  ] Employed       [ X ] Unemployed       [  ] Retired  Diet:       [ X ] Regular       [  ] PEG feeding          [  ] NG tube feeding  Drug Use:           [ X ] Patient denied          [  ] Yes  Alcohol:           [X  ] No             [  ] Yes (socially)         [  ] Yes (chronic)  Tobacco:           [  ] Yes           [ X ] No    FAMILY HISTORY  [ x ] Heart Disease            [  ] Diabetes             [  ] HTN             [  ] Colon Cancer             [  ] Stomach Cancer              [  ] Pancreatic Cancer        VITAL SIGNS   Vital Signs Last 24 Hrs  T(C): 36.6 (03 Jul 2018 14:57), Max: 36.6 (03 Jul 2018 14:57)  T(F): 97.9 (03 Jul 2018 14:57), Max: 97.9 (03 Jul 2018 14:57)  HR: 65 (03 Jul 2018 14:57) (64 - 85)  BP: 124/59 (03 Jul 2018 14:57) (124/59 - 169/85)   RR: 18 (03 Jul 2018 14:57) (18 - 18)  SpO2: 100% (03 Jul 2018 14:57) (97% - 100%)            CBC Full  -  ( 03 Jul 2018 06:17 )  WBC Count : 6.4 K/uL  Hemoglobin : 12.7 g/dL  Hematocrit : 39.6 %  Platelet Count - Automated : 191 K/uL  Mean Cell Volume : 92.4 fl  Mean Cell Hemoglobin : 29.7 pg  Mean Cell Hemoglobin Concentration : 32.1 gm/dL  A   147<H>  |  113<H>  |  14  ----------------------------<  89  3.6   |  26  |  0.70    Ca    8.7      03 Jul 2018 06:17  Phos  2.8     07-03  Mg     2.4     07-03    Urinalysis (05.18.16 @ 03:10)    pH Urine: 5.0    Glucose Qualitative, Urine: Negative    Blood, Urine: Negative    Color: Yellow    Urine Appearance: Clear    Bilirubin: Negative    Ketone - Urine: Negative    Specific Gravity: 1.025    Protein, Urine: Negative    Urobilinogen: Negative    Nitrite: Negative    Leukocyte Esterase Concentration: Negative    ECG  Ventricular Rate 102 BPM    Atrial Rate 102 BPM    P-R Interval 162 ms    QRS Duration 78 ms     ms    QTc 490 ms    P Axis 62 degrees    R Axis 59 degrees    T Axis 62 degrees    Diagnosis Line *** Poor data quality, interpretation may be adversely affected  Sinus tachycardia  Otherwise normal ECG           RADIOLOGY/IMAGING

## 2018-07-03 NOTE — SWALLOW BEDSIDE ASSESSMENT ADULT - SPECIFY REASON(S)
F/u subjective assessment of current swallow function to advance diet following improved alertness
Subjective assessment of current swallow function 2/2 risk of aspiration

## 2018-07-03 NOTE — SWALLOW BEDSIDE ASSESSMENT ADULT - ASR SWALLOW LABIAL MOBILITY
impaired retraction
unable to assess due to poor participation/comprehension but p/w adequate labial seal for PO trials

## 2018-07-03 NOTE — PHYSICAL THERAPY INITIAL EVALUATION ADULT - PLANNED THERAPY INTERVENTIONS, PT EVAL
motor coordination training/postural re-education/gait training/ROM/neuromuscular re-education/strengthening/transfer training/bed mobility training/balance training

## 2018-07-03 NOTE — SWALLOW BEDSIDE ASSESSMENT ADULT - H & P REVIEW
Consult received, chart, labs, radiology reviewed./yes
Consult received, chart, labs, radiology reviewed./yes

## 2018-07-03 NOTE — SWALLOW BEDSIDE ASSESSMENT ADULT - DIET PRIOR TO ADMI
Unable to ascertain as pt is nonverbal & unresponsive to clinician queries
Unknown as pt was not responsive to clinician queries

## 2018-07-03 NOTE — PROGRESS NOTE ADULT - SUBJECTIVE AND OBJECTIVE BOX
PGY 1 Note discussed with supervising resident and primary attending    Patient is a 67y old  Male who presents with a chief complaint of shortness of breath (30 Jun 2018 22:35)      INTERVAL HPI/OVERNIGHT EVENTS: offers no new complaints; patient is stable    MEDICATIONS  (STANDING):  amLODIPine   Tablet 5 milliGRAM(s) Oral daily  artificial  tears Solution 1 Drop(s) Both EYES two times a day  aspirin  chewable 81 milliGRAM(s) Oral daily  atorvastatin 40 milliGRAM(s) Oral at bedtime  carbidopa/levodopa  25/100 1 Tablet(s) Oral <User Schedule>  carbidopa/levodopa  25/100 1.5 Tablet(s) Oral <User Schedule>  clopidogrel Tablet 75 milliGRAM(s) Oral daily  dextrose 5% + sodium chloride 0.9%. 1000 milliLiter(s) (75 mL/Hr) IV Continuous <Continuous>  docusate sodium 100 milliGRAM(s) Oral two times a day  memantine 5 milliGRAM(s) Oral at bedtime  pantoprazole    Tablet 40 milliGRAM(s) Oral before breakfast  polyethylene glycol 3350 17 Gram(s) Oral daily  senna 2 Tablet(s) Oral at bedtime  traZODone 50 milliGRAM(s) Oral daily    MEDICATIONS  (PRN):      __________________________________________________  REVIEW OF SYSTEMS:    CONSTITUTIONAL: No fever,   EYES: no acute visual disturbances  NECK: No pain or stiffness  RESPIRATORY: No cough; No shortness of breath  CARDIOVASCULAR: No chest pain, no palpitations  GASTROINTESTINAL: No pain. No nausea or vomiting; No diarrhea   NEUROLOGICAL: No headache or numbness, no tremors  MUSCULOSKELETAL: No joint pain, no muscle pain  GENITOURINARY: no dysuria, no frequency, no hesitancy  PSYCHIATRY: no depression , no anxiety  ALL OTHER  ROS negative        Vital Signs Last 24 Hrs  T(C): 36.6 (03 Jul 2018 14:57), Max: 36.6 (03 Jul 2018 14:57)  T(F): 97.9 (03 Jul 2018 14:57), Max: 97.9 (03 Jul 2018 14:57)  HR: 65 (03 Jul 2018 14:57) (64 - 85)  BP: 124/59 (03 Jul 2018 14:57) (124/59 - 169/85)  BP(mean): --  RR: 18 (03 Jul 2018 14:57) (18 - 18)  SpO2: 100% (03 Jul 2018 14:57) (97% - 100%))    ________________________________________________  PHYSICAL EXAM:  GENERAL: NAD  HEENT: Normocephalic;  conjunctivae and sclerae clear; moist mucous membranes;   NECK : supple  CHEST/LUNG: Clear to auscultation bilaterally with good air entry   HEART: S1 S2  regular; no murmurs, gallops or rubs  ABDOMEN: Soft, Nontender, Nondistended; Bowel sounds present  EXTREMITIES: no cyanosis; no edema; no calf tenderness  SKIN: warm and dry; no rash  NERVOUS SYSTEM:  Awake but non verbal ; no new deficits    _________________________________________________  LABS:                         12.7   6.4   )-----------( 191      ( 03 Jul 2018 06:17 )             39.6   07-03    147<H>  |  113<H>  |  14  ----------------------------<  89  3.6   |  26  |  0.70    Ca    8.7      03 Jul 2018 06:17  Phos  2.8     07-03  Mg     2.4     07-03      RADIOLOGY & ADDITIONAL TESTS:    Imaging Personally Reviewed:  YES/NO    Consultant(s) Notes Reviewed:   YES/ No    Care Discussed with Consultants :     Plan of care was discussed with patient and /or primary care giver; all questions and concerns were addressed and care was aligned with patient's wishes.

## 2018-07-03 NOTE — SWALLOW BEDSIDE ASSESSMENT ADULT - PHARYNGEAL PHASE
Delayed pharyngeal swallow/reduced hyolaryngeal excursion Delayed pharyngeal swallow/reduced hyolaryngeal excursion/Cough post oral intake

## 2018-07-03 NOTE — PROGRESS NOTE ADULT - SUBJECTIVE AND OBJECTIVE BOX
Patient is a 67y old  Male who presents with a chief complaint of shortness of breath (30 Jun 2018 22:35)        Awake , alert  lying in bed , aphasic in NAD.    INTERVAL HPI/OVERNIGHT EVENTS:      VITAL SIGNS:  T(F): 97.7 (07-03-18 @ 05:12)  HR: 76 (07-03-18 @ 10:02)  BP: 151/70 (07-03-18 @ 10:03)  RR: 18 (07-03-18 @ 05:12)  SpO2: 97% (07-03-18 @ 10:03)  Wt(kg): --  I&O's Detail          REVIEW OF SYSTEMS:    CONSTITUTIONAL:  No fevers, chills, sweats    HEENT:  Eyes:  No diplopia or blurred vision. ENT:  No earache, sore throat or runny nose.    CARDIOVASCULAR:  No pressure, squeezing, tightness, or heaviness about the chest; no palpitations.    RESPIRATORY:  Per HPI    GASTROINTESTINAL:  No abdominal pain, nausea, vomiting or diarrhea.    GENITOURINARY:  No dysuria, frequency or urgency.    NEUROLOGIC:  No paresthesias, fasciculations, seizures or weakness.    PSYCHIATRIC:  No disorder of thought or mood.      PHYSICAL EXAM:    Constitutional: Well developed and nourished  Eyes:Perrla  ENMT: normal  Neck:supple  Respiratory: CTA b/l  Cardiovascular: S1 S2 regular  Gastrointestinal: Soft, Non tender  Extremities: No edema  Vascular:normal  Neurological:Awake, alert,Ox3  Musculoskeletal:Normal      MEDICATIONS  (STANDING):  amLODIPine   Tablet 5 milliGRAM(s) Oral daily  artificial  tears Solution 1 Drop(s) Both EYES two times a day  aspirin  chewable 81 milliGRAM(s) Oral daily  atorvastatin 40 milliGRAM(s) Oral at bedtime  carbidopa/levodopa  25/100 1 Tablet(s) Oral <User Schedule>  clopidogrel Tablet 75 milliGRAM(s) Oral daily  dextrose 5% + sodium chloride 0.9%. 1000 milliLiter(s) (75 mL/Hr) IV Continuous <Continuous>  docusate sodium 100 milliGRAM(s) Oral two times a day  memantine 5 milliGRAM(s) Oral at bedtime  pantoprazole    Tablet 40 milliGRAM(s) Oral before breakfast  polyethylene glycol 3350 17 Gram(s) Oral daily  senna 2 Tablet(s) Oral at bedtime  traZODone 50 milliGRAM(s) Oral daily    MEDICATIONS  (PRN):      Allergies    No Known Allergies    Intolerances        LABS:                        12.7   6.4   )-----------( 191      ( 03 Jul 2018 06:17 )             39.6     07-03    147<H>  |  113<H>  |  14  ----------------------------<  89  3.6   |  26  |  0.70    Ca    8.7      03 Jul 2018 06:17  Phos  2.8     07-03  Mg     2.4     07-03                CAPILLARY BLOOD GLUCOSE        pro-bnp 74 06-30 @ 17:49     d-dimer --  06-30 @ 17:49      RADIOLOGY & ADDITIONAL TESTS:    CXR:  < from: Xray Chest 1 View AP/PA (06.30.18 @ 18:06) >  IMPRESSION: Slightly increasing interstitial markings left base. Other   findings stable as above.    < end of copied text >    Ct scan chest:  < from: CT Abdomen and Pelvis w/ IV Cont (06.05.18 @ 18:37) >    IMPRESSION: No acute pathology. Cholelithiasis  Large amount of stool throughout the colon may indicate constipation  Mild superior compression fracture of L1      < end of copied text >    ekg;    echo: Patient is a 67y old  Male who presents with a chief complaint of shortness of breath (30 Jun 2018 22:35)  Awake , alert ,  lying in bed , aphasic in NAD.    INTERVAL HPI/OVERNIGHT EVENTS:      VITAL SIGNS:  T(F): 97.7 (07-03-18 @ 05:12)  HR: 76 (07-03-18 @ 10:02)  BP: 151/70 (07-03-18 @ 10:03)  RR: 18 (07-03-18 @ 05:12)  SpO2: 97% (07-03-18 @ 10:03)  Wt(kg): --  I&O's Detail          REVIEW OF SYSTEMS:    CONSTITUTIONAL:  No fevers, chills, sweats    HEENT:  Eyes:  No diplopia or blurred vision. ENT:  No earache, sore throat or runny nose.    CARDIOVASCULAR:  No pressure, squeezing, tightness, or heaviness about the chest; no palpitations.    RESPIRATORY:  Per HPI    GASTROINTESTINAL:  No abdominal pain, nausea, vomiting or diarrhea.    GENITOURINARY:  No dysuria, frequency or urgency.    NEUROLOGIC:  No paresthesias, fasciculations, seizures or weakness.    PSYCHIATRIC:  No disorder of thought or mood.      PHYSICAL EXAM:    Constitutional: Well developed and nourished  Eyes:Perrla  ENMT: normal  Neck:supple  Respiratory: CTA b/l  Cardiovascular: S1 S2 regular  Gastrointestinal: Soft, Non tender  Extremities: No edema  Vascular:normal  Neurological:Awake, alert,Ox3  Musculoskeletal:Normal      MEDICATIONS  (STANDING):  amLODIPine   Tablet 5 milliGRAM(s) Oral daily  artificial  tears Solution 1 Drop(s) Both EYES two times a day  aspirin  chewable 81 milliGRAM(s) Oral daily  atorvastatin 40 milliGRAM(s) Oral at bedtime  carbidopa/levodopa  25/100 1 Tablet(s) Oral <User Schedule>  clopidogrel Tablet 75 milliGRAM(s) Oral daily  dextrose 5% + sodium chloride 0.9%. 1000 milliLiter(s) (75 mL/Hr) IV Continuous <Continuous>  docusate sodium 100 milliGRAM(s) Oral two times a day  memantine 5 milliGRAM(s) Oral at bedtime  pantoprazole    Tablet 40 milliGRAM(s) Oral before breakfast  polyethylene glycol 3350 17 Gram(s) Oral daily  senna 2 Tablet(s) Oral at bedtime  traZODone 50 milliGRAM(s) Oral daily    MEDICATIONS  (PRN):      Allergies    No Known Allergies    Intolerances        LABS:                        12.7   6.4   )-----------( 191      ( 03 Jul 2018 06:17 )             39.6     07-03    147<H>  |  113<H>  |  14  ----------------------------<  89  3.6   |  26  |  0.70    Ca    8.7      03 Jul 2018 06:17  Phos  2.8     07-03  Mg     2.4     07-03                CAPILLARY BLOOD GLUCOSE        pro-bnp 74 06-30 @ 17:49     d-dimer --  06-30 @ 17:49      RADIOLOGY & ADDITIONAL TESTS:    CXR:  < from: Xray Chest 1 View AP/PA (06.30.18 @ 18:06) >  IMPRESSION: Slightly increasing interstitial markings left base. Other   findings stable as above.    < end of copied text >    Ct scan chest:  < from: CT Abdomen and Pelvis w/ IV Cont (06.05.18 @ 18:37) >    IMPRESSION: No acute pathology. Cholelithiasis  Large amount of stool throughout the colon may indicate constipation  Mild superior compression fracture of L1      < end of copied text >    ekg;    echo:

## 2018-07-03 NOTE — CONSULT NOTE ADULT - SUBJECTIVE AND OBJECTIVE BOX
History of Present Illness:    HPI:  66 y/o M pt with PMHx of Blindness of the R Eye, CVA (non-verbal at baseline), HTN, HLD, and Parkinson's Disease sent for [facility] who is only with him for past few days and now currently  not at the bedside  for cough with sputum production  and associated with mild shortness of breath . AS PER the E.D charts patient AWILDA denies any nausea , vomiting , abdominal pain or any other acute complaints . patient was recently admitted for [place] for constipation  and discharged on bowel regimen .    Later on the floor: Pt has been admitted with possible aspiration pneumonia due to dysphagia from progressive parkinson's disease. He is aphasic likely from prior left [place]'s area stroke. This information has been obtained from his current chart. He is also blind with right eye. I have been consulted for adjusting his parkinson's medications. Pt is currently bedbound. He does not move any extremities.     Allergies    No Known Allergies    Intolerances    PAST MEDICAL & SURGICAL HISTORY:  HLD (hyperlipidemia)  HTN (hypertension)  Parkinson's disease  Blindness of right eye  CVA (cerebral vascular accident)  History of laminectomy    Social History: [ ] Tobacco use, [ ] Alcohol use.  unknown    MEDICATIONS  (STANDING):  amLODIPine   Tablet 5 milliGRAM(s) Oral daily  artificial  tears Solution 1 Drop(s) Both EYES two times a day  aspirin  chewable 81 milliGRAM(s) Oral daily  atorvastatin 40 milliGRAM(s) Oral at bedtime  carbidopa/levodopa  25/100 1 Tablet(s) Oral <User Schedule>  clopidogrel Tablet 75 milliGRAM(s) Oral daily  dextrose 5% + sodium chloride 0.9%. 1000 milliLiter(s) (75 mL/Hr) IV Continuous <Continuous>  docusate sodium 100 milliGRAM(s) Oral two times a day  memantine 5 milliGRAM(s) Oral at bedtime  pantoprazole    Tablet 40 milliGRAM(s) Oral before breakfast  polyethylene glycol 3350 17 Gram(s) Oral daily  senna 2 Tablet(s) Oral at bedtime  traZODone 50 milliGRAM(s) Oral daily    Family:  FAMILY HISTORY:  No pertinent family history in first degree relatives    Review of Systems:  Pt is aphasic- unable to obtain  Vital Signs:    T(C): 36.5 (07-03-18 @ 05:12), Max: 37 (07-01-18 @ 22:12)  HR: 76 (07-03-18 @ 10:02) (57 - 100)  BP: 151/70 (07-03-18 @ 10:03) (108/64 - 169/85)  RR: 18 (07-03-18 @ 05:12) (17 - 18)  SpO2: 97% (07-03-18 @ 10:03) (97% - 100%)    Physical Exam:  General:  General Appearance - Well groomed, Not sickly   Build and nutrition - Well nourished and Well developed  Posture - bedbound    Head and Neck:  Head - normocephalic, atraumatic with no lesions or palpable masses    Chest and Lung exam:  Quiet, even and easy respiratory effort with no use of accessory muscles and on ausculation, normal breath sounds, no adventitious sounds and normal vocal resonance    Cardiovascular:  Auscultation: Normal heart sounds  Murmurs & Other heart sounds: Auscultation of the heart reveals- no murmurs  Carotid arteris: No Carotid bruits    Abdomen:  Palpation/Percussion: Non Tender, No Rebound tenderness, No hepatosplenomegaly, and No palpable abdominal masses    Peripheral Vascular:  Lower extremity: Inspection - Bilateral - No varicose veins  Palpation: Tenderness - bilateral - Non tender  Dorsalis pedis pulse - bilateral - normal  Edema - bilateral - no edema    Neurologic Exam:  Mental Status -  Alert, Awake  Pt is aphasic, does not follow any commends.  Cranial Nerves:  II Optic: pupils: reacting  He moves his eyes side by side.   Face; looks symmetrical.  Other cranial nerves are difficult to examine.    Motor:  Bulk and Contour: Normal  Tone: Normal  Strength: moves all four extremities to painful stimuli  General Assessment of Reflexes: Right Wrist - 1+ ;  Left Wrist - 1+ ; Right Elbow - 1+ ;  Left Elbow - 1+ ;  Right Knee - 1+ ;  Left Knee - 1+ ;   Right Ankle – 0 ; Left Ankle – 0  Plantar Reflexes(Babinski) (L4-S2) – Bilateral – Flexion  Sensory:  withdraw to painful stimuli  Cogwheel Rigidity - 2+  Resting tremors - absent  Bradykinesia - 2-

## 2018-07-03 NOTE — PROGRESS NOTE ADULT - PROBLEM SELECTOR PLAN 1
patient has no fevers and no leucocyte count   - procalcitonin 0.04  cxr : clear   follow with repeat  cxr in 48 hours to r/o  aspiration pneumonia   speech and swallow evaluation  -cont on Rocephin / metronidazole.

## 2018-07-03 NOTE — CONSULT NOTE ADULT - ASSESSMENT
1) Progressive parkinson's disease- Poorly controlled with his current medications. He may need a PEG and then increase the dose of Sinemet 25/100: 1.5 tab at 7 am, 1 tab at 11 am, 1.5 tab at 3 pm, and 1 tab at 7 pm. Physical therapy eval  2) Old left hemispheric stroke- on aspirin.  3) Aspiration Pneumonia- likely from dysphagia from progressive parkinson's disease and combined with h/o left brain stroke. - May need a PEG tube
A 66 yo Male with multiple co-morbidities including Blindness of the Right Eye,  Parkinson's Disease, who was recently discharged from [facility] after treated for constipation, and now sent in to the ER by [facility] for evaluation of productive cough  and mild shortness of breath. He has no fever or chills, no nausea or  vomiting. The CXR shows  increasing interstitial markings left base. He has started on Ceftriaxone and Flagyl, and the ID consult requested to assist with further evaluation and antibiotic management.     # Suspected Aspiration pneumonia    Would recommend:  1. Obtain Procalcitonin level  2. Aspiration precaution  3. Change Ceftriaxone to Cefepime and c/w flagyl until work up is done  4. Consider CT chest w/o contrast for further evaluation of pneumonia     d/w Nursing staff    will follow the patient with you and make further recommendation based on the clinical course and Lab results  Thank you for the opportunity to participate in Mr. GRECO's care
1. Poor po intake  2.Failkure to thrive  3. Weight loss  4. Patient can benefit from PEG tube for feeding andmedication    Suggestions:    1. Palliative evaluation  2. Aspiration precaution  3. Speech swallow evaluation  4. Avoid NSAID  5. Schedule for PEG tube plavcement  6. DVT prophylaxis

## 2018-07-03 NOTE — PROGRESS NOTE ADULT - SUBJECTIVE AND OBJECTIVE BOX
Patient was seen and examined  Patient is a 67y old  Male who presents with a chief complaint of shortness of breath (30 Jun 2018 22:35)      INTERVAL HPI/OVERNIGHT EVENTS:  T(C): 36.5 (07-03-18 @ 05:12), Max: 36.5 (07-03-18 @ 05:12)  HR: 76 (07-03-18 @ 10:02) (64 - 85)  BP: 151/70 (07-03-18 @ 10:03) (128/66 - 169/85)  RR: 18 (07-03-18 @ 05:12) (18 - 18)  SpO2: 97% (07-03-18 @ 10:03) (97% - 100%)  Wt(kg): --  I&O's Summary      LABS:                        12.7   6.4   )-----------( 191      ( 03 Jul 2018 06:17 )             39.6     07-03    147<H>  |  113<H>  |  14  ----------------------------<  89  3.6   |  26  |  0.70    Ca    8.7      03 Jul 2018 06:17  Phos  2.8     07-03  Mg     2.4     07-03          CAPILLARY BLOOD GLUCOSE                  MEDICATIONS  (STANDING):  amLODIPine   Tablet 5 milliGRAM(s) Oral daily  artificial  tears Solution 1 Drop(s) Both EYES two times a day  aspirin  chewable 81 milliGRAM(s) Oral daily  atorvastatin 40 milliGRAM(s) Oral at bedtime  carbidopa/levodopa  25/100 1 Tablet(s) Oral <User Schedule>  clopidogrel Tablet 75 milliGRAM(s) Oral daily  dextrose 5% + sodium chloride 0.9%. 1000 milliLiter(s) (75 mL/Hr) IV Continuous <Continuous>  docusate sodium 100 milliGRAM(s) Oral two times a day  memantine 5 milliGRAM(s) Oral at bedtime  pantoprazole    Tablet 40 milliGRAM(s) Oral before breakfast  polyethylene glycol 3350 17 Gram(s) Oral daily  senna 2 Tablet(s) Oral at bedtime  traZODone 50 milliGRAM(s) Oral daily    MEDICATIONS  (PRN):      RADIOLOGY & ADDITIONAL TESTS:    Imaging Personally Reviewed:  [ ] YES  [ ] NO    REVIEW OF SYSTEMS:  awake no apparent distress      Consultant(s) Notes Reviewed:  [ x ] YES  [ ] NO    PHYSICAL EXAM:  GENERAL: NAD, well-groomed, well-developed  HEAD:  Atraumatic, Normocephalic  EYES: EOMI, PERRLA, conjunctiva and sclera clear  ENMT: No tonsillar erythema, exudates, or enlargement; Moist mucous membranes, Good dentition, No lesions  NECK: Supple, No JVD, Normal thyroid  NERVOUS SYSTEM:  awake and alert  CHEST/LUNG: Clear to percussion bilaterally; No rales, rhonchi, wheezing, or rubs  HEART: Regular rate and rhythm; No murmurs, rubs, or gallops  ABDOMEN: Soft, Nontender, Nondistended; Bowel sounds present  EXTREMITIES:  2+ Peripheral Pulses, No clubbing, cyanosis, or edema  LYMPH: No lymphadenopathy noted  SKIN: No rashes or lesions    Care Discussed with Consultants/Other Providers [ x] YES  [ ] NO

## 2018-07-03 NOTE — SWALLOW BEDSIDE ASSESSMENT ADULT - ASR SWALLOW ASPIRATION MONITOR
change of breathing pattern/cough/throat clearing/upper respiratory infection/gurgly voice/pneumonia/oral hygiene/position upright (90Y)/fever
gurgly voice/cough/upper respiratory infection/change of breathing pattern/position upright (90Y)/fever/throat clearing/oral hygiene/pneumonia

## 2018-07-03 NOTE — SWALLOW BEDSIDE ASSESSMENT ADULT - SWALLOW EVAL: CRITERIA FOR SKILLED INTERVENTION MET
no problems identified which require skilled intervention
not appropriate for swallowing intervention

## 2018-07-03 NOTE — SWALLOW BEDSIDE ASSESSMENT ADULT - COMMENTS
Pt HOB elevated to 90 degrees. A,A, nonverbal, inconsistently followed clinician directives following clinician model, unresponsive to queries.
Pt previously seen on 7/2/18, recommended NPO except meds with puree as pt p/w aspiration risk and had difficulty managing secretions. Pt seen for f/u bedside swallow evaluation today. HOB elevated to 45 degrees. Pt p/w increased alertness compared to prior eval, nonverbal but able to follow directives following clinician model, improved management of secretions.

## 2018-07-03 NOTE — PHYSICAL THERAPY INITIAL EVALUATION ADULT - IMPAIRMENTS FOUND, PT EVAL
gross motor/aerobic capacity/endurance/cognitive impairment/gait, locomotion, and balance/muscle strength/ergonomics and body mechanics/posture/visual motor yes

## 2018-07-03 NOTE — SWALLOW BEDSIDE ASSESSMENT ADULT - SWALLOW EVAL: DIAGNOSIS
Pt p/w s&s oropharyngeal dysphagia c/b reduced AP transport, slow oral transit, delayed swallow trigger, & reduced hyolaryngeal excursion of all consistencies. Overt s&s of penetration/aspiration noted on nectar-thick liquids.
Pt p/w oropharyngeal dysphagia c/b reduced AP transport, slow oral transit, & delayed pharyngeal swallow of puree & thin liquids. Overt s&s of penetration/aspiration on puree & thin liquids, as pt p/w wet vocal quality following PO trials. Pt p/w aspiration risk as pt has difficulty managing secretions.

## 2018-07-03 NOTE — SWALLOW BEDSIDE ASSESSMENT ADULT - SWALLOW EVAL: RECOMMENDED FEEDING/EATING TECHNIQUES
maintain upright posture during/after eating for 30 mins/oral hygiene/small sips/bites/alternate food with liquid/hard swallow w/ each bite or sip/position upright (90 degrees)/allow for swallow between intakes/check mouth frequently for oral residue/pocketing/crush medication (when feasible)
small sips/bites/alternate food with liquid/hard swallow w/ each bite or sip/allow for swallow between intakes/check mouth frequently for oral residue/pocketing/maintain upright posture during/after eating for 30 mins

## 2018-07-04 DIAGNOSIS — R13.12 DYSPHAGIA, OROPHARYNGEAL PHASE: ICD-10-CM

## 2018-07-04 DIAGNOSIS — Z51.5 ENCOUNTER FOR PALLIATIVE CARE: ICD-10-CM

## 2018-07-04 DIAGNOSIS — G20 PARKINSON'S DISEASE: ICD-10-CM

## 2018-07-04 DIAGNOSIS — R53.81 OTHER MALAISE: ICD-10-CM

## 2018-07-04 PROCEDURE — 93010 ELECTROCARDIOGRAM REPORT: CPT

## 2018-07-04 PROCEDURE — 99223 1ST HOSP IP/OBS HIGH 75: CPT

## 2018-07-04 RX ADMIN — PANTOPRAZOLE SODIUM 40 MILLIGRAM(S): 20 TABLET, DELAYED RELEASE ORAL at 05:21

## 2018-07-04 RX ADMIN — CARBIDOPA AND LEVODOPA 1 TABLET(S): 25; 100 TABLET ORAL at 18:29

## 2018-07-04 RX ADMIN — Medication 1 DROP(S): at 05:22

## 2018-07-04 RX ADMIN — Medication 81 MILLIGRAM(S): at 11:57

## 2018-07-04 RX ADMIN — ATORVASTATIN CALCIUM 40 MILLIGRAM(S): 80 TABLET, FILM COATED ORAL at 22:27

## 2018-07-04 RX ADMIN — MEMANTINE HYDROCHLORIDE 5 MILLIGRAM(S): 10 TABLET ORAL at 22:27

## 2018-07-04 RX ADMIN — Medication 1 DROP(S): at 17:21

## 2018-07-04 RX ADMIN — CARBIDOPA AND LEVODOPA 1.5 TABLET(S): 25; 100 TABLET ORAL at 16:33

## 2018-07-04 RX ADMIN — CARBIDOPA AND LEVODOPA 1.5 TABLET(S): 25; 100 TABLET ORAL at 05:22

## 2018-07-04 RX ADMIN — SENNA PLUS 2 TABLET(S): 8.6 TABLET ORAL at 22:28

## 2018-07-04 RX ADMIN — CLOPIDOGREL BISULFATE 75 MILLIGRAM(S): 75 TABLET, FILM COATED ORAL at 11:57

## 2018-07-04 RX ADMIN — CARBIDOPA AND LEVODOPA 1 TABLET(S): 25; 100 TABLET ORAL at 11:50

## 2018-07-04 RX ADMIN — AMLODIPINE BESYLATE 5 MILLIGRAM(S): 2.5 TABLET ORAL at 05:21

## 2018-07-04 RX ADMIN — Medication 50 MILLIGRAM(S): at 11:57

## 2018-07-04 RX ADMIN — POLYETHYLENE GLYCOL 3350 17 GRAM(S): 17 POWDER, FOR SOLUTION ORAL at 11:57

## 2018-07-04 NOTE — CONSULT NOTE ADULT - CONSULT REASON
Aspiration pneumonia
Pneumonia
goals of care - PEG placement?
Failure to thrive, PEG tube placement
Parkinson's disease medication adjustment

## 2018-07-04 NOTE — PROGRESS NOTE ADULT - SUBJECTIVE AND OBJECTIVE BOX
Patient is a 67y old  Male who presents with a chief complaint of shortness of breath (30 Jun 2018 22:35)  Awake , alert ,  lying in bed , aphasic in NAD. Pt has no new complaints.    INTERVAL HPI/OVERNIGHT EVENTS:      VITAL SIGNS:  T(F): 97.9 (07-04-18 @ 05:18)  HR: 71 (07-04-18 @ 05:18)  BP: 128/54 (07-04-18 @ 05:18)  RR: 18 (07-04-18 @ 05:18)  SpO2: 100% (07-04-18 @ 05:18)  Wt(kg): --  I&O's Detail          REVIEW OF SYSTEMS:    CONSTITUTIONAL:  No fevers, chills, sweats    HEENT:  Eyes:  No diplopia or blurred vision. ENT:  No earache, sore throat or runny nose.    CARDIOVASCULAR:  No pressure, squeezing, tightness, or heaviness about the chest; no palpitations.    RESPIRATORY:  Per HPI    GASTROINTESTINAL:  No abdominal pain, nausea, vomiting or diarrhea.    GENITOURINARY:  No dysuria, frequency or urgency.    NEUROLOGIC:  No paresthesias, fasciculations, seizures or weakness.    PSYCHIATRIC:  No disorder of thought or mood.      PHYSICAL EXAM:    Constitutional: Well developed and nourished  Eyes:Perrla  ENMT: normal  Neck:supple  Respiratory: good air entry  Cardiovascular: S1 S2 regular  Gastrointestinal: Soft, Non tender  Extremities: No edema  Vascular:normal  Neurological:Awake, alert,Ox3  Musculoskeletal:Normal      MEDICATIONS  (STANDING):  amLODIPine   Tablet 5 milliGRAM(s) Oral daily  artificial  tears Solution 1 Drop(s) Both EYES two times a day  aspirin  chewable 81 milliGRAM(s) Oral daily  atorvastatin 40 milliGRAM(s) Oral at bedtime  carbidopa/levodopa  25/100 1 Tablet(s) Oral <User Schedule>  carbidopa/levodopa  25/100 1.5 Tablet(s) Oral <User Schedule>  clopidogrel Tablet 75 milliGRAM(s) Oral daily  docusate sodium 100 milliGRAM(s) Oral two times a day  memantine 5 milliGRAM(s) Oral at bedtime  pantoprazole    Tablet 40 milliGRAM(s) Oral before breakfast  polyethylene glycol 3350 17 Gram(s) Oral daily  senna 2 Tablet(s) Oral at bedtime  traZODone 50 milliGRAM(s) Oral daily    MEDICATIONS  (PRN):      Allergies    No Known Allergies    Intolerances        LABS:                        12.7   6.4   )-----------( 191      ( 03 Jul 2018 06:17 )             39.6     07-03    147<H>  |  113<H>  |  14  ----------------------------<  89  3.6   |  26  |  0.70    Ca    8.7      03 Jul 2018 06:17  Phos  2.8     07-03  Mg     2.4     07-03                CAPILLARY BLOOD GLUCOSE        pro-bnp 74 06-30 @ 17:49     d-dimer --  06-30 @ 17:49      RADIOLOGY & ADDITIONAL TESTS:    CXR:  < from: Xray Chest 1 View AP/PA (06.30.18 @ 18:06) >  IMPRESSION: Slightly increasing interstitial markings left base. Other   findings stable as above.    < end of copied text >    Ct scan chest:  < from: CT Abdomen and Pelvis w/ IV Cont (06.05.18 @ 18:37) >  IMPRESSION: No acute pathology. Cholelithiasis  Large amount of stool throughout the colon may indicate constipation  Mild superior compression fracture of L1    < end of copied text >    ekg;    echo: Patient is a 67y old  Male who presents with a chief complaint of shortness of breath (30 Jun 2018 22:35)  Awake , alert ,  lying in bed , aphasic . Pt has no resp distress.    INTERVAL HPI/OVERNIGHT EVENTS:      VITAL SIGNS:  T(F): 97.9 (07-04-18 @ 05:18)  HR: 71 (07-04-18 @ 05:18)  BP: 128/54 (07-04-18 @ 05:18)  RR: 18 (07-04-18 @ 05:18)  SpO2: 100% (07-04-18 @ 05:18)  Wt(kg): --  I&O's Detail          REVIEW OF SYSTEMS:    CONSTITUTIONAL:  No fevers, chills, sweats    HEENT:  Eyes:  No diplopia or blurred vision. ENT:  No earache, sore throat or runny nose.    CARDIOVASCULAR:  No pressure, squeezing, tightness, or heaviness about the chest; no palpitations.    RESPIRATORY:  Per HPI    GASTROINTESTINAL:  No abdominal pain, nausea, vomiting or diarrhea.    GENITOURINARY:  No dysuria, frequency or urgency.    NEUROLOGIC:  No paresthesias, fasciculations, seizures or weakness.    PSYCHIATRIC:  No disorder of thought or mood.      PHYSICAL EXAM:    Constitutional: Well developed and nourished  Eyes:Perrla  ENMT: normal  Neck:supple  Respiratory: good air entry  Cardiovascular: S1 S2 regular  Gastrointestinal: Soft, Non tender  Extremities: No edema  Vascular:normal  Neurological:Awake, alert,Ox3  Musculoskeletal:Normal      MEDICATIONS  (STANDING):  amLODIPine   Tablet 5 milliGRAM(s) Oral daily  artificial  tears Solution 1 Drop(s) Both EYES two times a day  aspirin  chewable 81 milliGRAM(s) Oral daily  atorvastatin 40 milliGRAM(s) Oral at bedtime  carbidopa/levodopa  25/100 1 Tablet(s) Oral <User Schedule>  carbidopa/levodopa  25/100 1.5 Tablet(s) Oral <User Schedule>  clopidogrel Tablet 75 milliGRAM(s) Oral daily  docusate sodium 100 milliGRAM(s) Oral two times a day  memantine 5 milliGRAM(s) Oral at bedtime  pantoprazole    Tablet 40 milliGRAM(s) Oral before breakfast  polyethylene glycol 3350 17 Gram(s) Oral daily  senna 2 Tablet(s) Oral at bedtime  traZODone 50 milliGRAM(s) Oral daily    MEDICATIONS  (PRN):      Allergies    No Known Allergies    Intolerances        LABS:                        12.7   6.4   )-----------( 191      ( 03 Jul 2018 06:17 )             39.6     07-03    147<H>  |  113<H>  |  14  ----------------------------<  89  3.6   |  26  |  0.70    Ca    8.7      03 Jul 2018 06:17  Phos  2.8     07-03  Mg     2.4     07-03                CAPILLARY BLOOD GLUCOSE        pro-bnp 74 06-30 @ 17:49     d-dimer --  06-30 @ 17:49      RADIOLOGY & ADDITIONAL TESTS:    CXR:  < from: Xray Chest 1 View AP/PA (06.30.18 @ 18:06) >  IMPRESSION: Slightly increasing interstitial markings left base. Other   findings stable as above.    < end of copied text >    Ct scan chest:  < from: CT Abdomen and Pelvis w/ IV Cont (06.05.18 @ 18:37) >  IMPRESSION: No acute pathology. Cholelithiasis  Large amount of stool throughout the colon may indicate constipation  Mild superior compression fracture of L1    < end of copied text >    ekg;    echo:

## 2018-07-04 NOTE — CONSULT NOTE ADULT - PROBLEM SELECTOR RECOMMENDATION 9
FAST 7C; progressive PD, old CVA. Pt nonverbal, ambulatory at baseline. Pt requires 24 hr care FAST 7C; progressive PD, old CVA. Pt nonverbal, ambulatory at baseline. Pt requires 24 hr care. Full code on file. FAST 7C; progressive PD, old CVA. Pt nonverbal, requires assist with ADLs at baseline. Pt requires 24 hr care. Full code on file.

## 2018-07-04 NOTE — PROGRESS NOTE ADULT - SUBJECTIVE AND OBJECTIVE BOX
Patient was seen and examined  Patient is a 67y old  Male who presents with a chief complaint of shortness of breath (30 Jun 2018 22:35)      INTERVAL HPI/OVERNIGHT EVENTS:  T(C): 36.6 (07-04-18 @ 05:18), Max: 36.6 (07-03-18 @ 14:57)  HR: 71 (07-04-18 @ 05:18) (63 - 76)  BP: 128/54 (07-04-18 @ 05:18) (124/59 - 151/70)  RR: 18 (07-04-18 @ 05:18) (17 - 18)  SpO2: 100% (07-04-18 @ 05:18) (97% - 100%)  Wt(kg): --  I&O's Summary      LABS:                        12.7   6.4   )-----------( 191      ( 03 Jul 2018 06:17 )             39.6     07-03    147<H>  |  113<H>  |  14  ----------------------------<  89  3.6   |  26  |  0.70    Ca    8.7      03 Jul 2018 06:17  Phos  2.8     07-03  Mg     2.4     07-03      MEDICATIONS  (STANDING):  amLODIPine   Tablet 5 milliGRAM(s) Oral daily  artificial  tears Solution 1 Drop(s) Both EYES two times a day  aspirin  chewable 81 milliGRAM(s) Oral daily  atorvastatin 40 milliGRAM(s) Oral at bedtime  carbidopa/levodopa  25/100 1 Tablet(s) Oral <User Schedule>  carbidopa/levodopa  25/100 1.5 Tablet(s) Oral <User Schedule>  clopidogrel Tablet 75 milliGRAM(s) Oral daily  dextrose 5% + sodium chloride 0.9%. 1000 milliLiter(s) (75 mL/Hr) IV Continuous <Continuous>  docusate sodium 100 milliGRAM(s) Oral two times a day  memantine 5 milliGRAM(s) Oral at bedtime  pantoprazole    Tablet 40 milliGRAM(s) Oral before breakfast  polyethylene glycol 3350 17 Gram(s) Oral daily  senna 2 Tablet(s) Oral at bedtime  traZODone 50 milliGRAM(s) Oral daily    MEDICATIONS  (PRN):      RADIOLOGY & ADDITIONAL TESTS:    Imaging Personally Reviewed:  [ ] YES  [ ] NO    REVIEW OF SYSTEMS:  NAD       Consultant(s) Notes Reviewed:  [ x ] YES  [ ] NO    PHYSICAL EXAM:  GENERAL: NAD, well-groomed, well-developed  HEAD:  Atraumatic, Normocephalic  EYES: EOMI, PERRLA, conjunctiva and sclera clear  ENMT: No tonsillar erythema, exudates, or enlargement; Moist mucous membranes, Good dentition, No lesions  NECK: Supple, No JVD, Normal thyroid  NERVOUS SYSTEM: awake cogwheel rigidity   CHEST/LUNG: Clear to percussion bilaterally; No rales, rhonchi, wheezing, or rubs  HEART: Regular rate and rhythm; No murmurs, rubs, or gallops  ABDOMEN: Soft, Nontender, Nondistended; Bowel sounds present  EXTREMITIES:  2+ Peripheral Pulses, No clubbing, cyanosis, or edema  LYMPH: No lymphadenopathy noted  SKIN: No rashes or lesions    Care Discussed with Consultants/Other Providers [ x] YES  [ ] NO

## 2018-07-04 NOTE — CONSULT NOTE ADULT - PROBLEM SELECTOR RECOMMENDATION 3
2/2 PD/dementia, old CVA. Pt nonverbal, nonambulatory at baseline. Requires 24 hr care. 2/2 PD/dementia, old CVA. Pt nonverbal, requires assist with ADLs at baseline. Requires 24 hr care.

## 2018-07-04 NOTE — PROGRESS NOTE ADULT - SUBJECTIVE AND OBJECTIVE BOX
PGY 1 Note discussed with supervising resident and primary attending    Patient is a 67y old  Male who presents with a chief complaint of shortness of breath (30 Jun 2018 22:35)      INTERVAL HPI/OVERNIGHT EVENTS: offers no new complaints; patient is stable,     MEDICATIONS  (STANDING):  amLODIPine   Tablet 5 milliGRAM(s) Oral daily  artificial  tears Solution 1 Drop(s) Both EYES two times a day  aspirin  chewable 81 milliGRAM(s) Oral daily  atorvastatin 40 milliGRAM(s) Oral at bedtime  carbidopa/levodopa  25/100 1 Tablet(s) Oral <User Schedule>  carbidopa/levodopa  25/100 1.5 Tablet(s) Oral <User Schedule>  clopidogrel Tablet 75 milliGRAM(s) Oral daily  docusate sodium 100 milliGRAM(s) Oral two times a day  memantine 5 milliGRAM(s) Oral at bedtime  pantoprazole    Tablet 40 milliGRAM(s) Oral before breakfast  polyethylene glycol 3350 17 Gram(s) Oral daily  senna 2 Tablet(s) Oral at bedtime  traZODone 50 milliGRAM(s) Oral daily    REVIEW OF SYSTEMS:    CONSTITUTIONAL: No fever,   EYES: blind  right eye   NECK: No pain or stiffness  RESPIRATORY: No cough; No shortness of breath  CARDIOVASCULAR: No chest pain, no palpitations  GASTROINTESTINAL: No pain. No nausea or vomiting; No diarrhea, difficulty swallowing   NEUROLOGICAL: No headache or numbness, tremors+, decreased movements, non verbel  MUSCULOSKELETAL: No joint pain, no muscle pain,   GENITOURINARY: no dysuria, no frequency, no hesitancy  PSYCHIATRY: no depression , no anxiety, parkinson disease+  ALL OTHER  ROS negative        Vital Signs Last 24 Hrs  T(C): 36.7 (04 Jul 2018 14:12), Max: 36.7 (04 Jul 2018 14:12)  T(F): 98 (04 Jul 2018 14:12), Max: 98 (04 Jul 2018 14:12)  HR: 66 (04 Jul 2018 14:12) (63 - 71)  BP: 126/61 (04 Jul 2018 14:12) (126/61 - 130/70)  BP(mean): --  RR: 18 (04 Jul 2018 14:12) (17 - 18)  SpO2: 100% (04 Jul 2018 14:12) (100% - 100%)________________________________________________    PHYSICAL EXAM:  GENERAL: NAD  HEENT: Normocephalic;  conjunctivae and sclerae clear; moist mucous membranes; decreased swallow  NECK : supple  CHEST/LUNG: Clear to auscultation bilaterally with good air entry   HEART: S1 S2  regular; no murmurs, gallops or rubs  ABDOMEN: Soft, Nontender, Nondistended; Bowel sounds present  EXTREMITIES: no cyanosis; no edema; no calf tenderness. tremors+, decreased ROM, bradykinesia  SKIN: warm and dry; no rash  NERVOUS SYSTEM:  Awake but non verbal ; no new deficits    _________________________________________________  LABS                        12.7   6.4   )-----------( 191      ( 03 Jul 2018 06:17 )             39.6   07-03    147<H>  |  113<H>  |  14  ----------------------------<  89  3.6   |  26  |  0.70    Ca    8.7      03 Jul 2018 06:17  Phos  2.8     07-03  Mg     2.4     07-03    RADIOLOGY & ADDITIONAL TESTS:    Imaging Personally Reviewed:  YES/NO    Consultant(s) Notes Reviewed:   YES/ No    Care Discussed with Consultants :     Plan of care was discussed with patient and /or primary care giver; all questions and concerns were addressed and care was aligned with patient's wishes.

## 2018-07-04 NOTE — PROGRESS NOTE ADULT - SUBJECTIVE AND OBJECTIVE BOX
Patient is seen and examined at the bed side, is afebrile. He has no new events.      REVIEW OF SYSTEMS: Unable to obtain due to Mental status      ALLERGIES: NKDA        Vital Signs Last 24 Hrs  T(C): 36.7 (04 Jul 2018 14:12), Max: 36.7 (04 Jul 2018 14:12)  T(F): 98 (04 Jul 2018 14:12), Max: 98 (04 Jul 2018 14:12)  HR: 66 (04 Jul 2018 14:12) (63 - 71)  BP: 126/61 (04 Jul 2018 14:12) (126/61 - 130/70)  BP(mean): --  RR: 18 (04 Jul 2018 14:12) (17 - 18)  SpO2: 100% (04 Jul 2018 14:12) (100% - 100%)      PHYSICAL EXAM:  GENERAL: Not in distress  CHEST/LUNG: Air entry  bilaterally  HEART: s1 and s2 present  ABDOMEN: Nontender, Nondistended  EXTREMITIES:  No pedal  edema  CNS: Awake and alert          LABS:  No new Labs                        12.7   6.4   )-----------( 191      ( 03 Jul 2018 06:17 )             39.6                         14.1   8.5   )-----------( 231      ( 30 Jun 2018 17:49 )             44.8         07-03    147<H>  |  113<H>  |  14  ----------------------------<  89  3.6   |  26  |  0.70    Ca    8.7      03 Jul 2018 06:17  Phos  2.8     07-03  Mg     2.4     07-03        MEDICATIONS  (STANDING):  amLODIPine   Tablet 5 milliGRAM(s) Oral daily  artificial  tears Solution 1 Drop(s) Both EYES two times a day  aspirin  chewable 81 milliGRAM(s) Oral daily  atorvastatin 40 milliGRAM(s) Oral at bedtime  carbidopa/levodopa  25/100 1 Tablet(s) Oral <User Schedule>  carbidopa/levodopa  25/100 1.5 Tablet(s) Oral <User Schedule>  clopidogrel Tablet 75 milliGRAM(s) Oral daily  docusate sodium 100 milliGRAM(s) Oral two times a day  memantine 5 milliGRAM(s) Oral at bedtime  pantoprazole    Tablet 40 milliGRAM(s) Oral before breakfast  polyethylene glycol 3350 17 Gram(s) Oral daily  senna 2 Tablet(s) Oral at bedtime  traZODone 50 milliGRAM(s) Oral daily    MEDICATIONS  (PRN):        RADIOLOGY & ADDITIONAL TESTS:      6/30/18: Xray Chest 1 View AP/PA (06.30.18 @ 18:06) : Slightly increasing interstitial markings left base.       MICROBIOLOGY DATA:      Rapid Respiratory Viral Panel (07.01.18 @ 22:02)    Rapid RVP Result: [facility]: The FilmArray RVP Rapid uses polymerase chain reaction (PCR) and melt  curve analysis to screen for adenovirus; coronavirus HKU1, NL63, 229E,  OC43; human metapneumovirus (hMPV); human enterovirus/rhinovirus  (Entero/RV); influenza A; influenza A/H1;influenza A/H3; influenza  A/H1-2009; influenza B; parainfluenza viruses 1, 2, 3, 4; respiratory  syncytial virus; Bordetella pertussis; Mycoplasma pneumoniae; and  Chlamydophila pneumoniae.    Culture - Blood (07.01.18 @ 00:12)    Specimen Source: .Blood Blood    Culture Results:   No growth to date.    Culture - Blood (07.01.18 @ 00:12)    Specimen Source: .Blood Blood    Culture Results:   No growth to date.

## 2018-07-04 NOTE — CONSULT NOTE ADULT - PROBLEM SELECTOR RECOMMENDATION 4
Britt Chadwick (niece/surrogate): [phone]. Full code on file. Pt's niece/surrogate = Britt Chadwick: [phone]. LM for niece to further discuss goals of care/advance directives ie artificial nutrition.

## 2018-07-04 NOTE — CONSULT NOTE ADULT - SUBJECTIVE AND OBJECTIVE BOX
HPI:  66 y/o M pt with PMHx of Blindness of the R Eye, CVA (non-verbal at baseline), HTN, HLD, and Parkinson's Disease sent for HHA who is only with him for past few days and now currently  not at the bedside  for cough with sputum production  and associated with mild shortness of breath. Request to discuss goals of care ie PEG placement. Full code on file.       IN the E.D patient vitals are stable   cxr : clear   labs :  no leucocytosis (30 Jun 2018 22:35)      PAST MEDICAL & SURGICAL HISTORY:  HLD (hyperlipidemia)  HTN (hypertension)  Parkinson's disease  Blindness of right eye  CVA (cerebral vascular accident)  History of laminectomy    SOCIAL HISTORY:    Admitted from:  home, [facility]  Preferred Language: english    Surrogate/HCP/Guardian: Britt Chadwick (niece/surrogate): [phone]    FAMILY HISTORY:  No pertinent family history in first degree relatives    Baseline ADLs (prior to admission): nonverbal,     No Known Allergies    Present Symptoms:      Review of Systems:  Unable to obtain due to poor mentation    MEDICATIONS  (STANDING):  amLODIPine   Tablet 5 milliGRAM(s) Oral daily  artificial  tears Solution 1 Drop(s) Both EYES two times a day  aspirin  chewable 81 milliGRAM(s) Oral daily  atorvastatin 40 milliGRAM(s) Oral at bedtime  carbidopa/levodopa  25/100 1 Tablet(s) Oral <User Schedule>  carbidopa/levodopa  25/100 1.5 Tablet(s) Oral <User Schedule>  clopidogrel Tablet 75 milliGRAM(s) Oral daily  docusate sodium 100 milliGRAM(s) Oral two times a day  memantine 5 milliGRAM(s) Oral at bedtime  pantoprazole    Tablet 40 milliGRAM(s) Oral before breakfast  polyethylene glycol 3350 17 Gram(s) Oral daily  senna 2 Tablet(s) Oral at bedtime  traZODone 50 milliGRAM(s) Oral daily    MEDICATIONS  (PRN):      PHYSICAL EXAM:    Vital Signs Last 24 Hrs  T(C): 36.6 (04 Jul 2018 05:18), Max: 36.6 (03 Jul 2018 14:57)  T(F): 97.9 (04 Jul 2018 05:18), Max: 97.9 (03 Jul 2018 14:57)  HR: 71 (04 Jul 2018 05:18) (63 - 71)  BP: 128/54 (04 Jul 2018 05:18) (124/59 - 130/70)  BP(mean): --  RR: 18 (04 Jul 2018 05:18) (17 - 18)  SpO2: 100% (04 Jul 2018 05:18) (100% - 100%)    General: pt awake, nonverbal, unable to follow commands   Karnofsky Performance Score/Palliative Performance Status Version2:     30%    HEENT: normal    Lungs: comfortable   CV: normal    GI:  incontinent  :   incontinent    Musculoskeletal: nonambulatory, assist with ADLs  Neuro:  cognitive impairment dysphagia  Diet: pureed/honey    LABS:                        12.7   6.4   )-----------( 191      ( 03 Jul 2018 06:17 )             39.6     07-03    147<H>  |  113<H>  |  14  ----------------------------<  89  3.6   |  26  |  0.70    Ca    8.7      03 Jul 2018 06:17  Phos  2.8     07-03  Mg     2.4     07-03          RADIOLOGY & ADDITIONAL STUDIES: reviewed    ADVANCE DIRECTIVES:   Advanced Care Planning discussion total time spent: HPI:  68 y/o M pt with PMHx of Blindness of the R Eye, CVA (non-verbal at baseline), HTN, HLD, and Parkinson's Disease sent for HHA who is only with him for past few days and now currently  not at the bedside  for cough with sputum production  and associated with mild shortness of breath. Request to discuss goals of care ie PEG placement. Full code on file.     PAST MEDICAL & SURGICAL HISTORY:  HLD (hyperlipidemia)  HTN (hypertension)  Parkinson's disease  Blindness of right eye  CVA (cerebral vascular accident)  History of laminectomy    SOCIAL HISTORY:    Admitted from:  home, [facility]  Preferred Language: english    Surrogate/HCP/Guardian: Britt Chadwick (niece/surrogate): [phone]    FAMILY HISTORY:  No pertinent family history in first degree relatives    Baseline ADLs (prior to admission): nonverbal,     No Known Allergies    Present Symptoms:      Review of Systems:  Unable to obtain due to poor mentation    MEDICATIONS  (STANDING):  amLODIPine   Tablet 5 milliGRAM(s) Oral daily  artificial  tears Solution 1 Drop(s) Both EYES two times a day  aspirin  chewable 81 milliGRAM(s) Oral daily  atorvastatin 40 milliGRAM(s) Oral at bedtime  carbidopa/levodopa  25/100 1 Tablet(s) Oral <User Schedule>  carbidopa/levodopa  25/100 1.5 Tablet(s) Oral <User Schedule>  clopidogrel Tablet 75 milliGRAM(s) Oral daily  docusate sodium 100 milliGRAM(s) Oral two times a day  memantine 5 milliGRAM(s) Oral at bedtime  pantoprazole    Tablet 40 milliGRAM(s) Oral before breakfast  polyethylene glycol 3350 17 Gram(s) Oral daily  senna 2 Tablet(s) Oral at bedtime  traZODone 50 milliGRAM(s) Oral daily    MEDICATIONS  (PRN):      PHYSICAL EXAM:    Vital Signs Last 24 Hrs  T(C): 36.6 (04 Jul 2018 05:18), Max: 36.6 (03 Jul 2018 14:57)  T(F): 97.9 (04 Jul 2018 05:18), Max: 97.9 (03 Jul 2018 14:57)  HR: 71 (04 Jul 2018 05:18) (63 - 71)  BP: 128/54 (04 Jul 2018 05:18) (124/59 - 130/70)  BP(mean): --  RR: 18 (04 Jul 2018 05:18) (17 - 18)  SpO2: 100% (04 Jul 2018 05:18) (100% - 100%)    General: pt awake, nonverbal, unable to follow commands   Karnofsky Performance Score/Palliative Performance Status Version2:     30%    HEENT: normal    Lungs: comfortable   CV: normal    GI:  incontinent  :   incontinent    Musculoskeletal: nonambulatory, assist with ADLs  Neuro:  cognitive impairment dysphagia  Diet: pureed/honey    LABS:                        12.7   6.4   )-----------( 191      ( 03 Jul 2018 06:17 )             39.6     07-03    147<H>  |  113<H>  |  14  ----------------------------<  89  3.6   |  26  |  0.70    Ca    8.7      03 Jul 2018 06:17  Phos  2.8     07-03  Mg     2.4     07-03          RADIOLOGY & ADDITIONAL STUDIES: reviewed    ADVANCE DIRECTIVES:  Full code  Advanced Care Planning discussion total time spent: HPI:  68 y/o M pt with PMHx of Blindness of the R Eye, CVA (non-verbal at baseline), HTN, HLD, and Parkinson's Disease sent for HHA who is only with him for past few days and now currently  not at the bedside  for cough with sputum production  and associated with mild shortness of breath. Request to discuss goals of care ie PEG placement. Full code on file.     PAST MEDICAL & SURGICAL HISTORY:  HLD (hyperlipidemia)  HTN (hypertension)  Parkinson's disease  Blindness of right eye  CVA (cerebral vascular accident)  History of laminectomy    SOCIAL HISTORY:    Admitted from:  home, [facility]  Preferred Language: english    Surrogate/HCP/Guardian: Britt Chadwick (niece/surrogate): [phone]    FAMILY HISTORY:  No pertinent family history in first degree relatives    Baseline ADLs (prior to admission): nonverbal, assist with ADLs    No Known Allergies    Present Symptoms:      Review of Systems:  Unable to obtain due to poor mentation    MEDICATIONS  (STANDING):  amLODIPine   Tablet 5 milliGRAM(s) Oral daily  artificial  tears Solution 1 Drop(s) Both EYES two times a day  aspirin  chewable 81 milliGRAM(s) Oral daily  atorvastatin 40 milliGRAM(s) Oral at bedtime  carbidopa/levodopa  25/100 1 Tablet(s) Oral <User Schedule>  carbidopa/levodopa  25/100 1.5 Tablet(s) Oral <User Schedule>  clopidogrel Tablet 75 milliGRAM(s) Oral daily  docusate sodium 100 milliGRAM(s) Oral two times a day  memantine 5 milliGRAM(s) Oral at bedtime  pantoprazole    Tablet 40 milliGRAM(s) Oral before breakfast  polyethylene glycol 3350 17 Gram(s) Oral daily  senna 2 Tablet(s) Oral at bedtime  traZODone 50 milliGRAM(s) Oral daily    MEDICATIONS  (PRN):      PHYSICAL EXAM:    Vital Signs Last 24 Hrs  T(C): 36.6 (04 Jul 2018 05:18), Max: 36.6 (03 Jul 2018 14:57)  T(F): 97.9 (04 Jul 2018 05:18), Max: 97.9 (03 Jul 2018 14:57)  HR: 71 (04 Jul 2018 05:18) (63 - 71)  BP: 128/54 (04 Jul 2018 05:18) (124/59 - 130/70)  BP(mean): --  RR: 18 (04 Jul 2018 05:18) (17 - 18)  SpO2: 100% (04 Jul 2018 05:18) (100% - 100%)    General: pt awake, nonverbal, unable to follow commands   Karnofsky Performance Score/Palliative Performance Status Version2:     30%    HEENT: normal    Lungs: comfortable   CV: normal    GI:  incontinent  :   incontinent    Musculoskeletal: nonambulatory, assist with ADLs  Neuro:  cognitive impairment dysphagia  Diet: pureed/honey    LABS:                        12.7   6.4   )-----------( 191      ( 03 Jul 2018 06:17 )             39.6     07-03    147<H>  |  113<H>  |  14  ----------------------------<  89  3.6   |  26  |  0.70    Ca    8.7      03 Jul 2018 06:17  Phos  2.8     07-03  Mg     2.4     07-03          RADIOLOGY & ADDITIONAL STUDIES: reviewed    ADVANCE DIRECTIVES:  Full code  Advanced Care Planning discussion total time spent:

## 2018-07-04 NOTE — CONSULT NOTE ADULT - PROBLEM SELECTOR RECOMMENDATION 2
2/2 PD/dementia. ST recommending pureed/honey diet. 2/2 PD/dementia. Swallow re-evaluation completed - recommended pureed/honey by spoon. LM for patient's surrogate/niece to further discuss goals of care ie artificial nutrition.

## 2018-07-04 NOTE — PROGRESS NOTE ADULT - ASSESSMENT
68 y/o M pt with PMHx of Blindness of the R Eye, CVA (non-verbal at baseline), HTN, HLD, and Parkinson's Disease sent for HHA{ who is only with him for past few days and now currently  not at the bedside } for cough with sputum production  and associated with mild shortness of breath .

## 2018-07-04 NOTE — PROGRESS NOTE ADULT - ASSESSMENT
68 y/o M pt with PMHx of Blindness of the R Eye, CVA (non-verbal at baseline), HTN, HLD, and Parkinson's Disease sent for HHA{ who is only with him for past few days and now currently  not at the bedside } for cough with sputum production  and associated with mild shortness of breath .    will admit the patient for   1]shortness of breath and concern for cough   2]parkinsons disease   3}cva   4}need for prophylactic measure      patient had cough on food intake   swallow evaluation was done and NPO recommended except medications. meds are given in apple sauce.  PT evaluation ordered   neurology consultation was asked for optimising patients parkinson disease medications to improve functionality .Dr haro saw the patient and optimised patients parkinson medications   -swallow re-evaluation was done and patients diet was changed to pureed and honey thick liquids by spoon

## 2018-07-04 NOTE — PROGRESS NOTE ADULT - ASSESSMENT
A 66 yo Male with multiple co-morbidities including Blindness of the Right Eye,  Parkinson's Disease, who was recently discharged from [facility] after treated for constipation, and now sent in to the ER by A for evaluation of productive cough  and mild shortness of breath. He has no fever or chills, no nausea or  vomiting. The CXR shows  increasing interstitial markings left base. He has started on Ceftriaxone and Flagyl, and the ID consult requested to assist with further evaluation and antibiotic management.     # Suspected Aspiration pneumonia  # Procalcitonin level is low     Would recommend:  1. Monitor oFF antibiotics    2. Aspiration precaution  3. OOb to chair    d/w Nursing staff

## 2018-07-04 NOTE — CONSULT NOTE ADULT - PROBLEM SELECTOR PROBLEM 1
Parkinson's disease
Aspiration pneumonia
Dementia due to Parkinson's disease without behavioral disturbance

## 2018-07-05 LAB
ANION GAP SERPL CALC-SCNC: 7 MMOL/L — SIGNIFICANT CHANGE UP (ref 5–17)
BUN SERPL-MCNC: 10 MG/DL — SIGNIFICANT CHANGE UP (ref 7–18)
CALCIUM SERPL-MCNC: 8.5 MG/DL — SIGNIFICANT CHANGE UP (ref 8.4–10.5)
CHLORIDE SERPL-SCNC: 113 MMOL/L — HIGH (ref 96–108)
CO2 SERPL-SCNC: 28 MMOL/L — SIGNIFICANT CHANGE UP (ref 22–31)
CREAT SERPL-MCNC: 0.7 MG/DL — SIGNIFICANT CHANGE UP (ref 0.5–1.3)
GLUCOSE SERPL-MCNC: 85 MG/DL — SIGNIFICANT CHANGE UP (ref 70–99)
HCT VFR BLD CALC: 38.5 % — LOW (ref 39–50)
HGB BLD-MCNC: 12.6 G/DL — LOW (ref 13–17)
MAGNESIUM SERPL-MCNC: 2.1 MG/DL — SIGNIFICANT CHANGE UP (ref 1.6–2.6)
MCHC RBC-ENTMCNC: 30.2 PG — SIGNIFICANT CHANGE UP (ref 27–34)
MCHC RBC-ENTMCNC: 32.8 GM/DL — SIGNIFICANT CHANGE UP (ref 32–36)
MCV RBC AUTO: 92 FL — SIGNIFICANT CHANGE UP (ref 80–100)
PHOSPHATE SERPL-MCNC: 3.1 MG/DL — SIGNIFICANT CHANGE UP (ref 2.5–4.5)
PLATELET # BLD AUTO: 170 K/UL — SIGNIFICANT CHANGE UP (ref 150–400)
POTASSIUM SERPL-MCNC: 3.7 MMOL/L — SIGNIFICANT CHANGE UP (ref 3.5–5.3)
POTASSIUM SERPL-SCNC: 3.7 MMOL/L — SIGNIFICANT CHANGE UP (ref 3.5–5.3)
RBC # BLD: 4.18 M/UL — LOW (ref 4.2–5.8)
RBC # FLD: 13 % — SIGNIFICANT CHANGE UP (ref 10.3–14.5)
SODIUM SERPL-SCNC: 148 MMOL/L — HIGH (ref 135–145)
WBC # BLD: 6.8 K/UL — SIGNIFICANT CHANGE UP (ref 3.8–10.5)
WBC # FLD AUTO: 6.8 K/UL — SIGNIFICANT CHANGE UP (ref 3.8–10.5)

## 2018-07-05 RX ADMIN — SENNA PLUS 2 TABLET(S): 8.6 TABLET ORAL at 22:56

## 2018-07-05 RX ADMIN — CARBIDOPA AND LEVODOPA 1 TABLET(S): 25; 100 TABLET ORAL at 12:45

## 2018-07-05 RX ADMIN — AMLODIPINE BESYLATE 5 MILLIGRAM(S): 2.5 TABLET ORAL at 06:42

## 2018-07-05 RX ADMIN — PANTOPRAZOLE SODIUM 40 MILLIGRAM(S): 20 TABLET, DELAYED RELEASE ORAL at 06:42

## 2018-07-05 RX ADMIN — Medication 100 MILLIGRAM(S): at 06:42

## 2018-07-05 RX ADMIN — POLYETHYLENE GLYCOL 3350 17 GRAM(S): 17 POWDER, FOR SOLUTION ORAL at 12:46

## 2018-07-05 RX ADMIN — ATORVASTATIN CALCIUM 40 MILLIGRAM(S): 80 TABLET, FILM COATED ORAL at 22:56

## 2018-07-05 RX ADMIN — Medication 1 DROP(S): at 06:42

## 2018-07-05 RX ADMIN — CARBIDOPA AND LEVODOPA 1.5 TABLET(S): 25; 100 TABLET ORAL at 15:27

## 2018-07-05 RX ADMIN — Medication 81 MILLIGRAM(S): at 12:45

## 2018-07-05 RX ADMIN — CARBIDOPA AND LEVODOPA 1 TABLET(S): 25; 100 TABLET ORAL at 18:06

## 2018-07-05 RX ADMIN — Medication 50 MILLIGRAM(S): at 12:46

## 2018-07-05 RX ADMIN — CLOPIDOGREL BISULFATE 75 MILLIGRAM(S): 75 TABLET, FILM COATED ORAL at 12:45

## 2018-07-05 RX ADMIN — CARBIDOPA AND LEVODOPA 1.5 TABLET(S): 25; 100 TABLET ORAL at 06:42

## 2018-07-05 RX ADMIN — MEMANTINE HYDROCHLORIDE 5 MILLIGRAM(S): 10 TABLET ORAL at 22:56

## 2018-07-05 RX ADMIN — Medication 1 DROP(S): at 18:06

## 2018-07-05 NOTE — PROGRESS NOTE ADULT - SUBJECTIVE AND OBJECTIVE BOX
Patient is seen and examined at the bed side, is afebrile. He has no new events.      REVIEW OF SYSTEMS: Unable to obtain due to Mental status      ALLERGIES: NKDA        Vital Signs Last 24 Hrs  T(C): 36.7 (05 Jul 2018 14:54), Max: 36.7 (05 Jul 2018 14:54)  T(F): 98 (05 Jul 2018 14:54), Max: 98 (05 Jul 2018 14:54)  HR: 72 (05 Jul 2018 15:16) (67 - 72)  BP: 131/65 (05 Jul 2018 15:16) (119/54 - 131/65)  BP(mean): --  RR: 18 (05 Jul 2018 14:54) (18 - 18)  SpO2: 100% (05 Jul 2018 15:16) (100% - 100%)        PHYSICAL EXAM:  GENERAL: Not in distress  CHEST/LUNG: Air entry  bilaterally  HEART: s1 and s2 present  ABDOMEN: Nontender, Nondistended  EXTREMITIES:  No pedal  edema  CNS: Awake and alert          LABS:                          12.6   6.8   )-----------( 170      ( 05 Jul 2018 07:00 )             38.5                12.7   6.4   )-----------( 191      ( 03 Jul 2018 06:17 )             39.6       07-05    148<H>  |  113<H>  |  10  ----------------------------<  85  3.7   |  28  |  0.70    Ca    8.5      05 Jul 2018 07:00  Phos  3.1     07-05  Mg     2.1     07-05      07-03    147<H>  |  113<H>  |  14  ----------------------------<  89  3.6   |  26  |  0.70    Ca    8.7      03 Jul 2018 06:17  Phos  2.8     07-03  Mg     2.4     07-03        MEDICATIONS  (STANDING):  amLODIPine   Tablet 5 milliGRAM(s) Oral daily  artificial  tears Solution 1 Drop(s) Both EYES two times a day  aspirin  chewable 81 milliGRAM(s) Oral daily  atorvastatin 40 milliGRAM(s) Oral at bedtime  carbidopa/levodopa  25/100 1 Tablet(s) Oral <User Schedule>  carbidopa/levodopa  25/100 1.5 Tablet(s) Oral <User Schedule>  clopidogrel Tablet 75 milliGRAM(s) Oral daily  docusate sodium 100 milliGRAM(s) Oral two times a day  memantine 5 milliGRAM(s) Oral at bedtime  pantoprazole    Tablet 40 milliGRAM(s) Oral before breakfast  polyethylene glycol 3350 17 Gram(s) Oral daily  senna 2 Tablet(s) Oral at bedtime  traZODone 50 milliGRAM(s) Oral daily    MEDICATIONS  (PRN):      RADIOLOGY & ADDITIONAL TESTS:    6/30/18: Xray Chest 1 View AP/PA (06.30.18 @ 18:06) : Slightly increasing interstitial markings left base.       MICROBIOLOGY DATA:      Rapid Respiratory Viral Panel (07.01.18 @ 22:02)    Rapid RVP Result: [facility]: The FilmArray RVP Rapid uses polymerase chain reaction (PCR) and melt  curve analysis to screen for adenovirus; coronavirus HKU1, NL63, 229E,  OC43; human metapneumovirus (hMPV); human enterovirus/rhinovirus  (Entero/RV); influenza A; influenza A/H1;influenza A/H3; influenza  A/H1-2009; influenza B; parainfluenza viruses 1, 2, 3, 4; respiratory  syncytial virus; Bordetella pertussis; Mycoplasma pneumoniae; and  Chlamydophila pneumoniae.    Culture - Blood (07.01.18 @ 00:12)    Specimen Source: .Blood Blood    Culture Results:   No growth to date.    Culture - Blood (07.01.18 @ 00:12)    Specimen Source: .Blood Blood    Culture Results:   No growth to date.

## 2018-07-05 NOTE — PROGRESS NOTE ADULT - ASSESSMENT
1. Poor po intake  2. Failure to thrive    Suggestion:    1. Calorie count  2. Nutrition evaluation  3. Assistant with feeding  4. Avoid NSAID  5. DVT prophylaxis

## 2018-07-05 NOTE — PROGRESS NOTE ADULT - PROBLEM SELECTOR PLAN 1
patient has no fevers and no leucocyte count   - procalcitonin 0.04  stable no fever no wbc cxr zhane

## 2018-07-05 NOTE — PROGRESS NOTE ADULT - SUBJECTIVE AND OBJECTIVE BOX
Patient was seen and examined  Patient is a 67y old  Male who presents with a chief complaint of shortness of breath (30 Jun 2018 22:35)      INTERVAL HPI/OVERNIGHT EVENTS:  T(C): 36.7 (07-05-18 @ 14:54), Max: 36.7 (07-04-18 @ 21:40)  HR: 72 (07-05-18 @ 15:16) (64 - 72)  BP: 131/65 (07-05-18 @ 15:16) (119/54 - 131/65)  RR: 18 (07-05-18 @ 14:54) (16 - 18)  SpO2: 100% (07-05-18 @ 15:16) (99% - 100%)  Wt(kg): --  I&O's Summary      LABS:                        12.6   6.8   )-----------( 170      ( 05 Jul 2018 07:00 )             38.5     07-05    148<H>  |  113<H>  |  10  ----------------------------<  85  3.7   |  28  |  0.70    Ca    8.5      05 Jul 2018 07:00  Phos  3.1     07-05  Mg     2.1     07-05      MEDICATIONS  (STANDING):  amLODIPine   Tablet 5 milliGRAM(s) Oral daily  artificial  tears Solution 1 Drop(s) Both EYES two times a day  aspirin  chewable 81 milliGRAM(s) Oral daily  atorvastatin 40 milliGRAM(s) Oral at bedtime  carbidopa/levodopa  25/100 1 Tablet(s) Oral <User Schedule>  carbidopa/levodopa  25/100 1.5 Tablet(s) Oral <User Schedule>  clopidogrel Tablet 75 milliGRAM(s) Oral daily  docusate sodium 100 milliGRAM(s) Oral two times a day  memantine 5 milliGRAM(s) Oral at bedtime  pantoprazole    Tablet 40 milliGRAM(s) Oral before breakfast  polyethylene glycol 3350 17 Gram(s) Oral daily  senna 2 Tablet(s) Oral at bedtime  traZODone 50 milliGRAM(s) Oral daily    MEDICATIONS  (PRN):      RADIOLOGY & ADDITIONAL TESTS:    Imaging Personally Reviewed:  [ ] YES  [ ] NO    REVIEW OF SYSTEMS:  CONSTITUTIONAL: No fever, weight loss, or fatigue  EYES: No eye pain, visual disturbances, or discharge  ENMT:  No difficulty hearing, tinnitus, vertigo; No sinus or throat pain  NECK: No pain or stiffness  BREASTS: No pain, masses, or nipple discharge  RESPIRATORY: No cough, wheezing, chills or hemoptysis; No shortness of breath  CARDIOVASCULAR: No chest pain, palpitations, dizziness, or leg swelling  GASTROINTESTINAL: No abdominal or epigastric pain. No nausea, vomiting, or hematemesis; No diarrhea or constipation. No melena or hematochezia.  GENITOURINARY: No dysuria, frequency, hematuria, or incontinence  NEUROLOGICAL: No headaches, memory loss, loss of strength, numbness, or tremors  SKIN: No itching, burning, rashes, or lesions   LYMPH NODES: No enlarged glands  ENDOCRINE: No heat or cold intolerance; No hair loss  MUSCULOSKELETAL: No joint pain or swelling; No muscle, back, or extremity pain  PSYCHIATRIC: No depression, anxiety, mood swings, or difficulty sleeping  HEME/LYMPH: No easy bruising, or bleeding gums  ALLERY AND IMMUNOLOGIC: No hives or eczema      Consultant(s) Notes Reviewed:  [ x ] YES  [ ] NO    PHYSICAL EXAM:  GENERAL: NAD, well-groomed, well-developed  HEAD:  Atraumatic, Normocephalic  EYES: EOMI, PERRLA, conjunctiva and sclera clear  ENMT: No tonsillar erythema, exudates, or enlargement; Moist mucous membranes, Good dentition, No lesions  NECK: Supple, No JVD, Normal thyroid  NERVOUS SYSTEM:  Alert & Oriented X3, Good concentration; Motor Strength 5/5 B/L upper and lower extremities; DTRs 2+ intact and symmetric  CHEST/LUNG: Clear to percussion bilaterally; No rales, rhonchi, wheezing, or rubs  HEART: Regular rate and rhythm; No murmurs, rubs, or gallops  ABDOMEN: Soft, Nontender, Nondistended; Bowel sounds present  EXTREMITIES:  2+ Peripheral Pulses, No clubbing, cyanosis, or edema  LYMPH: No lymphadenopathy noted  SKIN: No rashes or lesions    Care Discussed with Consultants/Other Providers [ x] YES  [ ] NO

## 2018-07-05 NOTE — PROGRESS NOTE ADULT - ASSESSMENT
66 y/o M pt with PMHx of Blindness of the R Eye, CVA (non-verbal at baseline), HTN, HLD, and Parkinson's Disease sent for HHA{ who is only with him for past few days and now currently  not at the bedside } for cough with sputum production  and associated with mild shortness of breath .

## 2018-07-05 NOTE — PROGRESS NOTE ADULT - PROBLEM SELECTOR PLAN 1
Check pending cultures  antibiotics  follow up CXR  oxygen supp  Bronchodilators. Aspiration precautions  antibiotics  follow up CXR  oxygen supp  Bronchodilators.

## 2018-07-05 NOTE — PROGRESS NOTE ADULT - PROBLEM SELECTOR PLAN 1
patient has no fevers and no leucocyte count   procalcitonin 0.04  cxr : clear   patient passed swallow re-evaluation

## 2018-07-05 NOTE — PROGRESS NOTE ADULT - SUBJECTIVE AND OBJECTIVE BOX
PGY 1 Note discussed with supervising resident and primary attending    Patient is a 67y old  Male who presents with a chief complaint of shortness of breath (30 Jun 2018 22:35)      INTERVAL HPI/OVERNIGHT EVENTS: offers no new complaints; patient is stable, accepting pureed diet well    MEDICATIONS  (STANDING):  amLODIPine   Tablet 5 milliGRAM(s) Oral daily  artificial  tears Solution 1 Drop(s) Both EYES two times a day  aspirin  chewable 81 milliGRAM(s) Oral daily  atorvastatin 40 milliGRAM(s) Oral at bedtime  carbidopa/levodopa  25/100 1 Tablet(s) Oral <User Schedule>  carbidopa/levodopa  25/100 1.5 Tablet(s) Oral <User Schedule>  clopidogrel Tablet 75 milliGRAM(s) Oral daily  docusate sodium 100 milliGRAM(s) Oral two times a day  memantine 5 milliGRAM(s) Oral at bedtime  pantoprazole    Tablet 40 milliGRAM(s) Oral before breakfast  polyethylene glycol 3350 17 Gram(s) Oral daily  senna 2 Tablet(s) Oral at bedtime  traZODone 50 milliGRAM(s) Oral daily    MEDICATIONS  (PRN):    REVIEW OF SYSTEMS:    CONSTITUTIONAL: No fever,   EYES: blind  right eye   NECK: No pain or stiffness  RESPIRATORY: No cough; No shortness of breath  CARDIOVASCULAR: No chest pain, no palpitations  GASTROINTESTINAL: No pain. No nausea or vomiting; No diarrhea, decreased swallowing   NEUROLOGICAL: No headache or numbness, tremors+, decreased movements, non verbel  MUSCULOSKELETAL: No joint pain, no muscle pain,   GENITOURINARY: no dysuria, no frequency, no hesitancy  PSYCHIATRY: no depression , no anxiety, parkinson disease+  ALL OTHER  ROS negative        Vital Signs Last 24 Hrs  T(C): 36.7 (05 Jul 2018 14:54), Max: 36.7 (04 Jul 2018 21:40)  T(F): 98 (05 Jul 2018 14:54), Max: 98.1 (04 Jul 2018 21:40)  HR: 72 (05 Jul 2018 15:16) (64 - 72)  BP: 131/65 (05 Jul 2018 15:16) (119/54 - 131/65)  BP(mean): --  RR: 18 (05 Jul 2018 14:54) (16 - 18)  SpO2: 100% (05 Jul 2018 15:16) (99% - 100%)    PHYSICAL EXAM:  GENERAL: NAD  HEENT: Normocephalic;  conjunctivae and sclerae clear; moist mucous membranes; decreased swallow  NECK : supple  CHEST/LUNG: Clear to auscultation bilaterally with good air entry   HEART: S1 S2  regular; no murmurs, gallops or rubs  ABDOMEN: Soft, Nontender, Nondistended; Bowel sounds present  EXTREMITIES: no cyanosis; no edema; no calf tenderness. tremors+, decreased ROM, bradykinesia  SKIN: warm and dry; no rash  NERVOUS SYSTEM:  Awake but non verbal ; no new deficits    _________________________________________________  LABS                                    12.6   6.8   )-----------( 170      ( 05 Jul 2018 07:00 )             38.5   07-05    148<H>  |  113<H>  |  10  ----------------------------<  85  3.7   |  28  |  0.70    Ca    8.5      05 Jul 2018 07:00  Phos  3.1     07-05  Mg     2.1     07-05    Care Discussed with Consultants :     Plan of care was discussed with patient and /or primary care giver; all questions and concerns were addressed and care was aligned with patient's wishes.

## 2018-07-05 NOTE — CHART NOTE - NSCHARTNOTEFT_GEN_A_CORE
Spoke with patient's guardian (Darcy Chadwick: [phone] / [phone] x 0) and  (Salena Alvarez) on the phone. Discussed status. Aware pt passed swallow re-evaluation. Reports she does not have medical decision making power - agreeable to 2-physician MOLST as appropriate. Requests to be kept up to date regarding patient's status, discharge planning, etc. Placing copy of guardianship document on chart. Dr. Gonsalez aware.

## 2018-07-05 NOTE — PROGRESS NOTE ADULT - ASSESSMENT
patient is calm and stable on bed. patient accepting pureed - honey thick liquids well , swallowing improved. Nurse practitioner Spoke with patient's guardian (Darcy Chadwick: [phone] / [phone] x [phone]) and  (Salena Alvarez) on the phone. Discussed status. Aware pt passed swallow re-evaluation. Reports she does not have medical decision making power - agreeable to 2-physician MOLST as appropriate.

## 2018-07-05 NOTE — PROGRESS NOTE ADULT - ASSESSMENT
A 68 yo Male with multiple co-morbidities including Blindness of the Right Eye,  Parkinson's Disease, who was recently discharged from [facility] after treated for constipation, and now sent in to the ER by A for evaluation of productive cough  and mild shortness of breath. He has no fever or chills, no nausea or  vomiting. The CXR shows  increasing interstitial markings left base. He has started on Ceftriaxone and Flagyl, and the ID consult requested to assist with further evaluation and antibiotic management.     # Suspected Aspiration pneumonia  # Procalcitonin level is low     Would recommend:  1. Monitor oFF antibiotics    2. Aspiration precaution  3. OOb to chair    d/w Nursing staff

## 2018-07-05 NOTE — PROGRESS NOTE ADULT - SUBJECTIVE AND OBJECTIVE BOX
[   ] ICU                                          [   ] CCU                                      [  X ] Medical Floor    Patient is comfortable. No new complaints reported, No abdominal pain, N/V, hematemesis, hematochezia, melena, fever, chills, chest pain, SOB, cough or diarrhea reported.    VITALS  Vital Signs Last 24 Hrs  T(C): 36.7 (05 Jul 2018 14:54), Max: 36.7 (04 Jul 2018 21:40)  T(F): 98 (05 Jul 2018 14:54), Max: 98.1 (04 Jul 2018 21:40)  HR: 72 (05 Jul 2018 15:16) (64 - 72)  BP: 131/65 (05 Jul 2018 15:16) (119/54 - 131/65)     RR: 18 (05 Jul 2018 14:54) (16 - 18)  SpO2: 100% (05 Jul 2018 15:16) (99% - 100%)       MEDICATIONS  (STANDING):  amLODIPine   Tablet 5 milliGRAM(s) Oral daily  artificial  tears Solution 1 Drop(s) Both EYES two times a day  aspirin  chewable 81 milliGRAM(s) Oral daily  atorvastatin 40 milliGRAM(s) Oral at bedtime  carbidopa/levodopa  25/100 1 Tablet(s) Oral <User Schedule>  carbidopa/levodopa  25/100 1.5 Tablet(s) Oral <User Schedule>  clopidogrel Tablet 75 milliGRAM(s) Oral daily  docusate sodium 100 milliGRAM(s) Oral two times a day  memantine 5 milliGRAM(s) Oral at bedtime  pantoprazole    Tablet 40 milliGRAM(s) Oral before breakfast  polyethylene glycol 3350 17 Gram(s) Oral daily  senna 2 Tablet(s) Oral at bedtime  traZODone 50 milliGRAM(s) Oral daily    MEDICATIONS  (PRN):                            12.6   6.8   )-----------( 170      ( 05 Jul 2018 07:00 )             38.5       07-05    148<H>  |  113<H>  |  10  ----------------------------<  85  3.7   |  28  |  0.70    Ca    8.5      05 Jul 2018 07:00  Phos  3.1     07-05  Mg     2.1     07-05

## 2018-07-05 NOTE — PROGRESS NOTE ADULT - SUBJECTIVE AND OBJECTIVE BOX
Patient is a 67y old  Male who presents with a chief complaint of shortness of breath (30 Jun 2018 22:35)    Awake , alert ,  lying in bed , aphasic . Pt has no resp distress. No new complaints.    INTERVAL HPI/OVERNIGHT EVENTS:      VITAL SIGNS:  T(F): 97.6 (07-05-18 @ 05:17)  HR: 67 (07-05-18 @ 05:17)  BP: 119/54 (07-05-18 @ 05:17)  RR: 18 (07-05-18 @ 05:17)  SpO2: 100% (07-05-18 @ 05:17)  Wt(kg): --  I&O's Detail          REVIEW OF SYSTEMS:    CONSTITUTIONAL:  No fevers, chills, sweats    HEENT:  Eyes:  No diplopia or blurred vision. ENT:  No earache, sore throat or runny nose.    CARDIOVASCULAR:  No pressure, squeezing, tightness, or heaviness about the chest; no palpitations.    RESPIRATORY:  Per HPI    GASTROINTESTINAL:  No abdominal pain, nausea, vomiting or diarrhea.    GENITOURINARY:  No dysuria, frequency or urgency.    NEUROLOGIC:  No paresthesias, fasciculations, seizures or weakness.    PSYCHIATRIC:  No disorder of thought or mood.      PHYSICAL EXAM:    Constitutional: Well developed and nourished  Eyes:Perrla  ENMT: normal  Neck:supple  Respiratory: CTA b/l  Cardiovascular: S1 S2 regular  Gastrointestinal: Soft, Non tender  Extremities: No edema  Vascular:normal  Neurological:Awake, alert,Ox3  Musculoskeletal:Normal      MEDICATIONS  (STANDING):  amLODIPine   Tablet 5 milliGRAM(s) Oral daily  artificial  tears Solution 1 Drop(s) Both EYES two times a day  aspirin  chewable 81 milliGRAM(s) Oral daily  atorvastatin 40 milliGRAM(s) Oral at bedtime  carbidopa/levodopa  25/100 1 Tablet(s) Oral <User Schedule>  carbidopa/levodopa  25/100 1.5 Tablet(s) Oral <User Schedule>  clopidogrel Tablet 75 milliGRAM(s) Oral daily  docusate sodium 100 milliGRAM(s) Oral two times a day  memantine 5 milliGRAM(s) Oral at bedtime  pantoprazole    Tablet 40 milliGRAM(s) Oral before breakfast  polyethylene glycol 3350 17 Gram(s) Oral daily  senna 2 Tablet(s) Oral at bedtime  traZODone 50 milliGRAM(s) Oral daily    MEDICATIONS  (PRN):      Allergies    No Known Allergies    Intolerances        LABS:                        12.6   6.8   )-----------( 170      ( 05 Jul 2018 07:00 )             38.5     07-05    148<H>  |  113<H>  |  10  ----------------------------<  85  3.7   |  28  |  0.70    Ca    8.5      05 Jul 2018 07:00  Phos  3.1     07-05  Mg     2.1     07-05                CAPILLARY BLOOD GLUCOSE        pro-bnp 74 06-30 @ 17:49     d-dimer --  06-30 @ 17:49      RADIOLOGY & ADDITIONAL TESTS:    CXR:  < from: Xray Chest 1 View AP/PA (06.30.18 @ 18:06) >    IMPRESSION: Slightly increasing interstitial markings left base. Other   findings stable as above.    < end of copied text >    Ct scan chest:  < from: CT Abdomen and Pelvis w/ IV Cont (06.05.18 @ 18:37) >  IMPRESSION: No acute pathology. Cholelithiasis  Large amount of stool throughout the colon may indicate constipation  Mild superior compression fracture of L1    < end of copied text >    ekg;    echo: Patient is a 67y old  Male who presents with a chief complaint of shortness of breath (30 Jun 2018 22:35)  Awake , alert ,  lying in bed , aphasic . Pt has no resp distress. No new complaints.    INTERVAL HPI/OVERNIGHT EVENTS:      VITAL SIGNS:  T(F): 97.6 (07-05-18 @ 05:17)  HR: 67 (07-05-18 @ 05:17)  BP: 119/54 (07-05-18 @ 05:17)  RR: 18 (07-05-18 @ 05:17)  SpO2: 100% (07-05-18 @ 05:17)  Wt(kg): --  I&O's Detail          REVIEW OF SYSTEMS:    CONSTITUTIONAL:  No fevers, chills, sweats    HEENT:  Eyes:  No diplopia or blurred vision. ENT:  No earache, sore throat or runny nose.    CARDIOVASCULAR:  No pressure, squeezing, tightness, or heaviness about the chest; no palpitations.    RESPIRATORY:  Per HPI    GASTROINTESTINAL:  No abdominal pain, nausea, vomiting or diarrhea.    GENITOURINARY:  No dysuria, frequency or urgency.    NEUROLOGIC:  No paresthesias, fasciculations, seizures or weakness.    PSYCHIATRIC:  No disorder of thought or mood.      PHYSICAL EXAM:    Constitutional: Well developed and nourished  Eyes:Perrla  ENMT: normal  Neck:supple  Respiratory: CTA b/l  Cardiovascular: S1 S2 regular  Gastrointestinal: Soft, Non tender  Extremities: No edema  Vascular:normal  Neurological:Awake, alert,Ox3  Musculoskeletal:Normal      MEDICATIONS  (STANDING):  amLODIPine   Tablet 5 milliGRAM(s) Oral daily  artificial  tears Solution 1 Drop(s) Both EYES two times a day  aspirin  chewable 81 milliGRAM(s) Oral daily  atorvastatin 40 milliGRAM(s) Oral at bedtime  carbidopa/levodopa  25/100 1 Tablet(s) Oral <User Schedule>  carbidopa/levodopa  25/100 1.5 Tablet(s) Oral <User Schedule>  clopidogrel Tablet 75 milliGRAM(s) Oral daily  docusate sodium 100 milliGRAM(s) Oral two times a day  memantine 5 milliGRAM(s) Oral at bedtime  pantoprazole    Tablet 40 milliGRAM(s) Oral before breakfast  polyethylene glycol 3350 17 Gram(s) Oral daily  senna 2 Tablet(s) Oral at bedtime  traZODone 50 milliGRAM(s) Oral daily    MEDICATIONS  (PRN):      Allergies    No Known Allergies    Intolerances        LABS:                        12.6   6.8   )-----------( 170      ( 05 Jul 2018 07:00 )             38.5     07-05    148<H>  |  113<H>  |  10  ----------------------------<  85  3.7   |  28  |  0.70    Ca    8.5      05 Jul 2018 07:00  Phos  3.1     07-05  Mg     2.1     07-05                CAPILLARY BLOOD GLUCOSE        pro-bnp 74 06-30 @ 17:49     d-dimer --  06-30 @ 17:49      RADIOLOGY & ADDITIONAL TESTS:    CXR:  < from: Xray Chest 1 View AP/PA (06.30.18 @ 18:06) >    IMPRESSION: Slightly increasing interstitial markings left base. Other   findings stable as above.    < end of copied text >    Ct scan chest:  < from: CT Abdomen and Pelvis w/ IV Cont (06.05.18 @ 18:37) >  IMPRESSION: No acute pathology. Cholelithiasis  Large amount of stool throughout the colon may indicate constipation  Mild superior compression fracture of L1    < end of copied text >    ekg;    echo:

## 2018-07-06 LAB
ANION GAP SERPL CALC-SCNC: 9 MMOL/L — SIGNIFICANT CHANGE UP (ref 5–17)
BUN SERPL-MCNC: 12 MG/DL — SIGNIFICANT CHANGE UP (ref 7–18)
CALCIUM SERPL-MCNC: 8.9 MG/DL — SIGNIFICANT CHANGE UP (ref 8.4–10.5)
CHLORIDE SERPL-SCNC: 111 MMOL/L — HIGH (ref 96–108)
CO2 SERPL-SCNC: 27 MMOL/L — SIGNIFICANT CHANGE UP (ref 22–31)
CREAT SERPL-MCNC: 0.73 MG/DL — SIGNIFICANT CHANGE UP (ref 0.5–1.3)
CULTURE RESULTS: SIGNIFICANT CHANGE UP
CULTURE RESULTS: SIGNIFICANT CHANGE UP
GLUCOSE SERPL-MCNC: 78 MG/DL — SIGNIFICANT CHANGE UP (ref 70–99)
POTASSIUM SERPL-MCNC: 3.6 MMOL/L — SIGNIFICANT CHANGE UP (ref 3.5–5.3)
POTASSIUM SERPL-SCNC: 3.6 MMOL/L — SIGNIFICANT CHANGE UP (ref 3.5–5.3)
SODIUM SERPL-SCNC: 147 MMOL/L — HIGH (ref 135–145)
SPECIMEN SOURCE: SIGNIFICANT CHANGE UP
SPECIMEN SOURCE: SIGNIFICANT CHANGE UP

## 2018-07-06 RX ADMIN — ATORVASTATIN CALCIUM 40 MILLIGRAM(S): 80 TABLET, FILM COATED ORAL at 22:22

## 2018-07-06 RX ADMIN — CARBIDOPA AND LEVODOPA 1.5 TABLET(S): 25; 100 TABLET ORAL at 06:07

## 2018-07-06 RX ADMIN — CARBIDOPA AND LEVODOPA 1 TABLET(S): 25; 100 TABLET ORAL at 13:29

## 2018-07-06 RX ADMIN — Medication 1 DROP(S): at 06:06

## 2018-07-06 RX ADMIN — SENNA PLUS 2 TABLET(S): 8.6 TABLET ORAL at 22:22

## 2018-07-06 RX ADMIN — CARBIDOPA AND LEVODOPA 1.5 TABLET(S): 25; 100 TABLET ORAL at 15:18

## 2018-07-06 RX ADMIN — CARBIDOPA AND LEVODOPA 1 TABLET(S): 25; 100 TABLET ORAL at 18:53

## 2018-07-06 RX ADMIN — POLYETHYLENE GLYCOL 3350 17 GRAM(S): 17 POWDER, FOR SOLUTION ORAL at 13:29

## 2018-07-06 RX ADMIN — Medication 50 MILLIGRAM(S): at 13:29

## 2018-07-06 RX ADMIN — Medication 81 MILLIGRAM(S): at 13:29

## 2018-07-06 RX ADMIN — Medication 1 DROP(S): at 17:42

## 2018-07-06 RX ADMIN — CLOPIDOGREL BISULFATE 75 MILLIGRAM(S): 75 TABLET, FILM COATED ORAL at 13:29

## 2018-07-06 RX ADMIN — MEMANTINE HYDROCHLORIDE 5 MILLIGRAM(S): 10 TABLET ORAL at 22:22

## 2018-07-06 NOTE — PROGRESS NOTE ADULT - SUBJECTIVE AND OBJECTIVE BOX
PGY 1 Note discussed with supervising resident and primary attending    Patient is a 67y old  Male who presents with a chief complaint of shortness of breath (30 Jun 2018 22:35)      INTERVAL HPI/OVERNIGHT EVENTS: offers no new complaints; patient is stable, accepting pureed diet well    MEDICATIONS  (STANDING):  amLODIPine   Tablet 5 milliGRAM(s) Oral daily  artificial  tears Solution 1 Drop(s) Both EYES two times a day  aspirin  chewable 81 milliGRAM(s) Oral daily  atorvastatin 40 milliGRAM(s) Oral at bedtime  carbidopa/levodopa  25/100 1 Tablet(s) Oral <User Schedule>  carbidopa/levodopa  25/100 1.5 Tablet(s) Oral <User Schedule>  clopidogrel Tablet 75 milliGRAM(s) Oral daily  docusate sodium 100 milliGRAM(s) Oral two times a day  memantine 5 milliGRAM(s) Oral at bedtime  pantoprazole    Tablet 40 milliGRAM(s) Oral before breakfast  polyethylene glycol 3350 17 Gram(s) Oral daily  senna 2 Tablet(s) Oral at bedtime  traZODone 50 milliGRAM(s) Oral daily    MEDICATIONS  (PRN):    REVIEW OF SYSTEMS:    CONSTITUTIONAL: No fever,   EYES: blind  right eye   NECK: No pain or stiffness  RESPIRATORY: No cough; No shortness of breath  CARDIOVASCULAR: No chest pain, no palpitations  GASTROINTESTINAL: No pain. No nausea or vomiting; No diarrhea, decreased swallowing   NEUROLOGICAL: No headache or numbness, tremors+, decreased movements, non verbel  MUSCULOSKELETAL: No joint pain, no muscle pain,   GENITOURINARY: no dysuria, no frequency, no hesitancy  PSYCHIATRY: no depression , no anxiety, parkinson disease+  ALL OTHER  ROS negative        Vital Signs Last 24 Hrs  T(C): 36.6 (06 Jul 2018 20:49), Max: 36.9 (06 Jul 2018 05:19)  T(F): 97.9 (06 Jul 2018 20:49), Max: 98.4 (06 Jul 2018 05:19)  HR: 66 (06 Jul 2018 20:49) (66 - 71)  BP: 109/53 (06 Jul 2018 20:49) (109/53 - 125/63)  BP(mean): --  RR: 16 (06 Jul 2018 20:49) (16 - 16)  SpO2: 98% (06 Jul 2018 20:49) (98% - 100%)    PHYSICAL EXAM:  GENERAL: NAD  HEENT: Normocephalic;  conjunctivae and sclerae clear; moist mucous membranes; decreased swallow  NECK : supple  CHEST/LUNG: Clear to auscultation bilaterally with good air entry   HEART: S1 S2  regular; no murmurs, gallops or rubs  ABDOMEN: Soft, Nontender, Nondistended; Bowel sounds present  EXTREMITIES: no cyanosis; no edema; no calf tenderness. tremors+, decreased ROM, bradykinesia  SKIN: warm and dry; no rash  NERVOUS SYSTEM:  Awake but non verbal ; no new deficits    _________________________________________________  LABS                                                12.6   6.8   )-----------( 170      ( 05 Jul 2018 07:00 )             38.5   07-06    147<H>  |  111<H>  |  12  ----------------------------<  78  3.6   |  27  |  0.73    Ca    8.9      06 Jul 2018 08:08  Phos  3.1     07-05  Mg     2.1     07-05      Care Discussed with Consultants :     Plan of care was discussed with patient and /or primary care giver; all questions and concerns were addressed and care was aligned with patient's wishes.

## 2018-07-06 NOTE — PROGRESS NOTE ADULT - ASSESSMENT
patient is calm and stable on bed. patient accepting pureed - honey thick liquids well , swallowing improved. Nurse practitioner Spoke with patient's guardian (Darcy Chadwick: [phone] / [phone] x [phone]) and  (Salena Alvarez) on the phone. Discussed status. Aware pt passed swallow re-evaluation. Reports she does not have medical decision making power - agreeable to 2-physician MOLST as appropriate.

## 2018-07-06 NOTE — PROGRESS NOTE ADULT - SUBJECTIVE AND OBJECTIVE BOX
Patient is a 67y old  Male who presents with a chief complaint of shortness of breath (30 Jun 2018 22:35)      Awake , alert lying in bed  in NAD. Pt has no new complaints.    INTERVAL HPI/OVERNIGHT EVENTS:      VITAL SIGNS:  T(F): 98.4 (07-06-18 @ 05:19)  HR: 69 (07-06-18 @ 05:19)  BP: 111/55 (07-06-18 @ 05:19)  RR: 16 (07-06-18 @ 05:19)  SpO2: 100% (07-06-18 @ 05:19)  Wt(kg): --  I&O's Detail          REVIEW OF SYSTEMS:    CONSTITUTIONAL:  No fevers, chills, sweats    HEENT:  Eyes:  No diplopia or blurred vision. ENT:  No earache, sore throat or runny nose.    CARDIOVASCULAR:  No pressure, squeezing, tightness, or heaviness about the chest; no palpitations.    RESPIRATORY:  Per HPI    GASTROINTESTINAL:  No abdominal pain, nausea, vomiting or diarrhea.    GENITOURINARY:  No dysuria, frequency or urgency.    NEUROLOGIC:  No paresthesias, fasciculations, seizures or weakness.    PSYCHIATRIC:  No disorder of thought or mood.      PHYSICAL EXAM:    Constitutional: Well developed and nourished  Eyes:Perrla  ENMT: normal  Neck:supple  Respiratory: CTA b/l  Cardiovascular: S1 S2 regular  Gastrointestinal: Soft, Non tender  Extremities: No edema  Vascular:normal  Neurological:Awake, alert,Ox3  Musculoskeletal:Normal      MEDICATIONS  (STANDING):  amLODIPine   Tablet 5 milliGRAM(s) Oral daily  artificial  tears Solution 1 Drop(s) Both EYES two times a day  aspirin  chewable 81 milliGRAM(s) Oral daily  atorvastatin 40 milliGRAM(s) Oral at bedtime  carbidopa/levodopa  25/100 1 Tablet(s) Oral <User Schedule>  carbidopa/levodopa  25/100 1.5 Tablet(s) Oral <User Schedule>  clopidogrel Tablet 75 milliGRAM(s) Oral daily  docusate sodium 100 milliGRAM(s) Oral two times a day  memantine 5 milliGRAM(s) Oral at bedtime  pantoprazole    Tablet 40 milliGRAM(s) Oral before breakfast  polyethylene glycol 3350 17 Gram(s) Oral daily  senna 2 Tablet(s) Oral at bedtime  traZODone 50 milliGRAM(s) Oral daily    MEDICATIONS  (PRN):      Allergies    No Known Allergies    Intolerances        LABS:                        12.6   6.8   )-----------( 170      ( 05 Jul 2018 07:00 )             38.5     07-06    147<H>  |  111<H>  |  12  ----------------------------<  78  3.6   |  27  |  0.73    Ca    8.9      06 Jul 2018 08:08  Phos  3.1     07-05  Mg     2.1     07-05                CAPILLARY BLOOD GLUCOSE        pro-bnp 74 06-30 @ 17:49     d-dimer --  06-30 @ 17:49      RADIOLOGY & ADDITIONAL TESTS:    CXR:  < from: Xray Chest 1 View AP/PA (06.30.18 @ 18:06) >    IMPRESSION: Slightly increasing interstitial markings left base. Other   findings stable as above.    < end of copied text >    Ct scan chest:  < from: CT Abdomen and Pelvis w/ IV Cont (06.05.18 @ 18:37) >  IMPRESSION: No acute pathology. Cholelithiasis  Large amount of stool throughout the colon may indicate constipation  Mild superior compression fracture of L1    < end of copied text >    ekg;    echo:

## 2018-07-06 NOTE — PROGRESS NOTE ADULT - PROBLEM SELECTOR PLAN 1
Aspiration precautions  antibiotics  follow up CXR  oxygen supp  Bronchodilators. Aspiration precautions  completed antibiotics  follow up CXR  oxygen supp  Bronchodilators.

## 2018-07-06 NOTE — PROGRESS NOTE ADULT - ASSESSMENT
A 68 yo Male with multiple co-morbidities including Blindness of the Right Eye,  Parkinson's Disease, who was recently discharged from [facility] after treated for constipation, and now sent in to the ER by A for evaluation of productive cough  and mild shortness of breath. He has no fever or chills, no nausea or  vomiting. The CXR shows  increasing interstitial markings left base. He has started on Ceftriaxone and Flagyl, and the ID consult requested to assist with further evaluation and antibiotic management.     # Suspected Aspiration pneumonia  # Procalcitonin level is low     Would recommend:  1. Aspiration precaution  2. Monitor oFF antibiotics    3. OOb to chair    -D/C planning as per primary team

## 2018-07-06 NOTE — PROGRESS NOTE ADULT - SUBJECTIVE AND OBJECTIVE BOX
Patient was seen and examined  Patient is a 67y old  Male who presents with a chief complaint of shortness of breath (30 Jun 2018 22:35)      INTERVAL HPI/OVERNIGHT EVENTS:  T(C): 36.9 (07-06-18 @ 05:19), Max: 36.9 (07-06-18 @ 05:19)  HR: 69 (07-06-18 @ 05:19) (68 - 72)  BP: 111/55 (07-06-18 @ 05:19) (111/55 - 131/65)  RR: 16 (07-06-18 @ 05:19) (16 - 18)  SpO2: 100% (07-06-18 @ 05:19) (99% - 100%)  Wt(kg): --  I&O's Summary      LABS:                        12.6   6.8   )-----------( 170      ( 05 Jul 2018 07:00 )             38.5     07-06    147<H>  |  111<H>  |  12  ----------------------------<  78  3.6   |  27  |  0.73    Ca    8.9      06 Jul 2018 08:08  Phos  3.1     07-05  Mg     2.1     07-05          CAPILLARY BLOOD GLUCOSE                  MEDICATIONS  (STANDING):  amLODIPine   Tablet 5 milliGRAM(s) Oral daily  artificial  tears Solution 1 Drop(s) Both EYES two times a day  aspirin  chewable 81 milliGRAM(s) Oral daily  atorvastatin 40 milliGRAM(s) Oral at bedtime  carbidopa/levodopa  25/100 1 Tablet(s) Oral <User Schedule>  carbidopa/levodopa  25/100 1.5 Tablet(s) Oral <User Schedule>  clopidogrel Tablet 75 milliGRAM(s) Oral daily  docusate sodium 100 milliGRAM(s) Oral two times a day  memantine 5 milliGRAM(s) Oral at bedtime  pantoprazole    Tablet 40 milliGRAM(s) Oral before breakfast  polyethylene glycol 3350 17 Gram(s) Oral daily  senna 2 Tablet(s) Oral at bedtime  traZODone 50 milliGRAM(s) Oral daily    MEDICATIONS  (PRN):      RADIOLOGY & ADDITIONAL TESTS:    Imaging Personally Reviewed:  [ ] YES  [ ] NO    REVIEW OF SYSTEMS:  CONSTITUTIONAL: No fever, weight loss, or fatigue  EYES: No eye pain, visual disturbances, or discharge  ENMT:  No difficulty hearing, tinnitus, vertigo; No sinus or throat pain  NECK: No pain or stiffness  BREASTS: No pain, masses, or nipple discharge  RESPIRATORY: No cough, wheezing, chills or hemoptysis; No shortness of breath  CARDIOVASCULAR: No chest pain, palpitations, dizziness, or leg swelling  GASTROINTESTINAL: No abdominal or epigastric pain. No nausea, vomiting, or hematemesis; No diarrhea or constipation. No melena or hematochezia.  GENITOURINARY: No dysuria, frequency, hematuria, or incontinence  NEUROLOGICAL: No headaches, memory loss, loss of strength, numbness, or tremors  SKIN: No itching, burning, rashes, or lesions   LYMPH NODES: No enlarged glands  ENDOCRINE: No heat or cold intolerance; No hair loss  MUSCULOSKELETAL: No joint pain or swelling; No muscle, back, or extremity pain  PSYCHIATRIC: No depression, anxiety, mood swings, or difficulty sleeping  HEME/LYMPH: No easy bruising, or bleeding gums  ALLERY AND IMMUNOLOGIC: No hives or eczema      Consultant(s) Notes Reviewed:  [ ] YES  [ ] NO    PHYSICAL EXAM:  GENERAL: NAD, well-groomed, well-developed  HEAD:  Atraumatic, Normocephalic  EYES: EOMI, PERRLA, conjunctiva and sclera clear  ENMT: No tonsillar erythema, exudates, or enlargement; Moist mucous membranes, Good dentition, No lesions  NECK: Supple, No JVD, Normal thyroid  NERVOUS SYSTEM:  Alert & Oriented X3, Good concentration; Motor Strength 5/5 B/L upper and lower extremities; DTRs 2+ intact and symmetric  CHEST/LUNG: Clear to percussion bilaterally; No rales, rhonchi, wheezing, or rubs  HEART: Regular rate and rhythm; No murmurs, rubs, or gallops  ABDOMEN: Soft, Nontender, Nondistended; Bowel sounds present  EXTREMITIES:  2+ Peripheral Pulses, No clubbing, cyanosis, or edema  LYMPH: No lymphadenopathy noted  SKIN: No rashes or lesions    Care Discussed with Consultants/Other Providers [ x] YES  [ ] NO

## 2018-07-06 NOTE — DIETITIAN INITIAL EVALUATION ADULT. - FACTORS AFF FOOD INTAKE
difficulty chewing/difficulty with food procurement/preparation/change in mental status/difficulty feeding self/difficulty swallowing

## 2018-07-06 NOTE — DIETITIAN INITIAL EVALUATION ADULT. - OTHER INFO
nutrition assessment for length of stay; lives home with HHA help; skin intact; s/p swallow re-evaluation with dysphagia puree nectar thicken liquid recommended 7/3/18, but poor appetite, Failure to Thrive, for Kcal count per GI consult, s/p Palliative consult; wt data from [place] EMR reviewed: 144.1 lb 6/6/18--.143 lb 7/4/18, fairly stable over past 1m nutrition assessment for length of stay; lives home with HHA help; skin intact; s/p swallow re-evaluation with dysphagia puree nectar thicken liquid recommended 7/3/18, but poor appetite, Failure to Thrive, for Kcal count per GI consult, s/p Palliative consult; wt data from [place] EMR reviewed: 144.1 lb 6/6/18--.143 lb 7/4/18, fairly stable over past 1m; observed breakfast today, pt sleepy at present; pt on discharge planning today per team, Kcal count is not feasible at present, Ensure Pudding added by MD

## 2018-07-06 NOTE — DIETITIAN INITIAL EVALUATION ADULT. - NUTRITION INTERVENTION
Medical Food Supplements/Collaboration and Referral of Nutrition Care/Feeding Assistance/Meals and Snack

## 2018-07-06 NOTE — PROGRESS NOTE ADULT - ASSESSMENT
patient is calm and stable on bed. patient accepting pureed - honey thick liquids well , swallowing improved. Nurse practitioner Spoke with patient's guardian (Darcy Chadwick: [phone] / [phone] x [phone]) and  (Salena Alvarez) on the phone. Discussed status. Aware pt passed swallow re-evaluation. Reports she does not have medical decision making power - agreeable to 2-physician MOLST as appropriate.patient is ready to discharge .just waiting with placement issues

## 2018-07-06 NOTE — PROGRESS NOTE ADULT - SUBJECTIVE AND OBJECTIVE BOX
Patient is seen and examined at the bed side, is afebrile. He is more awake and alert.      REVIEW OF SYSTEMS: Unable to obtain due to Mental status      ALLERGIES: NKDA        Vital Signs Last 24 Hrs  T(C): 36.5 (06 Jul 2018 14:58), Max: 36.9 (06 Jul 2018 05:19)  T(F): 97.7 (06 Jul 2018 14:58), Max: 98.4 (06 Jul 2018 05:19)  HR: 71 (06 Jul 2018 14:58) (68 - 71)  BP: 125/63 (06 Jul 2018 14:58) (111/55 - 125/63)  BP(mean): --  RR: 16 (06 Jul 2018 14:58) (16 - 16)  SpO2: 99% (06 Jul 2018 14:58) (99% - 100%)      PHYSICAL EXAM:  GENERAL: Not in distress  CHEST/LUNG: Air entry  bilaterally  HEART: s1 and s2 present  ABDOMEN: Nontender, Nondistended  EXTREMITIES:  No pedal  edema  CNS: Awake and alert          LABS:                          12.6   6.8   )-----------( 170      ( 05 Jul 2018 07:00 )             38.5                  12.7   6.4   )-----------( 191      ( 03 Jul 2018 06:17 )             39.6         07-06    147<H>  |  111<H>  |  12  ----------------------------<  78  3.6   |  27  |  0.73    Ca    8.9      06 Jul 2018 08:08  Phos  3.1     07-05  Mg     2.1     07-05      07-05    148<H>  |  113<H>  |  10  ----------------------------<  85  3.7   |  28  |  0.70    Ca    8.5      05 Jul 2018 07:00  Phos  3.1     07-05  Mg     2.1     07-05      MEDICATIONS  (STANDING):  amLODIPine   Tablet 5 milliGRAM(s) Oral daily  artificial  tears Solution 1 Drop(s) Both EYES two times a day  aspirin  chewable 81 milliGRAM(s) Oral daily  atorvastatin 40 milliGRAM(s) Oral at bedtime  carbidopa/levodopa  25/100 1 Tablet(s) Oral <User Schedule>  carbidopa/levodopa  25/100 1.5 Tablet(s) Oral <User Schedule>  clopidogrel Tablet 75 milliGRAM(s) Oral daily  docusate sodium 100 milliGRAM(s) Oral two times a day  memantine 5 milliGRAM(s) Oral at bedtime  pantoprazole    Tablet 40 milliGRAM(s) Oral before breakfast  polyethylene glycol 3350 17 Gram(s) Oral daily  senna 2 Tablet(s) Oral at bedtime  traZODone 50 milliGRAM(s) Oral daily    MEDICATIONS  (PRN):        RADIOLOGY & ADDITIONAL TESTS:    6/30/18: Xray Chest 1 View AP/PA (06.30.18 @ 18:06) : Slightly increasing interstitial markings left base.       MICROBIOLOGY DATA:      Rapid Respiratory Viral Panel (07.01.18 @ 22:02)    Rapid RVP Result: NotDete: The [employer] RVP Rapid uses polymerase chain reaction (PCR) and melt  curve analysis to screen for adenovirus; coronavirus HKU1, NL63, 229E,  OC43; human metapneumovirus (hMPV); human enterovirus/rhinovirus  (Entero/RV); influenza A; influenza A/H1;influenza A/H3; influenza  A/H1-2009; influenza B; parainfluenza viruses 1, 2, 3, 4; respiratory  syncytial virus; Bordetella pertussis; Mycoplasma pneumoniae; and  Chlamydophila pneumoniae.    Culture - Blood (07.01.18 @ 00:12)    Specimen Source: .Blood Blood    Culture Results:   No growth to date.    Culture - Blood (07.01.18 @ 00:12)    Specimen Source: .Blood Blood    Culture Results:   No growth to date.

## 2018-07-06 NOTE — DIETITIAN INITIAL EVALUATION ADULT. - PROBLEM SELECTOR PLAN 1
likely concern for aspiration pneumonia { as per pcp}  patient has no fevers and no leucocyte count   follow with procalcitonin   cxr : clear   follow with repeat  cxr in 48 hours to r/o  aspiration pneumonia   speech and swallow evaluation per MD

## 2018-07-07 RX ADMIN — CARBIDOPA AND LEVODOPA 1.5 TABLET(S): 25; 100 TABLET ORAL at 06:18

## 2018-07-07 RX ADMIN — ATORVASTATIN CALCIUM 40 MILLIGRAM(S): 80 TABLET, FILM COATED ORAL at 22:47

## 2018-07-07 RX ADMIN — AMLODIPINE BESYLATE 5 MILLIGRAM(S): 2.5 TABLET ORAL at 06:19

## 2018-07-07 RX ADMIN — MEMANTINE HYDROCHLORIDE 5 MILLIGRAM(S): 10 TABLET ORAL at 22:47

## 2018-07-07 RX ADMIN — CLOPIDOGREL BISULFATE 75 MILLIGRAM(S): 75 TABLET, FILM COATED ORAL at 11:55

## 2018-07-07 RX ADMIN — Medication 1 DROP(S): at 06:18

## 2018-07-07 RX ADMIN — POLYETHYLENE GLYCOL 3350 17 GRAM(S): 17 POWDER, FOR SOLUTION ORAL at 11:55

## 2018-07-07 RX ADMIN — Medication 50 MILLIGRAM(S): at 11:55

## 2018-07-07 RX ADMIN — CARBIDOPA AND LEVODOPA 1 TABLET(S): 25; 100 TABLET ORAL at 20:22

## 2018-07-07 RX ADMIN — CARBIDOPA AND LEVODOPA 1.5 TABLET(S): 25; 100 TABLET ORAL at 16:41

## 2018-07-07 RX ADMIN — Medication 1 DROP(S): at 17:46

## 2018-07-07 RX ADMIN — Medication 81 MILLIGRAM(S): at 11:55

## 2018-07-07 RX ADMIN — CARBIDOPA AND LEVODOPA 1 TABLET(S): 25; 100 TABLET ORAL at 11:55

## 2018-07-07 RX ADMIN — Medication 100 MILLIGRAM(S): at 17:46

## 2018-07-07 RX ADMIN — SENNA PLUS 2 TABLET(S): 8.6 TABLET ORAL at 22:47

## 2018-07-07 RX ADMIN — PANTOPRAZOLE SODIUM 40 MILLIGRAM(S): 20 TABLET, DELAYED RELEASE ORAL at 06:18

## 2018-07-07 NOTE — PROGRESS NOTE ADULT - SUBJECTIVE AND OBJECTIVE BOX
Patient was seen and examined  Patient is a 67y old  Male who presents with a chief complaint of shortness of breath (30 Jun 2018 22:35)      INTERVAL HPI/OVERNIGHT EVENTS:  T(C): 36.3 (07-07-18 @ 05:48), Max: 36.6 (07-06-18 @ 20:49)  HR: 67 (07-07-18 @ 05:48) (66 - 71)  BP: 134/67 (07-07-18 @ 05:48) (109/53 - 134/67)  RR: 16 (07-07-18 @ 05:48) (16 - 16)  SpO2: 99% (07-07-18 @ 05:48) (98% - 99%)  Wt(kg): --  I&O's Summary      LABS:    07-06    147<H>  |  111<H>  |  12  ----------------------------<  78  3.6   |  27  |  0.73    Ca    8.9      06 Jul 2018 08:08          CAPILLARY BLOOD GLUCOSE                  MEDICATIONS  (STANDING):  amLODIPine   Tablet 5 milliGRAM(s) Oral daily  artificial  tears Solution 1 Drop(s) Both EYES two times a day  aspirin  chewable 81 milliGRAM(s) Oral daily  atorvastatin 40 milliGRAM(s) Oral at bedtime  carbidopa/levodopa  25/100 1 Tablet(s) Oral <User Schedule>  carbidopa/levodopa  25/100 1.5 Tablet(s) Oral <User Schedule>  clopidogrel Tablet 75 milliGRAM(s) Oral daily  docusate sodium 100 milliGRAM(s) Oral two times a day  memantine 5 milliGRAM(s) Oral at bedtime  pantoprazole    Tablet 40 milliGRAM(s) Oral before breakfast  polyethylene glycol 3350 17 Gram(s) Oral daily  senna 2 Tablet(s) Oral at bedtime  traZODone 50 milliGRAM(s) Oral daily    MEDICATIONS  (PRN):      RADIOLOGY & ADDITIONAL TESTS:    Imaging Personally Reviewed:  [ ] YES  [ ] NO    REVIEW OF SYSTEMS:  unable to provide       Consultant(s) Notes Reviewed:  [ x ] YES  [ ] NO    PHYSICAL EXAM:  GENERAL: NAD, well-groomed, well-developed  HEAD:  Atraumatic, Normocephalic  EYES: EOMI, PERRLA, conjunctiva and sclera clear  ENMT: No tonsillar erythema, exudates, or enlargement; Moist mucous membranes, Good dentition, No lesions  NECK: Supple, No JVD, Normal thyroid  NERVOUS SYSTEM:  awake non verbal   CHEST/LUNG: Clear to percussion bilaterally; No rales, rhonchi, wheezing, or rubs  HEART: Regular rate and rhythm; No murmurs, rubs, or gallops  ABDOMEN: Soft, Nontender, Nondistended; Bowel sounds present  EXTREMITIES:  2+ Peripheral Pulses, No clubbing, cyanosis, or edema  LYMPH: No lymphadenopathy noted  SKIN: No rashes or lesions    Care Discussed with Consultants/Other Providers [ x] YES  [ ] NO

## 2018-07-07 NOTE — PROGRESS NOTE ADULT - ASSESSMENT
patient is calm and stable on bed. patient accepting pureed - honey thick liquids well , swallowing improved. Nurse practitioner Spoke with patient's guardian (Darcy Chadwick: [phone] / [phone] x [phone]) and  (Salena Alvarez) on the phone. Discussed status. Aware pt passed swallow re-evaluation. Reports she does not have medical decision making power - agreeable to 2-physician MOLST as appropriate.patient is ready to discharge .just waiting with placement issues

## 2018-07-07 NOTE — PROGRESS NOTE ADULT - SUBJECTIVE AND OBJECTIVE BOX
Patient is a 67y old  Male who presents with a chief complaint of shortness of breath (30 Jun 2018 22:35)    Awake , alert lying in bed  in NAD. Pt has no new complaints.    INTERVAL HPI/OVERNIGHT EVENTS:      VITAL SIGNS:  T(F): 97.4 (07-07-18 @ 05:48)  HR: 67 (07-07-18 @ 05:48)  BP: 134/67 (07-07-18 @ 05:48)  RR: 16 (07-07-18 @ 05:48)  SpO2: 99% (07-07-18 @ 05:48)  Wt(kg): --  I&O's Detail          REVIEW OF SYSTEMS:    CONSTITUTIONAL:  No fevers, chills, sweats    HEENT:  Eyes:  No diplopia or blurred vision. ENT:  No earache, sore throat or runny nose.    CARDIOVASCULAR:  No pressure, squeezing, tightness, or heaviness about the chest; no palpitations.    RESPIRATORY:  Per HPI    GASTROINTESTINAL:  No abdominal pain, nausea, vomiting or diarrhea.    GENITOURINARY:  No dysuria, frequency or urgency.    NEUROLOGIC:  No paresthesias, fasciculations, seizures or weakness.    PSYCHIATRIC:  No disorder of thought or mood.      PHYSICAL EXAM:    Constitutional: Well developed and nourished  Eyes:Perrla  ENMT: normal  Neck:supple  Respiratory: good air entry  Cardiovascular: S1 S2 regular  Gastrointestinal: Soft, Non tender  Extremities: No edema  Vascular:normal  Neurological:Awake, alert,Ox3  Musculoskeletal:Normal      MEDICATIONS  (STANDING):  amLODIPine   Tablet 5 milliGRAM(s) Oral daily  artificial  tears Solution 1 Drop(s) Both EYES two times a day  aspirin  chewable 81 milliGRAM(s) Oral daily  atorvastatin 40 milliGRAM(s) Oral at bedtime  carbidopa/levodopa  25/100 1 Tablet(s) Oral <User Schedule>  carbidopa/levodopa  25/100 1.5 Tablet(s) Oral <User Schedule>  clopidogrel Tablet 75 milliGRAM(s) Oral daily  docusate sodium 100 milliGRAM(s) Oral two times a day  memantine 5 milliGRAM(s) Oral at bedtime  pantoprazole    Tablet 40 milliGRAM(s) Oral before breakfast  polyethylene glycol 3350 17 Gram(s) Oral daily  senna 2 Tablet(s) Oral at bedtime  traZODone 50 milliGRAM(s) Oral daily    MEDICATIONS  (PRN):      Allergies    No Known Allergies    Intolerances        LABS:    07-06    147<H>  |  111<H>  |  12  ----------------------------<  78  3.6   |  27  |  0.73    Ca    8.9      06 Jul 2018 08:08                CAPILLARY BLOOD GLUCOSE        pro-bnp 74 06-30 @ 17:49     d-dimer --  06-30 @ 17:49      RADIOLOGY & ADDITIONAL TESTS:    CXR:  < from: Xray Chest 1 View AP/PA (06.30.18 @ 18:06) >  IMPRESSION: Slightly increasing interstitial markings left base. Other   findings stable as above.    < end of copied text >    Ct scan chest:    ekg;    echo: Patient is a 67y old  Male who presents with a chief complaint of shortness of breath (30 Jun 2018 22:35)    Awake , alert,  lying in bed  in NAD. Pt has no new complaints.    INTERVAL HPI/OVERNIGHT EVENTS:      VITAL SIGNS:  T(F): 97.4 (07-07-18 @ 05:48)  HR: 67 (07-07-18 @ 05:48)  BP: 134/67 (07-07-18 @ 05:48)  RR: 16 (07-07-18 @ 05:48)  SpO2: 99% (07-07-18 @ 05:48)  Wt(kg): --  I&O's Detail          REVIEW OF SYSTEMS:    CONSTITUTIONAL:  No fevers, chills, sweats    HEENT:  Eyes:  No diplopia or blurred vision. ENT:  No earache, sore throat or runny nose.    CARDIOVASCULAR:  No pressure, squeezing, tightness, or heaviness about the chest; no palpitations.    RESPIRATORY:  Per HPI    GASTROINTESTINAL:  No abdominal pain, nausea, vomiting or diarrhea.    GENITOURINARY:  No dysuria, frequency or urgency.    NEUROLOGIC:  No paresthesias, fasciculations, seizures or weakness.    PSYCHIATRIC:  No disorder of thought or mood.      PHYSICAL EXAM:    Constitutional: Well developed and nourished  Eyes:Perrla  ENMT: normal  Neck:supple  Respiratory: good air entry  Cardiovascular: S1 S2 regular  Gastrointestinal: Soft, Non tender  Extremities: No edema  Vascular:normal  Neurological:Awake, alert,Ox3  Musculoskeletal:Normal      MEDICATIONS  (STANDING):  amLODIPine   Tablet 5 milliGRAM(s) Oral daily  artificial  tears Solution 1 Drop(s) Both EYES two times a day  aspirin  chewable 81 milliGRAM(s) Oral daily  atorvastatin 40 milliGRAM(s) Oral at bedtime  carbidopa/levodopa  25/100 1 Tablet(s) Oral <User Schedule>  carbidopa/levodopa  25/100 1.5 Tablet(s) Oral <User Schedule>  clopidogrel Tablet 75 milliGRAM(s) Oral daily  docusate sodium 100 milliGRAM(s) Oral two times a day  memantine 5 milliGRAM(s) Oral at bedtime  pantoprazole    Tablet 40 milliGRAM(s) Oral before breakfast  polyethylene glycol 3350 17 Gram(s) Oral daily  senna 2 Tablet(s) Oral at bedtime  traZODone 50 milliGRAM(s) Oral daily    MEDICATIONS  (PRN):      Allergies    No Known Allergies    Intolerances        LABS:    07-06    147<H>  |  111<H>  |  12  ----------------------------<  78  3.6   |  27  |  0.73    Ca    8.9      06 Jul 2018 08:08                CAPILLARY BLOOD GLUCOSE        pro-bnp 74 06-30 @ 17:49     d-dimer --  06-30 @ 17:49      RADIOLOGY & ADDITIONAL TESTS:    CXR:  < from: Xray Chest 1 View AP/PA (06.30.18 @ 18:06) >  IMPRESSION: Slightly increasing interstitial markings left base. Other   findings stable as above.    < end of copied text >    Ct scan chest:    ekg;    echo:

## 2018-07-08 RX ORDER — CLOPIDOGREL BISULFATE 75 MG/1
1 TABLET, FILM COATED ORAL
Qty: 0 | Refills: 0 | COMMUNITY
Start: 2018-07-08

## 2018-07-08 RX ORDER — AMLODIPINE BESYLATE 2.5 MG/1
1 TABLET ORAL
Qty: 0 | Refills: 0 | COMMUNITY
Start: 2018-07-08

## 2018-07-08 RX ORDER — DOCUSATE SODIUM 100 MG
1 CAPSULE ORAL
Qty: 0 | Refills: 0 | COMMUNITY
Start: 2018-07-08

## 2018-07-08 RX ORDER — CARBIDOPA AND LEVODOPA 25; 100 MG/1; MG/1
1 TABLET ORAL
Qty: 0 | Refills: 0 | COMMUNITY
Start: 2018-07-08

## 2018-07-08 RX ORDER — MEMANTINE HYDROCHLORIDE 10 MG/1
1 TABLET ORAL
Qty: 0 | Refills: 0 | COMMUNITY
Start: 2018-07-08

## 2018-07-08 RX ORDER — CARBIDOPA AND LEVODOPA 25; 100 MG/1; MG/1
1.5 TABLET ORAL
Qty: 0 | Refills: 0 | COMMUNITY
Start: 2018-07-08

## 2018-07-08 RX ORDER — SENNA PLUS 8.6 MG/1
2 TABLET ORAL
Qty: 0 | Refills: 0 | COMMUNITY
Start: 2018-07-08

## 2018-07-08 RX ORDER — PANTOPRAZOLE SODIUM 20 MG/1
1 TABLET, DELAYED RELEASE ORAL
Qty: 0 | Refills: 0 | COMMUNITY
Start: 2018-07-08

## 2018-07-08 RX ORDER — ASPIRIN/CALCIUM CARB/MAGNESIUM 324 MG
1 TABLET ORAL
Qty: 0 | Refills: 0 | COMMUNITY
Start: 2018-07-08

## 2018-07-08 RX ADMIN — Medication 100 MILLIGRAM(S): at 18:05

## 2018-07-08 RX ADMIN — CARBIDOPA AND LEVODOPA 1.5 TABLET(S): 25; 100 TABLET ORAL at 14:40

## 2018-07-08 RX ADMIN — Medication 81 MILLIGRAM(S): at 12:03

## 2018-07-08 RX ADMIN — POLYETHYLENE GLYCOL 3350 17 GRAM(S): 17 POWDER, FOR SOLUTION ORAL at 12:03

## 2018-07-08 RX ADMIN — CARBIDOPA AND LEVODOPA 1 TABLET(S): 25; 100 TABLET ORAL at 12:03

## 2018-07-08 RX ADMIN — AMLODIPINE BESYLATE 5 MILLIGRAM(S): 2.5 TABLET ORAL at 06:20

## 2018-07-08 RX ADMIN — CARBIDOPA AND LEVODOPA 1 TABLET(S): 25; 100 TABLET ORAL at 18:05

## 2018-07-08 RX ADMIN — ATORVASTATIN CALCIUM 40 MILLIGRAM(S): 80 TABLET, FILM COATED ORAL at 21:29

## 2018-07-08 RX ADMIN — MEMANTINE HYDROCHLORIDE 5 MILLIGRAM(S): 10 TABLET ORAL at 21:29

## 2018-07-08 RX ADMIN — PANTOPRAZOLE SODIUM 40 MILLIGRAM(S): 20 TABLET, DELAYED RELEASE ORAL at 06:21

## 2018-07-08 RX ADMIN — Medication 50 MILLIGRAM(S): at 12:03

## 2018-07-08 RX ADMIN — Medication 1 DROP(S): at 06:20

## 2018-07-08 RX ADMIN — SENNA PLUS 2 TABLET(S): 8.6 TABLET ORAL at 21:29

## 2018-07-08 RX ADMIN — Medication 100 MILLIGRAM(S): at 06:20

## 2018-07-08 RX ADMIN — CARBIDOPA AND LEVODOPA 1.5 TABLET(S): 25; 100 TABLET ORAL at 06:22

## 2018-07-08 RX ADMIN — CLOPIDOGREL BISULFATE 75 MILLIGRAM(S): 75 TABLET, FILM COATED ORAL at 12:03

## 2018-07-08 NOTE — DISCHARGE NOTE ADULT - SECONDARY DIAGNOSIS.
Parkinson's disease CVA (cerebral vascular accident) HLD (hyperlipidemia) HTN (hypertension) Constipation

## 2018-07-08 NOTE — DISCHARGE NOTE ADULT - HOSPITAL COURSE
68 y/o M pt with PMHx of Blindness of the R Eye, CVA (non-verbal at baseline), HTN, HLD, and Parkinson's Disease with HHA{ who is only with him for past few days and now currently  not at the bedside } admiited to ED for cough with sputum production  and associated with mild shortness of breath . AS PER the E.D charts patient HHA denies any nausea , vomiting , abdominal pain or any other acute complaints . patient was recently admitted for [place] for constipation  and discharged on bowel regimen .  IN the E.D patient vitals are stable , cxr : clear ; labs :  no leucocytosis    Patient was admitted to Medical floor for  1]shortness of breath and concern for cough(aspiration Pneumonia)  2]parkinsons disease   3}cva   4}need for prophylactic measure    Patient was started on IV Zoysn, Levaquin, Flagyl anf Maxipime. Patient also had cough on food intake.Swallow evaluation was done and NPO recommended except medications. meds were given in apple sauce. Patient improved over time and Antibiotics were discontinued. Neurology was consulted for Parkinson disease medication optimization. Swallow re-evaluation was done and patient's swallow improved significantly. patient was advanced to pureed and honey thick liquid diet. PT evaluation recommended balance and bed positioning training. 68 y/o M pt with PMHx of Blindness of the R Eye, CVA (non-verbal at baseline), HTN, HLD, and Parkinson's Disease with HHA{ who is only with him for past few days and now currently  not at the bedside } admiited to ED for cough with sputum production  and associated with mild shortness of breath . AS PER the E.D charts patient HHA denies any nausea , vomiting , abdominal pain or any other acute complaints . patient was recently admitted for [place] for constipation  and discharged on bowel regimen .  IN the E.D patient vitals are stable , cxr : clear ; labs :  no leucocytosis    Patient was admitted to Medical floor for  1]shortness of breath and concern for cough(aspiration Pneumonia)  2]parkinsons disease   3}cva   4}need for prophylactic measure    Patient was started on IV Zoysn, Levaquin, Flagyl anf Maxipime, however discontinued when patient was persistently afebrile and CXR was negative. Patient also had cough on food intake. Swallow evaluation was done and NPO recommended except medications. meds were given in apple sauce. Patient improved over time after adjustment of parkinson medication regimen with help of neurology consultation.  Swallow re-evaluation was done and patient's swallow improved significantly. patient was advanced to pureed and honey thick liquid diet. PT evaluation recommended balance and bed positioning training at home.     Patient is stable for discharge home with home health aid. Discussed above with attending Dr. Gonsalez.

## 2018-07-08 NOTE — PROGRESS NOTE ADULT - ASSESSMENT
Patient is a 67y old  Male who presents with a chief complaint of shortness of breath (30 Jun 2018 22:35)  patient is calm and stable on bed. patient accepting pureed - honey thick liquids well , swallowing improved. Nurse practitioner Spoke with patient's guardian (Darcy Chadwick: [phone] / [phone] x [phone]) and  (Salena Alvarez) on the phone. Discussed status. Aware pt passed swallow re-evaluation. Reports she does not have medical decision making power - agreeable to 2-physician MOLST as appropriate.patient is ready to discharge , probably monday.just waiting with placement issues

## 2018-07-08 NOTE — DISCHARGE NOTE ADULT - CARE PLAN
Principal Discharge DX:	Aspiration pneumonia  Goal:	prevent aspiration of food  Assessment and plan of treatment:	You were diagnosed with Aspiration Pneumonitis. Keep taking the pureed and honey thick liquid diet slowly with spoon with assistance. Avoid big meals. Follow up with your Primary care doctor after 2 weeks  Secondary Diagnosis:	Parkinson's disease  Goal:	Improve quality of life  Assessment and plan of treatment:	You have history of Parkinson Disease. keep taking Sinemet as prescribed, continue with pureed honey thick liquid diet. Follow up with your Primary care doctor after 2 weeks  Secondary Diagnosis:	CVA (cerebral vascular accident)  Goal:	Prevent Stroke and other cardiovascular events  Assessment and plan of treatment:	You have history of CVA. Keep taking Aspirin and Plavix as prescribed. Follow up with your Primary care doctor after 2 weeks  Secondary Diagnosis:	HLD (hyperlipidemia)  Goal:	Prevent Stroke and other cardiovascular events  Assessment and plan of treatment:	You have   history of hyperlipidemia. Keep taking Lipitor as prescribed. Follow up with your Primary care doctor after 2 weeks  Secondary Diagnosis:	HTN (hypertension)  Goal:	BP<140/90  Assessment and plan of treatment:	You have history of HTN. Keep taking Amlodipine as  prescribed .Follow up with your Primary care doctor after 2 weeks  Secondary Diagnosis:	Constipation  Goal:	Improve Bowel motion  Assessment and plan of treatment:	Keep taking the senna and colace as prescribed. Follow up with your Primary care doctor after 2 weeks Principal Discharge DX:	Aspiration pneumonia  Goal:	prevent aspiration of food  Assessment and plan of treatment:	You were diagnosed with Aspiration Pneumonitis. Keep taking the pureed and honey thick liquid diet slowly with spoon with assistance, advance as tolerated. Avoid big meals. Follow up with your Primary care doctor after 2 weeks  Secondary Diagnosis:	Parkinson's disease  Goal:	Improve quality of life  Assessment and plan of treatment:	You have history of Parkinson Disease. keep taking Sinemet as prescribed, advance diet as tolerated. Follow up with your Primary care doctor after 2 weeks  Secondary Diagnosis:	CVA (cerebral vascular accident)  Goal:	Prevent Stroke and other cardiovascular events  Assessment and plan of treatment:	You have history of CVA. Keep taking Aspirin and Plavix as prescribed. Follow up with your Primary care doctor after 2 weeks  Secondary Diagnosis:	HLD (hyperlipidemia)  Goal:	Prevent Stroke and other cardiovascular events  Assessment and plan of treatment:	You have   history of hyperlipidemia. Keep taking Lipitor as prescribed. Follow up with your Primary care doctor after 2 weeks  Secondary Diagnosis:	HTN (hypertension)  Goal:	BP<140/90  Assessment and plan of treatment:	You have history of HTN. Keep taking Amlodipine as  prescribed .Follow up with your Primary care doctor after 2 weeks  Secondary Diagnosis:	Constipation  Goal:	Improve Bowel motion  Assessment and plan of treatment:	Keep taking the senna and colace as prescribed. Follow up with your Primary care doctor after 2 weeks Principal Discharge DX:	Aspiration pneumonia  Goal:	prevent aspiration of food  Assessment and plan of treatment:	You were diagnosed with Aspiration Pneumonitis. Keep taking the pureed and honey thick liquid diet slowly with spoon with assistance, advance as tolerated. Avoid big meals. Follow up with your Primary care doctor after 2 weeks  Secondary Diagnosis:	Parkinson's disease  Goal:	Improve quality of life  Assessment and plan of treatment:	You have history of Parkinson Disease. keep taking Sinemet as prescribed, advance diet as tolerated. Follow up with your Primary care doctor after 2 weeks  Secondary Diagnosis:	CVA (cerebral vascular accident)  Goal:	Prevent Stroke and other cardiovascular events  Assessment and plan of treatment:	You have history of CVA. Keep taking Aspirin and Plavix as prescribed. Follow up with your Primary care doctor after 2 weeks  Secondary Diagnosis:	HLD (hyperlipidemia)  Goal:	Prevent Stroke and other cardiovascular events  Assessment and plan of treatment:	You have  history of hyperlipidemia. Keep taking Lipitor as prescribed. Follow up with your Primary care doctor after 2 weeks  Secondary Diagnosis:	HTN (hypertension)  Goal:	BP<140/90  Assessment and plan of treatment:	You have history of HTN. Keep taking Amlodipine as  prescribed .Follow up with your Primary care doctor after 2 weeks  Secondary Diagnosis:	Constipation  Goal:	Improve Bowel motion  Assessment and plan of treatment:	Keep taking the senna and colace as prescribed. Follow up with your Primary care doctor after 2 weeks

## 2018-07-08 NOTE — PROGRESS NOTE ADULT - ASSESSMENT
patient is calm and stable on bed. patient accepting pureed - honey thick liquids well , swallowing improved. Nurse practitioner Spoke with patient's guardian (Darcy Chadwick: [phone] / [phone] x [phone]) and  (Salena Alvarez) on the phone. Discussed status. Aware pt passed swallow re-evaluation. Reports she does not have medical decision making power - agreeable to 2-physician [facility] as appropriate.patient is ready to discharge .just waiting with placement issues

## 2018-07-08 NOTE — PROGRESS NOTE ADULT - SUBJECTIVE AND OBJECTIVE BOX
Patient was seen and examined  Patient is a 67y old  Male who presents with a chief complaint of shortness of breath (30 Jun 2018 22:35)      INTERVAL HPI/OVERNIGHT EVENTS:  T(C): 36.9 (07-08-18 @ 05:30), Max: 37.2 (07-07-18 @ 21:23)  HR: 58 (07-08-18 @ 05:30) (58 - 78)  BP: 122/63 (07-08-18 @ 05:30) (117/59 - 122/63)  RR: 17 (07-08-18 @ 05:30) (17 - 19)  SpO2: 99% (07-08-18 @ 05:30) (98% - 99%)  Wt(kg): --  I&O's Summary      LABS:            MEDICATIONS  (STANDING):  amLODIPine   Tablet 5 milliGRAM(s) Oral daily  artificial  tears Solution 1 Drop(s) Both EYES two times a day  aspirin  chewable 81 milliGRAM(s) Oral daily  atorvastatin 40 milliGRAM(s) Oral at bedtime  carbidopa/levodopa  25/100 1 Tablet(s) Oral <User Schedule>  carbidopa/levodopa  25/100 1.5 Tablet(s) Oral <User Schedule>  clopidogrel Tablet 75 milliGRAM(s) Oral daily  docusate sodium 100 milliGRAM(s) Oral two times a day  memantine 5 milliGRAM(s) Oral at bedtime  pantoprazole    Tablet 40 milliGRAM(s) Oral before breakfast  polyethylene glycol 3350 17 Gram(s) Oral daily  senna 2 Tablet(s) Oral at bedtime  traZODone 50 milliGRAM(s) Oral daily    MEDICATIONS  (PRN):      RADIOLOGY & ADDITIONAL TESTS:    Imaging Personally Reviewed:  [ ] YES  [ ] NO    REVIEW OF SYSTEMS:  NAD      Consultant(s) Notes Reviewed:  [ x ] YES  [ ] NO    PHYSICAL EXAM:  GENERAL: NAD, well-groomed, well-developed  HEAD:  Atraumatic, Normocephalic  EYES: EOMI, PERRLA, conjunctiva and sclera clear  ENMT: No tonsillar erythema, exudates, or enlargement; Moist mucous membranes, Good dentition, No lesions  NECK: Supple, No JVD, Normal thyroid  NERVOUS SYSTEM: awake no asymetry   CHEST/LUNG: Clear to percussion bilaterally; No rales, rhonchi, wheezing, or rubs  HEART: Regular rate and rhythm; No murmurs, rubs, or gallops  ABDOMEN: Soft, Nontender, Nondistended; Bowel sounds present  EXTREMITIES:  2+ Peripheral Pulses, No clubbing, cyanosis, or edema  LYMPH: No lymphadenopathy noted  SKIN: No rashes or lesions    Care Discussed with Consultants/Other Providers [ x] YES  [ ] NO

## 2018-07-08 NOTE — DISCHARGE NOTE ADULT - CARE PROVIDER_API CALL
Alber Nicole  [address], [place] [address]  Phone: [phone]  Fax: (   )    -

## 2018-07-08 NOTE — DISCHARGE NOTE ADULT - PATIENT PORTAL LINK FT
You can access the [facility] Patient Portal, offered by [facility], by registering with the following website: [email or web address]

## 2018-07-08 NOTE — PROGRESS NOTE ADULT - SUBJECTIVE AND OBJECTIVE BOX
PGY 1 Note discussed with supervising resident and primary attending    Patient is a 67y old  Male who presents with a chief complaint of shortness of breath (30 Jun 2018 22:35)      INTERVAL HPI/OVERNIGHT EVENTS: offers no new complaints; patient is stable, accepting pureed diet well    MEDICATIONS  (STANDING):  amLODIPine   Tablet 5 milliGRAM(s) Oral daily  artificial  tears Solution 1 Drop(s) Both EYES two times a day  aspirin  chewable 81 milliGRAM(s) Oral daily  atorvastatin 40 milliGRAM(s) Oral at bedtime  carbidopa/levodopa  25/100 1 Tablet(s) Oral <User Schedule>  carbidopa/levodopa  25/100 1.5 Tablet(s) Oral <User Schedule>  clopidogrel Tablet 75 milliGRAM(s) Oral daily  docusate sodium 100 milliGRAM(s) Oral two times a day  memantine 5 milliGRAM(s) Oral at bedtime  pantoprazole    Tablet 40 milliGRAM(s) Oral before breakfast  polyethylene glycol 3350 17 Gram(s) Oral daily  senna 2 Tablet(s) Oral at bedtime  traZODone 50 milliGRAM(s) Oral daily    REVIEW OF SYSTEMS:    CONSTITUTIONAL: No fever,   EYES: blind  right eye   NECK: No pain or stiffness  RESPIRATORY: No cough; No shortness of breath  CARDIOVASCULAR: No chest pain, no palpitations  GASTROINTESTINAL: No pain. No nausea or vomiting; No diarrhea, decreased swallowing   NEUROLOGICAL: No headache or numbness, tremors+, decreased movements, non verbel  MUSCULOSKELETAL: No joint pain, no muscle pain,   GENITOURINARY: no dysuria, no frequency, no hesitancy  PSYCHIATRY: no depression , no anxiety, parkinson disease+  ALL OTHER  ROS negative        Vital Signs Last 24 Hrs  T(C): 36.9 (08 Jul 2018 05:30), Max: 37.2 (07 Jul 2018 21:23)  T(F): 98.5 (08 Jul 2018 05:30), Max: 99 (07 Jul 2018 21:23)  HR: 58 (08 Jul 2018 05:30) (58 - 78)  BP: 122/63 (08 Jul 2018 05:30) (117/59 - 122/63)  BP(mean): --  RR: 17 (08 Jul 2018 05:30) (17 - 19)  SpO2: 99% (08 Jul 2018 05:30) (98% - 99%)    PHYSICAL EXAM:  GENERAL: NAD  HEENT: Normocephalic;  conjunctivae and sclerae clear; moist mucous membranes; decreased swallow  NECK : supple  CHEST/LUNG: Clear to auscultation bilaterally with good air entry   HEART: S1 S2  regular; no murmurs, gallops or rubs  ABDOMEN: Soft, Nontender, Nondistended; Bowel sounds present  EXTREMITIES: no cyanosis; no edema; no calf tenderness. tremors+, decreased ROM, bradykinesia  SKIN: warm and dry; no rash  NERVOUS SYSTEM:  Awake but non verbal ; no new deficits    _________________________________________________  LABS                             Care Discussed with Consultants :     Plan of care was discussed with patient and /or primary care giver; all questions and concerns were addressed and care was aligned with patient's wishes.

## 2018-07-08 NOTE — DISCHARGE NOTE ADULT - PROVIDER TOKENS
FREE:[LAST:[Bonnie],FIRST:[Alber],PHONE:[phone]],FAX:[(   )    -],ADDRESS:[[address] [place] [address]]]

## 2018-07-08 NOTE — DISCHARGE NOTE ADULT - PLAN OF CARE
prevent aspiration of food You were diagnosed with Aspiration Pneumonitis. Keep taking the pureed and honey thick liquid diet slowly with spoon with assistance. Avoid big meals. Follow up with your Primary care doctor after 2 weeks Improve quality of life You have history of Parkinson Disease. keep taking Sinemet as prescribed, continue with pureed honey thick liquid diet. Follow up with your Primary care doctor after 2 weeks Prevent Stroke and other cardiovascular events You have history of CVA. Keep taking Aspirin and Plavix as prescribed. Follow up with your Primary care doctor after 2 weeks You have   history of hyperlipidemia. Keep taking Lipitor as prescribed. Follow up with your Primary care doctor after 2 weeks BP<140/90 You have history of HTN. Keep taking Amlodipine as  prescribed .Follow up with your Primary care doctor after 2 weeks Improve Bowel motion Keep taking the senna and colace as prescribed. Follow up with your Primary care doctor after 2 weeks You were diagnosed with Aspiration Pneumonitis. Keep taking the pureed and honey thick liquid diet slowly with spoon with assistance, advance as tolerated. Avoid big meals. Follow up with your Primary care doctor after 2 weeks You have history of Parkinson Disease. keep taking Sinemet as prescribed, advance diet as tolerated. Follow up with your Primary care doctor after 2 weeks You have  history of hyperlipidemia. Keep taking Lipitor as prescribed. Follow up with your Primary care doctor after 2 weeks

## 2018-07-08 NOTE — PROGRESS NOTE ADULT - SUBJECTIVE AND OBJECTIVE BOX
Patient is a 67y old  Male who presents with a chief complaint of shortness of breath (30 Jun 2018 22:35)    Awake , alert , comfortable in bed in NAD. No SOB or cough.    INTERVAL HPI/OVERNIGHT EVENTS:      VITAL SIGNS:  T(F): 98.5 (07-08-18 @ 05:30)  HR: 58 (07-08-18 @ 05:30)  BP: 122/63 (07-08-18 @ 05:30)  RR: 17 (07-08-18 @ 05:30)  SpO2: 99% (07-08-18 @ 05:30)  Wt(kg): --  I&O's Detail          REVIEW OF SYSTEMS:    CONSTITUTIONAL:  No fevers, chills, sweats    HEENT:  Eyes:  No diplopia or blurred vision. ENT:  No earache, sore throat or runny nose.    CARDIOVASCULAR:  No pressure, squeezing, tightness, or heaviness about the chest; no palpitations.    RESPIRATORY:  Per HPI    GASTROINTESTINAL:  No abdominal pain, nausea, vomiting or diarrhea.    GENITOURINARY:  No dysuria, frequency or urgency.    NEUROLOGIC:  No paresthesias, fasciculations, seizures or weakness.    PSYCHIATRIC:  No disorder of thought or mood.      PHYSICAL EXAM:    Constitutional: Well developed and nourished  Eyes:Perrla  ENMT: normal  Neck:supple  Respiratory: CTA b/l  Cardiovascular: S1 S2 regular  Gastrointestinal: Soft, Non tender  Extremities: No edema  Vascular:normal  Neurological:Awake, alert,Ox3  Musculoskeletal:Normal      MEDICATIONS  (STANDING):  amLODIPine   Tablet 5 milliGRAM(s) Oral daily  artificial  tears Solution 1 Drop(s) Both EYES two times a day  aspirin  chewable 81 milliGRAM(s) Oral daily  atorvastatin 40 milliGRAM(s) Oral at bedtime  carbidopa/levodopa  25/100 1 Tablet(s) Oral <User Schedule>  carbidopa/levodopa  25/100 1.5 Tablet(s) Oral <User Schedule>  clopidogrel Tablet 75 milliGRAM(s) Oral daily  docusate sodium 100 milliGRAM(s) Oral two times a day  memantine 5 milliGRAM(s) Oral at bedtime  pantoprazole    Tablet 40 milliGRAM(s) Oral before breakfast  polyethylene glycol 3350 17 Gram(s) Oral daily  senna 2 Tablet(s) Oral at bedtime  traZODone 50 milliGRAM(s) Oral daily    MEDICATIONS  (PRN):      Allergies    No Known Allergies    Intolerances        LABS:                    CAPILLARY BLOOD GLUCOSE            RADIOLOGY & ADDITIONAL TESTS:    CXR:  < from: Xray Chest 1 View AP/PA (06.30.18 @ 18:06) >  IMPRESSION: Slightly increasing interstitial markings left base. Other   findings stable as above.    < end of copied text >    Ct scan chest:    ekg;    echo: Patient is a 67y old  Male who presents with a chief complaint of shortness of breath (30 Jun 2018 22:35)  Awake , alert , comfortable in bed in NAD. No SOB or cough.    INTERVAL HPI/OVERNIGHT EVENTS:      VITAL SIGNS:  T(F): 98.5 (07-08-18 @ 05:30)  HR: 58 (07-08-18 @ 05:30)  BP: 122/63 (07-08-18 @ 05:30)  RR: 17 (07-08-18 @ 05:30)  SpO2: 99% (07-08-18 @ 05:30)  Wt(kg): --  I&O's Detail          REVIEW OF SYSTEMS:    CONSTITUTIONAL:  No fevers, chills, sweats    HEENT:  Eyes:  No diplopia or blurred vision. ENT:  No earache, sore throat or runny nose.    CARDIOVASCULAR:  No pressure, squeezing, tightness, or heaviness about the chest; no palpitations.    RESPIRATORY:  Per HPI    GASTROINTESTINAL:  No abdominal pain, nausea, vomiting or diarrhea.    GENITOURINARY:  No dysuria, frequency or urgency.    NEUROLOGIC:  No paresthesias, fasciculations, seizures or weakness.    PSYCHIATRIC:  No disorder of thought or mood.      PHYSICAL EXAM:    Constitutional: Well developed and nourished  Eyes:Perrla  ENMT: normal  Neck:supple  Respiratory: CTA b/l  Cardiovascular: S1 S2 regular  Gastrointestinal: Soft, Non tender  Extremities: No edema  Vascular:normal  Neurological:Awake, alert,Ox3  Musculoskeletal:Normal      MEDICATIONS  (STANDING):  amLODIPine   Tablet 5 milliGRAM(s) Oral daily  artificial  tears Solution 1 Drop(s) Both EYES two times a day  aspirin  chewable 81 milliGRAM(s) Oral daily  atorvastatin 40 milliGRAM(s) Oral at bedtime  carbidopa/levodopa  25/100 1 Tablet(s) Oral <User Schedule>  carbidopa/levodopa  25/100 1.5 Tablet(s) Oral <User Schedule>  clopidogrel Tablet 75 milliGRAM(s) Oral daily  docusate sodium 100 milliGRAM(s) Oral two times a day  memantine 5 milliGRAM(s) Oral at bedtime  pantoprazole    Tablet 40 milliGRAM(s) Oral before breakfast  polyethylene glycol 3350 17 Gram(s) Oral daily  senna 2 Tablet(s) Oral at bedtime  traZODone 50 milliGRAM(s) Oral daily    MEDICATIONS  (PRN):      Allergies    No Known Allergies    Intolerances        LABS:                    CAPILLARY BLOOD GLUCOSE            RADIOLOGY & ADDITIONAL TESTS:    CXR:  < from: Xray Chest 1 View AP/PA (06.30.18 @ 18:06) >  IMPRESSION: Slightly increasing interstitial markings left base. Other   findings stable as above.    < end of copied text >    Ct scan chest:    ekg;    echo:

## 2018-07-09 RX ORDER — CARBIDOPA AND LEVODOPA 25; 100 MG/1; MG/1
1 TABLET ORAL
Qty: 60 | Refills: 0 | OUTPATIENT
Start: 2018-07-09 | End: 2018-08-07

## 2018-07-09 RX ORDER — CARBIDOPA AND LEVODOPA 25; 100 MG/1; MG/1
1.5 TABLET ORAL
Qty: 90 | Refills: 0 | OUTPATIENT
Start: 2018-07-09 | End: 2018-08-07

## 2018-07-09 RX ADMIN — CARBIDOPA AND LEVODOPA 1 TABLET(S): 25; 100 TABLET ORAL at 18:55

## 2018-07-09 RX ADMIN — CLOPIDOGREL BISULFATE 75 MILLIGRAM(S): 75 TABLET, FILM COATED ORAL at 11:57

## 2018-07-09 RX ADMIN — MEMANTINE HYDROCHLORIDE 5 MILLIGRAM(S): 10 TABLET ORAL at 22:30

## 2018-07-09 RX ADMIN — AMLODIPINE BESYLATE 5 MILLIGRAM(S): 2.5 TABLET ORAL at 06:01

## 2018-07-09 RX ADMIN — CARBIDOPA AND LEVODOPA 1.5 TABLET(S): 25; 100 TABLET ORAL at 06:00

## 2018-07-09 RX ADMIN — Medication 1 DROP(S): at 18:55

## 2018-07-09 RX ADMIN — Medication 50 MILLIGRAM(S): at 11:57

## 2018-07-09 RX ADMIN — POLYETHYLENE GLYCOL 3350 17 GRAM(S): 17 POWDER, FOR SOLUTION ORAL at 11:57

## 2018-07-09 RX ADMIN — Medication 1 DROP(S): at 06:01

## 2018-07-09 RX ADMIN — Medication 81 MILLIGRAM(S): at 11:57

## 2018-07-09 RX ADMIN — CARBIDOPA AND LEVODOPA 1 TABLET(S): 25; 100 TABLET ORAL at 11:57

## 2018-07-09 RX ADMIN — ATORVASTATIN CALCIUM 40 MILLIGRAM(S): 80 TABLET, FILM COATED ORAL at 22:30

## 2018-07-09 RX ADMIN — SENNA PLUS 2 TABLET(S): 8.6 TABLET ORAL at 22:30

## 2018-07-09 RX ADMIN — CARBIDOPA AND LEVODOPA 1.5 TABLET(S): 25; 100 TABLET ORAL at 17:29

## 2018-07-09 NOTE — PROGRESS NOTE ADULT - PROBLEM SELECTOR PROBLEM 6
HTN (hypertension)

## 2018-07-09 NOTE — PROGRESS NOTE ADULT - PROBLEM SELECTOR PROBLEM 5
Constipation

## 2018-07-09 NOTE — PROGRESS NOTE ADULT - ASSESSMENT
A 68 yo Male with multiple co-morbidities including Blindness of the Right Eye,  Parkinson's Disease, who was recently discharged from [facility] after treated for constipation, and now sent in to the ER by [facility] for evaluation of productive cough  and mild shortness of breath. He has no fever or chills, no nausea or  vomiting. The CXR shows  increasing interstitial markings left base. He has started on Ceftriaxone and Flagyl, and the ID consult requested to assist with further evaluation and antibiotic management.     # Suspected Aspiration pneumonia  # Procalcitonin level is low     Would recommend:  1. Monitor oFF antibiotics    2. Aspiration precaution  3. OOb to chair    -D/C planning as per primary team

## 2018-07-09 NOTE — PROGRESS NOTE ADULT - SUBJECTIVE AND OBJECTIVE BOX
Patient is a 67y old  Male who presents with a chief complaint of shortness of breath (08 Jul 2018 19:45)      Awake, alert , comfortable in bed in NAD. No SOB or cough.    INTERVAL HPI/OVERNIGHT EVENTS:      VITAL SIGNS:  T(F): 97.2 (07-09-18 @ 05:24)  HR: 86 (07-09-18 @ 05:24)  BP: 126/64 (07-09-18 @ 05:24)  RR: 18 (07-09-18 @ 05:24)  SpO2: 99% (07-09-18 @ 05:24)  Wt(kg): --  I&O's Detail          REVIEW OF SYSTEMS:    CONSTITUTIONAL:  No fevers, chills, sweats    HEENT:  Eyes:  No diplopia or blurred vision. ENT:  No earache, sore throat or runny nose.    CARDIOVASCULAR:  No pressure, squeezing, tightness, or heaviness about the chest; no palpitations.    RESPIRATORY:  Per HPI    GASTROINTESTINAL:  No abdominal pain, nausea, vomiting or diarrhea.    GENITOURINARY:  No dysuria, frequency or urgency.    NEUROLOGIC:  No paresthesias, fasciculations, seizures or weakness.    PSYCHIATRIC:  No disorder of thought or mood.      PHYSICAL EXAM:    Constitutional: Well developed and nourished  Eyes:Perrla  ENMT: normal  Neck:supple  Respiratory: CTA B/l  Cardiovascular: S1 S2 regular  Gastrointestinal: Soft, Non tender  Extremities: No edema  Vascular:normal  Neurological:Awake, alert,Ox3  Musculoskeletal:Normal      MEDICATIONS  (STANDING):  amLODIPine   Tablet 5 milliGRAM(s) Oral daily  artificial  tears Solution 1 Drop(s) Both EYES two times a day  aspirin  chewable 81 milliGRAM(s) Oral daily  atorvastatin 40 milliGRAM(s) Oral at bedtime  carbidopa/levodopa  25/100 1 Tablet(s) Oral <User Schedule>  carbidopa/levodopa  25/100 1.5 Tablet(s) Oral <User Schedule>  clopidogrel Tablet 75 milliGRAM(s) Oral daily  docusate sodium 100 milliGRAM(s) Oral two times a day  memantine 5 milliGRAM(s) Oral at bedtime  pantoprazole    Tablet 40 milliGRAM(s) Oral before breakfast  polyethylene glycol 3350 17 Gram(s) Oral daily  senna 2 Tablet(s) Oral at bedtime  traZODone 50 milliGRAM(s) Oral daily    MEDICATIONS  (PRN):      Allergies    No Known Allergies    Intolerances        LABS:                    CAPILLARY BLOOD GLUCOSE            RADIOLOGY & ADDITIONAL TESTS:    CXR:    < from: Xray Chest 1 View AP/PA (06.30.18 @ 18:06) >    INTERPRETATION:  AP semierect chest on June 30, 2018 at 5:50 PM. Patient   has chest pain and is short of breath.    Heart suggest normal size. The aorta isdilated and uncoiled.    Calcified pleural reaction in the left upper outer lung field again noted.    Slightly increased interstitial markings at the left base are noted and   these have increased somewhat from Lucille 10 of this year.    IMPRESSION: Slightly increasing interstitial markings left base. Other   findings stable as above.    < end of copied text >    Ct scan chest:    ekg;    echo: Patient is a 67y old  Male who presents with a chief complaint of shortness of breath (08 Jul 2018 19:45)  Awake, alert , comfortable in bed in NAD. No SOB or cough.    INTERVAL HPI/OVERNIGHT EVENTS:      VITAL SIGNS:  T(F): 97.2 (07-09-18 @ 05:24)  HR: 86 (07-09-18 @ 05:24)  BP: 126/64 (07-09-18 @ 05:24)  RR: 18 (07-09-18 @ 05:24)  SpO2: 99% (07-09-18 @ 05:24)  Wt(kg): --  I&O's Detail          REVIEW OF SYSTEMS:    CONSTITUTIONAL:  No fevers, chills, sweats    HEENT:  Eyes:  No diplopia or blurred vision. ENT:  No earache, sore throat or runny nose.    CARDIOVASCULAR:  No pressure, squeezing, tightness, or heaviness about the chest; no palpitations.    RESPIRATORY:  Per HPI    GASTROINTESTINAL:  No abdominal pain, nausea, vomiting or diarrhea.    GENITOURINARY:  No dysuria, frequency or urgency.    NEUROLOGIC:  No paresthesias, fasciculations, seizures or weakness.    PSYCHIATRIC:  No disorder of thought or mood.      PHYSICAL EXAM:    Constitutional: Well developed and nourished  Eyes:Perrla  ENMT: normal  Neck:supple  Respiratory: CTA B/l  Cardiovascular: S1 S2 regular  Gastrointestinal: Soft, Non tender  Extremities: No edema  Vascular:normal  Neurological:Awake, alert  Musculoskeletal:Normal      MEDICATIONS  (STANDING):  amLODIPine   Tablet 5 milliGRAM(s) Oral daily  artificial  tears Solution 1 Drop(s) Both EYES two times a day  aspirin  chewable 81 milliGRAM(s) Oral daily  atorvastatin 40 milliGRAM(s) Oral at bedtime  carbidopa/levodopa  25/100 1 Tablet(s) Oral <User Schedule>  carbidopa/levodopa  25/100 1.5 Tablet(s) Oral <User Schedule>  clopidogrel Tablet 75 milliGRAM(s) Oral daily  docusate sodium 100 milliGRAM(s) Oral two times a day  memantine 5 milliGRAM(s) Oral at bedtime  pantoprazole    Tablet 40 milliGRAM(s) Oral before breakfast  polyethylene glycol 3350 17 Gram(s) Oral daily  senna 2 Tablet(s) Oral at bedtime  traZODone 50 milliGRAM(s) Oral daily    MEDICATIONS  (PRN):      Allergies    No Known Allergies    Intolerances        LABS:                    CAPILLARY BLOOD GLUCOSE            RADIOLOGY & ADDITIONAL TESTS:    CXR:    < from: Xray Chest 1 View AP/PA (06.30.18 @ 18:06) >    INTERPRETATION:  AP semierect chest on June 30, 2018 at 5:50 PM. Patient   has chest pain and is short of breath.    Heart suggest normal size. The aorta isdilated and uncoiled.    Calcified pleural reaction in the left upper outer lung field again noted.    Slightly increased interstitial markings at the left base are noted and   these have increased somewhat from Lucille 10 of this year.    IMPRESSION: Slightly increasing interstitial markings left base. Other   findings stable as above.    < end of copied text >    Ct scan chest:    ekg;    echo:

## 2018-07-09 NOTE — PROGRESS NOTE ADULT - ASSESSMENT
Patient is a 67y old  Male who presents with a chief complaint of shortness of breath (30 Jun 2018 22:35)  patient is calm and stable on bed. patient accepting pureed - honey thick liquids well , swallowing improved. Nurse practitioner Spoke with patient's guardian (Darcy Chadwick: [phone] / [phone] x [phone]) and  (Salena Alvarez) on the phone. Discussed status. Aware pt passed swallow re-evaluation. Reports she does not have medical decision making power - agreeable to 2-physician [facility] as appropriate.patient is ready to discharge, patient's home aid came but due to home placement problem, pt will be discharged tmrw

## 2018-07-09 NOTE — PROGRESS NOTE ADULT - PROBLEM SELECTOR PLAN 5
C/w senna , docusate and PEG
C/w senna , docusate and PEG
C/w jerald , docusate stable
C/w keily marques dc planning to rehab
C/w senna , docusate and PEG
C/w senna , docusate and PEG for dysphagia poor oral intake

## 2018-07-09 NOTE — PROGRESS NOTE ADULT - PROBLEM SELECTOR PLAN 6
C/w Amlodipine

## 2018-07-09 NOTE — PROGRESS NOTE ADULT - SUBJECTIVE AND OBJECTIVE BOX
Patient is seen and examined at the bed side, is afebrile. He is doing better, no new events.      REVIEW OF SYSTEMS: Unable to obtain due to Mental status      ALLERGIES: NKDA      Vital Signs Last 24 Hrs  T(C): 36.6 (09 Jul 2018 14:24), Max: 37.3 (08 Jul 2018 21:54)  T(F): 97.8 (09 Jul 2018 14:24), Max: 99.1 (08 Jul 2018 21:54)  HR: 86 (09 Jul 2018 14:24) (66 - 86)  BP: 131/66 (09 Jul 2018 14:24) (122/67 - 131/66)  BP(mean): --  RR: 18 (09 Jul 2018 14:24) (18 - 18)  SpO2: 97% (09 Jul 2018 14:24) (97% - 99%)        PHYSICAL EXAM:  GENERAL: Not in distress  CHEST/LUNG: Air entry  bilaterally  HEART: s1 and s2 present  ABDOMEN: Nontender, Nondistended  EXTREMITIES:  No pedal  edema  CNS: Awake and alert but nonverbal           LABS:   No new labs                          12.6   6.8   )-----------( 170      ( 05 Jul 2018 07:00 )             38.5                  12.7   6.4   )-----------( 191      ( 03 Jul 2018 06:17 )             39.6           07-06      147<H>  |  111<H>  |  12  ----------------------------<  78  3.6   |  27  |  0.73    Ca    8.9      06 Jul 2018 08:08  Phos  3.1     07-05  Mg     2.1     07-05      07-05    148<H>  |  113<H>  |  10  ----------------------------<  85  3.7   |  28  |  0.70    Ca    8.5      05 Jul 2018 07:00  Phos  3.1     07-05  Mg     2.1     07-05          MEDICATIONS  (STANDING):  amLODIPine   Tablet 5 milliGRAM(s) Oral daily  artificial  tears Solution 1 Drop(s) Both EYES two times a day  aspirin  chewable 81 milliGRAM(s) Oral daily  atorvastatin 40 milliGRAM(s) Oral at bedtime  carbidopa/levodopa  25/100 1 Tablet(s) Oral <User Schedule>  carbidopa/levodopa  25/100 1.5 Tablet(s) Oral <User Schedule>  clopidogrel Tablet 75 milliGRAM(s) Oral daily  docusate sodium 100 milliGRAM(s) Oral two times a day  memantine 5 milliGRAM(s) Oral at bedtime  pantoprazole    Tablet 40 milliGRAM(s) Oral before breakfast  polyethylene glycol 3350 17 Gram(s) Oral daily  senna 2 Tablet(s) Oral at bedtime  traZODone 50 milliGRAM(s) Oral daily    MEDICATIONS  (PRN):        RADIOLOGY & ADDITIONAL TESTS:    6/30/18: Xray Chest 1 View AP/PA (06.30.18 @ 18:06) : Slightly increasing interstitial markings left base.       MICROBIOLOGY DATA:        Rapid Respiratory Viral Panel (07.01.18 @ 22:02)    Rapid RVP Result: NotDetec: The Film[employer] RVP Rapid uses polymerase chain reaction (PCR) and melt  curve analysis to screen for adenovirus; coronavirus HKU1, NL63, 229E,  OC43; human metapneumovirus (hMPV); human enterovirus/rhinovirus  (Entero/RV); influenza A; influenza A/H1;influenza A/H3; influenza  A/H1-2009; influenza B; parainfluenza viruses 1, 2, 3, 4; respiratory  syncytial virus; Bordetella pertussis; Mycoplasma pneumoniae; and  Chlamydophila pneumoniae.    Culture - Blood (07.01.18 @ 00:12)    Specimen Source: .Blood Blood    Culture Results:   No growth to date.    Culture - Blood (07.01.18 @ 00:12)    Specimen Source: .Blood Blood    Culture Results:   No growth to date.

## 2018-07-09 NOTE — PROGRESS NOTE ADULT - SUBJECTIVE AND OBJECTIVE BOX
PGY 1 Note discussed with supervising resident and primary attending    Patient is a 67y old  Male who presents with a chief complaint of shortness of breath (30 Jun 2018 22:35)      INTERVAL HPI/OVERNIGHT EVENTS: offers no new complaints; patient is stable, accepting pureed diet well    MEDICATIONS  (STANDING):  amLODIPine   Tablet 5 milliGRAM(s) Oral daily  artificial  tears Solution 1 Drop(s) Both EYES two times a day  aspirin  chewable 81 milliGRAM(s) Oral daily  atorvastatin 40 milliGRAM(s) Oral at bedtime  carbidopa/levodopa  25/100 1 Tablet(s) Oral <User Schedule>  carbidopa/levodopa  25/100 1.5 Tablet(s) Oral <User Schedule>  clopidogrel Tablet 75 milliGRAM(s) Oral daily  docusate sodium 100 milliGRAM(s) Oral two times a day  memantine 5 milliGRAM(s) Oral at bedtime  pantoprazole    Tablet 40 milliGRAM(s) Oral before breakfast  polyethylene glycol 3350 17 Gram(s) Oral daily  senna 2 Tablet(s) Oral at bedtime  traZODone 50 milliGRAM(s) Oral daily    MEDICATIONS  (PRN):      REVIEW OF SYSTEMS:    CONSTITUTIONAL: No fever,   EYES: blind  right eye   NECK: No pain or stiffness  RESPIRATORY: No cough; No shortness of breath  CARDIOVASCULAR: No chest pain, no palpitations  GASTROINTESTINAL: No pain. No nausea or vomiting; No diarrhea, decreased swallowing   NEUROLOGICAL: No headache or numbness, tremors+, decreased movements, non verbel  MUSCULOSKELETAL: No joint pain, no muscle pain,   GENITOURINARY: no dysuria, no frequency, no hesitancy  PSYCHIATRY: no depression , no anxiety, parkinson disease+  ALL OTHER  ROS negative        Vital Signs Last 24 Hrs  T(C): 36.6 (09 Jul 2018 14:24), Max: 37.3 (08 Jul 2018 21:54)  T(F): 97.8 (09 Jul 2018 14:24), Max: 99.1 (08 Jul 2018 21:54)  HR: 86 (09 Jul 2018 14:24) (66 - 86)  BP: 131/66 (09 Jul 2018 14:24) (122/67 - 131/66)  BP(mean): --  RR: 18 (09 Jul 2018 14:24) (18 - 18)  SpO2: 97% (09 Jul 2018 14:24) (97% - 99%)    PHYSICAL EXAM:  GENERAL: NAD  HEENT: Normocephalic;  conjunctivae and sclerae clear; moist mucous membranes; decreased swallow  NECK : supple  CHEST/LUNG: Clear to auscultation bilaterally with good air entry   HEART: S1 S2  regular; no murmurs, gallops or rubs  ABDOMEN: Soft, Nontender, Nondistended; Bowel sounds present  EXTREMITIES: no cyanosis; no edema; no calf tenderness. tremors+, decreased ROM, bradykinesia  SKIN: warm and dry; no rash  NERVOUS SYSTEM:  Awake but non verbal ; no new deficits    _________________________________________________  LABS                             Care Discussed with Consultants :     Plan of care was discussed with patient and /or primary care giver; all questions and concerns were addressed and care was aligned with patient's wishes.

## 2018-07-10 VITALS
RESPIRATION RATE: 17 BRPM | TEMPERATURE: 98 F | OXYGEN SATURATION: 99 % | SYSTOLIC BLOOD PRESSURE: 130 MMHG | HEART RATE: 84 BPM | DIASTOLIC BLOOD PRESSURE: 84 MMHG

## 2018-07-10 LAB
ANION GAP SERPL CALC-SCNC: 4 MMOL/L — LOW (ref 5–17)
BUN SERPL-MCNC: 16 MG/DL — SIGNIFICANT CHANGE UP (ref 7–18)
CALCIUM SERPL-MCNC: 8.9 MG/DL — SIGNIFICANT CHANGE UP (ref 8.4–10.5)
CHLORIDE SERPL-SCNC: 109 MMOL/L — HIGH (ref 96–108)
CO2 SERPL-SCNC: 32 MMOL/L — HIGH (ref 22–31)
CREAT SERPL-MCNC: 0.72 MG/DL — SIGNIFICANT CHANGE UP (ref 0.5–1.3)
GLUCOSE SERPL-MCNC: 92 MG/DL — SIGNIFICANT CHANGE UP (ref 70–99)
HCT VFR BLD CALC: 40.5 % — SIGNIFICANT CHANGE UP (ref 39–50)
HGB BLD-MCNC: 13 G/DL — SIGNIFICANT CHANGE UP (ref 13–17)
MCHC RBC-ENTMCNC: 29.8 PG — SIGNIFICANT CHANGE UP (ref 27–34)
MCHC RBC-ENTMCNC: 32.1 GM/DL — SIGNIFICANT CHANGE UP (ref 32–36)
MCV RBC AUTO: 92.8 FL — SIGNIFICANT CHANGE UP (ref 80–100)
PLATELET # BLD AUTO: 183 K/UL — SIGNIFICANT CHANGE UP (ref 150–400)
POTASSIUM SERPL-MCNC: 4.1 MMOL/L — SIGNIFICANT CHANGE UP (ref 3.5–5.3)
POTASSIUM SERPL-SCNC: 4.1 MMOL/L — SIGNIFICANT CHANGE UP (ref 3.5–5.3)
RBC # BLD: 4.37 M/UL — SIGNIFICANT CHANGE UP (ref 4.2–5.8)
RBC # FLD: 13.3 % — SIGNIFICANT CHANGE UP (ref 10.3–14.5)
SODIUM SERPL-SCNC: 145 MMOL/L — SIGNIFICANT CHANGE UP (ref 135–145)
WBC # BLD: 6.9 K/UL — SIGNIFICANT CHANGE UP (ref 3.8–10.5)
WBC # FLD AUTO: 6.9 K/UL — SIGNIFICANT CHANGE UP (ref 3.8–10.5)

## 2018-07-10 PROCEDURE — 82550 ASSAY OF CK (CPK): CPT

## 2018-07-10 PROCEDURE — 82553 CREATINE MB FRACTION: CPT

## 2018-07-10 PROCEDURE — 84145 PROCALCITONIN (PCT): CPT

## 2018-07-10 PROCEDURE — 85610 PROTHROMBIN TIME: CPT

## 2018-07-10 PROCEDURE — 87040 BLOOD CULTURE FOR BACTERIA: CPT

## 2018-07-10 PROCEDURE — 97530 THERAPEUTIC ACTIVITIES: CPT

## 2018-07-10 PROCEDURE — 96374 THER/PROPH/DIAG INJ IV PUSH: CPT

## 2018-07-10 PROCEDURE — 71045 X-RAY EXAM CHEST 1 VIEW: CPT

## 2018-07-10 PROCEDURE — 97110 THERAPEUTIC EXERCISES: CPT

## 2018-07-10 PROCEDURE — 84443 ASSAY THYROID STIM HORMONE: CPT

## 2018-07-10 PROCEDURE — 83036 HEMOGLOBIN GLYCOSYLATED A1C: CPT

## 2018-07-10 PROCEDURE — 80048 BASIC METABOLIC PNL TOTAL CA: CPT

## 2018-07-10 PROCEDURE — 85027 COMPLETE CBC AUTOMATED: CPT

## 2018-07-10 PROCEDURE — 83735 ASSAY OF MAGNESIUM: CPT

## 2018-07-10 PROCEDURE — 93005 ELECTROCARDIOGRAM TRACING: CPT

## 2018-07-10 PROCEDURE — 87581 M.PNEUMON DNA AMP PROBE: CPT

## 2018-07-10 PROCEDURE — 97116 GAIT TRAINING THERAPY: CPT

## 2018-07-10 PROCEDURE — 87798 DETECT AGENT NOS DNA AMP: CPT

## 2018-07-10 PROCEDURE — 83690 ASSAY OF LIPASE: CPT

## 2018-07-10 PROCEDURE — 83880 ASSAY OF NATRIURETIC PEPTIDE: CPT

## 2018-07-10 PROCEDURE — 87633 RESP VIRUS 12-25 TARGETS: CPT

## 2018-07-10 PROCEDURE — 87486 CHLMYD PNEUM DNA AMP PROBE: CPT

## 2018-07-10 PROCEDURE — 92610 EVALUATE SWALLOWING FUNCTION: CPT

## 2018-07-10 PROCEDURE — 84484 ASSAY OF TROPONIN QUANT: CPT

## 2018-07-10 PROCEDURE — 82607 VITAMIN B-12: CPT

## 2018-07-10 PROCEDURE — 85730 THROMBOPLASTIN TIME PARTIAL: CPT

## 2018-07-10 PROCEDURE — 82009 KETONE BODYS QUAL: CPT

## 2018-07-10 PROCEDURE — 96375 TX/PRO/DX INJ NEW DRUG ADDON: CPT

## 2018-07-10 PROCEDURE — 83605 ASSAY OF LACTIC ACID: CPT

## 2018-07-10 PROCEDURE — 80053 COMPREHEN METABOLIC PANEL: CPT

## 2018-07-10 PROCEDURE — 82746 ASSAY OF FOLIC ACID SERUM: CPT

## 2018-07-10 PROCEDURE — 99285 EMERGENCY DEPT VISIT HI MDM: CPT | Mod: 25

## 2018-07-10 PROCEDURE — 84100 ASSAY OF PHOSPHORUS: CPT

## 2018-07-10 RX ADMIN — PANTOPRAZOLE SODIUM 40 MILLIGRAM(S): 20 TABLET, DELAYED RELEASE ORAL at 05:51

## 2018-07-10 RX ADMIN — Medication 100 MILLIGRAM(S): at 05:52

## 2018-07-10 RX ADMIN — CARBIDOPA AND LEVODOPA 1.5 TABLET(S): 25; 100 TABLET ORAL at 05:51

## 2018-07-10 RX ADMIN — AMLODIPINE BESYLATE 5 MILLIGRAM(S): 2.5 TABLET ORAL at 05:50

## 2018-07-10 RX ADMIN — Medication 1 DROP(S): at 05:52

## 2018-07-10 NOTE — PROGRESS NOTE ADULT - PROBLEM SELECTOR PROBLEM 2
Parkinson's disease
R/O Parkinson's disease
Parkinson's disease
R/O Parkinson's disease
Parkinson's disease

## 2018-07-10 NOTE — PROGRESS NOTE ADULT - PROBLEM SELECTOR PLAN 2
cont meds  DVT PPX.
c/w sinemet 4 times /day  decrease swallow   .continue on pureed- honey thick liquid diet
cont meds  DVT PPX.
c/w sinemet 4 times /day  swallow improved   continue on pureed- honey thick liquid diet
cont meds  DVT PPX.
c/w sinemet 4 times /day
c/w sinemet 4 times /day
c/w sinemet 4 times /day  decrease swallow   .continue on pureed- honey thick liquid diet  pt passed speech and swallow
c/w sinemet 4 times /day  swallow improved   continue on pureed- honey thick liquid diet
c/w sinemet three times /day
cont meds  DVT PPX.

## 2018-07-10 NOTE — PROGRESS NOTE ADULT - PROBLEM SELECTOR PLAN 3
supportive care  PT/Rehab.
c/w aspirin and plavix daily.
supportive care  PT/Rehab.
c/w aspirin and plavix daily.
supportive care  PT/Rehab.
Plan: c/w aspirin and plavix daily.
c/w aspirin and plavix daily
c/w aspirin and plavix daily.
supportive care  PT/Rehab.

## 2018-07-10 NOTE — PROGRESS NOTE ADULT - PROBLEM SELECTOR PROBLEM 1
Aspiration pneumonia
R/O Aspiration pneumonia
Shortness of breath
Aspiration pneumonia

## 2018-07-10 NOTE — PROGRESS NOTE ADULT - PROVIDER SPECIALTY LIST ADULT
Gastroenterology
Infectious Disease
Internal Medicine
Pulmonology
Infectious Disease
Pulmonology
Internal Medicine
Internal Medicine

## 2018-07-10 NOTE — PROGRESS NOTE ADULT - PROBLEM SELECTOR PROBLEM 4
HTN (hypertension)
HLD (hyperlipidemia)
HTN (hypertension)
HLD (hyperlipidemia)
HTN (hypertension)
HLD (hyperlipidemia)
HTN (hypertension)
Need for prophylactic measure

## 2018-07-10 NOTE — PROGRESS NOTE ADULT - SUBJECTIVE AND OBJECTIVE BOX
Patient is a 67y old  Male who presents with a chief complaint of shortness of breath (08 Jul 2018 19:45)  Awake, alert, comfortable in bed in NAD. Clinically improved    INTERVAL HPI/OVERNIGHT EVENTS:      VITAL SIGNS:  T(F): 97.8 (07-10-18 @ 11:16)  HR: 84 (07-10-18 @ 11:16)  BP: 130/84 (07-10-18 @ 11:16)  RR: 17 (07-10-18 @ 11:16)  SpO2: 99% (07-10-18 @ 11:16)  Wt(kg): --  I&O's Detail          REVIEW OF SYSTEMS:    CONSTITUTIONAL:  No fevers, chills, sweats    HEENT:  Eyes:  No diplopia or blurred vision. ENT:  No earache, sore throat or runny nose.    CARDIOVASCULAR:  No pressure, squeezing, tightness, or heaviness about the chest; no palpitations.    RESPIRATORY:  Per HPI    GASTROINTESTINAL:  No abdominal pain, nausea, vomiting or diarrhea.    GENITOURINARY:  No dysuria, frequency or urgency.    NEUROLOGIC:  No paresthesias, fasciculations, seizures or weakness.    PSYCHIATRIC:  No disorder of thought or mood.      PHYSICAL EXAM:    Constitutional: Well developed and nourished  Eyes:Perrla  ENMT: normal  Neck:supple  Respiratory: good air entry  Cardiovascular: S1 S2 regular  Gastrointestinal: Soft, Non tender  Extremities: No edema  Vascular:normal  Neurological:Awake, alert  Musculoskeletal:Normal      MEDICATIONS  (STANDING):  amLODIPine   Tablet 5 milliGRAM(s) Oral daily  artificial  tears Solution 1 Drop(s) Both EYES two times a day  aspirin  chewable 81 milliGRAM(s) Oral daily  atorvastatin 40 milliGRAM(s) Oral at bedtime  carbidopa/levodopa  25/100 1 Tablet(s) Oral <User Schedule>  carbidopa/levodopa  25/100 1.5 Tablet(s) Oral <User Schedule>  clopidogrel Tablet 75 milliGRAM(s) Oral daily  docusate sodium 100 milliGRAM(s) Oral two times a day  memantine 5 milliGRAM(s) Oral at bedtime  pantoprazole    Tablet 40 milliGRAM(s) Oral before breakfast  polyethylene glycol 3350 17 Gram(s) Oral daily  senna 2 Tablet(s) Oral at bedtime  traZODone 50 milliGRAM(s) Oral daily    MEDICATIONS  (PRN):      Allergies    No Known Allergies    Intolerances        LABS:                        13.0   6.9   )-----------( 183      ( 10 Jul 2018 07:00 )             40.5     07-10    145  |  109<H>  |  16  ----------------------------<  92  4.1   |  32<H>  |  0.72    Ca    8.9      10 Jul 2018 07:00                CAPILLARY BLOOD GLUCOSE            RADIOLOGY & ADDITIONAL TESTS:    CXR:    Ct scan chest:    ekg;    echo:

## 2018-07-10 NOTE — PROGRESS NOTE ADULT - PROBLEM SELECTOR PROBLEM 3
CVA (cerebral vascular accident)

## 2018-07-10 NOTE — PROGRESS NOTE ADULT - PROBLEM SELECTOR PLAN 4
monitor BP  cont meds.   Cardio follow up
monitor BP  cont meds.   Cardio follow up.
c/w Lipitor
monitor BP  cont meds.   Cardio follow up.
c/w Lipitor
monitor BP  cont meds.   Cardio follow up.
monitor BP  cont meds.
monitor BP  cont meds.   Cardio follow up.
IMPROVE score of 2 { age >60 and
c/w Lipitor

## 2018-07-29 ENCOUNTER — EMERGENCY (EMERGENCY)
Facility: HOSPITAL | Age: 68
LOS: 1 days | Discharge: HOME | End: 2018-07-29
Attending: EMERGENCY MEDICINE
Payer: MEDICARE

## 2018-07-29 VITALS
SYSTOLIC BLOOD PRESSURE: 127 MMHG | TEMPERATURE: 98 F | HEART RATE: 90 BPM | HEIGHT: 68 IN | RESPIRATION RATE: 17 BRPM | WEIGHT: 130.07 LBS | OXYGEN SATURATION: 99 % | DIASTOLIC BLOOD PRESSURE: 77 MMHG

## 2018-07-29 DIAGNOSIS — Z98.89 OTHER SPECIFIED POSTPROCEDURAL STATES: Chronic | ICD-10-CM

## 2018-07-29 LAB
ALBUMIN SERPL ELPH-MCNC: 4 G/DL — SIGNIFICANT CHANGE UP (ref 3.5–5)
ALP SERPL-CCNC: 107 U/L — SIGNIFICANT CHANGE UP (ref 40–120)
ALT FLD-CCNC: 10 U/L DA — SIGNIFICANT CHANGE UP (ref 10–60)
ANION GAP SERPL CALC-SCNC: 5 MMOL/L — SIGNIFICANT CHANGE UP (ref 5–17)
AST SERPL-CCNC: 12 U/L — SIGNIFICANT CHANGE UP (ref 10–40)
BASOPHILS # BLD AUTO: 0.1 K/UL — SIGNIFICANT CHANGE UP (ref 0–0.2)
BASOPHILS NFR BLD AUTO: 1.1 % — SIGNIFICANT CHANGE UP (ref 0–2)
BILIRUB SERPL-MCNC: 0.4 MG/DL — SIGNIFICANT CHANGE UP (ref 0.2–1.2)
BUN SERPL-MCNC: 16 MG/DL — SIGNIFICANT CHANGE UP (ref 7–18)
CALCIUM SERPL-MCNC: 9 MG/DL — SIGNIFICANT CHANGE UP (ref 8.4–10.5)
CHLORIDE SERPL-SCNC: 107 MMOL/L — SIGNIFICANT CHANGE UP (ref 96–108)
CO2 SERPL-SCNC: 29 MMOL/L — SIGNIFICANT CHANGE UP (ref 22–31)
CREAT SERPL-MCNC: 1.02 MG/DL — SIGNIFICANT CHANGE UP (ref 0.5–1.3)
EOSINOPHIL # BLD AUTO: 0.1 K/UL — SIGNIFICANT CHANGE UP (ref 0–0.5)
EOSINOPHIL NFR BLD AUTO: 1.7 % — SIGNIFICANT CHANGE UP (ref 0–6)
GLUCOSE SERPL-MCNC: 91 MG/DL — SIGNIFICANT CHANGE UP (ref 70–99)
HCT VFR BLD CALC: 44.6 % — SIGNIFICANT CHANGE UP (ref 39–50)
HGB BLD-MCNC: 14.6 G/DL — SIGNIFICANT CHANGE UP (ref 13–17)
LACTATE SERPL-SCNC: 1.7 MMOL/L — SIGNIFICANT CHANGE UP (ref 0.7–2)
LYMPHOCYTES # BLD AUTO: 2.3 K/UL — SIGNIFICANT CHANGE UP (ref 1–3.3)
LYMPHOCYTES # BLD AUTO: 32.5 % — SIGNIFICANT CHANGE UP (ref 13–44)
MAGNESIUM SERPL-MCNC: 2.1 MG/DL — SIGNIFICANT CHANGE UP (ref 1.6–2.6)
MCHC RBC-ENTMCNC: 29.9 PG — SIGNIFICANT CHANGE UP (ref 27–34)
MCHC RBC-ENTMCNC: 32.8 GM/DL — SIGNIFICANT CHANGE UP (ref 32–36)
MCV RBC AUTO: 91.1 FL — SIGNIFICANT CHANGE UP (ref 80–100)
MONOCYTES # BLD AUTO: 0.5 K/UL — SIGNIFICANT CHANGE UP (ref 0–0.9)
MONOCYTES NFR BLD AUTO: 7.1 % — SIGNIFICANT CHANGE UP (ref 2–14)
NEUTROPHILS # BLD AUTO: 4.2 K/UL — SIGNIFICANT CHANGE UP (ref 1.8–7.4)
NEUTROPHILS NFR BLD AUTO: 57.6 % — SIGNIFICANT CHANGE UP (ref 43–77)
NT-PROBNP SERPL-SCNC: 34 PG/ML — SIGNIFICANT CHANGE UP (ref 0–125)
PLATELET # BLD AUTO: 237 K/UL — SIGNIFICANT CHANGE UP (ref 150–400)
POTASSIUM SERPL-MCNC: 3.7 MMOL/L — SIGNIFICANT CHANGE UP (ref 3.5–5.3)
POTASSIUM SERPL-SCNC: 3.7 MMOL/L — SIGNIFICANT CHANGE UP (ref 3.5–5.3)
PROT SERPL-MCNC: 7.5 G/DL — SIGNIFICANT CHANGE UP (ref 6–8.3)
RBC # BLD: 4.9 M/UL — SIGNIFICANT CHANGE UP (ref 4.2–5.8)
RBC # FLD: 13 % — SIGNIFICANT CHANGE UP (ref 10.3–14.5)
SODIUM SERPL-SCNC: 141 MMOL/L — SIGNIFICANT CHANGE UP (ref 135–145)
WBC # BLD: 7.2 K/UL — SIGNIFICANT CHANGE UP (ref 3.8–10.5)
WBC # FLD AUTO: 7.2 K/UL — SIGNIFICANT CHANGE UP (ref 3.8–10.5)

## 2018-07-29 PROCEDURE — 71045 X-RAY EXAM CHEST 1 VIEW: CPT

## 2018-07-29 PROCEDURE — 83605 ASSAY OF LACTIC ACID: CPT

## 2018-07-29 PROCEDURE — 87040 BLOOD CULTURE FOR BACTERIA: CPT

## 2018-07-29 PROCEDURE — 80053 COMPREHEN METABOLIC PANEL: CPT

## 2018-07-29 PROCEDURE — 99285 EMERGENCY DEPT VISIT HI MDM: CPT

## 2018-07-29 PROCEDURE — 85027 COMPLETE CBC AUTOMATED: CPT

## 2018-07-29 PROCEDURE — 99283 EMERGENCY DEPT VISIT LOW MDM: CPT | Mod: 25

## 2018-07-29 PROCEDURE — 83880 ASSAY OF NATRIURETIC PEPTIDE: CPT

## 2018-07-29 PROCEDURE — 83735 ASSAY OF MAGNESIUM: CPT

## 2018-07-29 PROCEDURE — 71045 X-RAY EXAM CHEST 1 VIEW: CPT | Mod: 26

## 2018-07-29 NOTE — ED PROVIDER NOTE - MUSCULOSKELETAL, MLM
Spine appears normal, range of motion is not limited, no muscle or joint tenderness, gaby legs atrophy, generalized stiffness

## 2018-07-29 NOTE — ED PROVIDER NOTE - OBJECTIVE STATEMENT
67 year old M Pt w/ PMHx of HTN presents to ED c/o cough, shortness of breath today. Pt was recently admitted to the hospital June 30th for respiratory symptoms. Pt denies fever, vomting and all other complaints. NKDA

## 2018-07-30 VITALS
HEART RATE: 78 BPM | OXYGEN SATURATION: 99 % | DIASTOLIC BLOOD PRESSURE: 78 MMHG | TEMPERATURE: 98 F | SYSTOLIC BLOOD PRESSURE: 148 MMHG | RESPIRATION RATE: 18 BRPM

## 2018-07-30 PROBLEM — E78.5 HYPERLIPIDEMIA, UNSPECIFIED: Chronic | Status: ACTIVE | Noted: 2018-06-05

## 2018-07-30 PROBLEM — G20 PARKINSON'S DISEASE: Chronic | Status: ACTIVE | Noted: 2018-06-05

## 2018-07-30 PROBLEM — I10 ESSENTIAL (PRIMARY) HYPERTENSION: Chronic | Status: ACTIVE | Noted: 2018-06-05

## 2018-08-03 LAB
CULTURE RESULTS: SIGNIFICANT CHANGE UP
CULTURE RESULTS: SIGNIFICANT CHANGE UP
SPECIMEN SOURCE: SIGNIFICANT CHANGE UP
SPECIMEN SOURCE: SIGNIFICANT CHANGE UP

## 2018-08-21 ENCOUNTER — INPATIENT (INPATIENT)
Facility: HOSPITAL | Age: 68
LOS: 6 days | Discharge: HOME | DRG: 178 | End: 2018-08-28
Attending: INTERNAL MEDICINE | Admitting: INTERNAL MEDICINE
Payer: MEDICARE

## 2018-08-21 VITALS
SYSTOLIC BLOOD PRESSURE: 146 MMHG | RESPIRATION RATE: 16 BRPM | HEIGHT: 69 IN | DIASTOLIC BLOOD PRESSURE: 75 MMHG | WEIGHT: 130.07 LBS | OXYGEN SATURATION: 99 % | HEART RATE: 87 BPM

## 2018-08-21 DIAGNOSIS — Z98.89 OTHER SPECIFIED POSTPROCEDURAL STATES: Chronic | ICD-10-CM

## 2018-08-21 DIAGNOSIS — J69.0 PNEUMONITIS DUE TO INHALATION OF FOOD AND VOMIT: ICD-10-CM

## 2018-08-21 LAB
ACETONE SERPL-MCNC: NEGATIVE — SIGNIFICANT CHANGE UP
ALBUMIN SERPL ELPH-MCNC: 4.1 G/DL — SIGNIFICANT CHANGE UP (ref 3.5–5)
ALP SERPL-CCNC: 121 U/L — HIGH (ref 40–120)
ALT FLD-CCNC: 12 U/L DA — SIGNIFICANT CHANGE UP (ref 10–60)
ANION GAP SERPL CALC-SCNC: 8 MMOL/L — SIGNIFICANT CHANGE UP (ref 5–17)
APPEARANCE UR: CLEAR — SIGNIFICANT CHANGE UP
APTT BLD: 32.3 SEC — SIGNIFICANT CHANGE UP (ref 27.5–37.4)
AST SERPL-CCNC: 16 U/L — SIGNIFICANT CHANGE UP (ref 10–40)
BASOPHILS # BLD AUTO: 0.1 K/UL — SIGNIFICANT CHANGE UP (ref 0–0.2)
BASOPHILS NFR BLD AUTO: 0.6 % — SIGNIFICANT CHANGE UP (ref 0–2)
BILIRUB SERPL-MCNC: 0.5 MG/DL — SIGNIFICANT CHANGE UP (ref 0.2–1.2)
BILIRUB UR-MCNC: NEGATIVE — SIGNIFICANT CHANGE UP
BUN SERPL-MCNC: 12 MG/DL — SIGNIFICANT CHANGE UP (ref 7–18)
CALCIUM SERPL-MCNC: 9 MG/DL — SIGNIFICANT CHANGE UP (ref 8.4–10.5)
CHLORIDE SERPL-SCNC: 108 MMOL/L — SIGNIFICANT CHANGE UP (ref 96–108)
CO2 SERPL-SCNC: 28 MMOL/L — SIGNIFICANT CHANGE UP (ref 22–31)
COLOR SPEC: YELLOW — SIGNIFICANT CHANGE UP
CREAT SERPL-MCNC: 0.8 MG/DL — SIGNIFICANT CHANGE UP (ref 0.5–1.3)
DIFF PNL FLD: NEGATIVE — SIGNIFICANT CHANGE UP
EOSINOPHIL # BLD AUTO: 0.2 K/UL — SIGNIFICANT CHANGE UP (ref 0–0.5)
EOSINOPHIL NFR BLD AUTO: 1.8 % — SIGNIFICANT CHANGE UP (ref 0–6)
GLUCOSE SERPL-MCNC: 86 MG/DL — SIGNIFICANT CHANGE UP (ref 70–99)
GLUCOSE UR QL: NEGATIVE — SIGNIFICANT CHANGE UP
HCT VFR BLD CALC: 44.3 % — SIGNIFICANT CHANGE UP (ref 39–50)
HGB BLD-MCNC: 14.4 G/DL — SIGNIFICANT CHANGE UP (ref 13–17)
INR BLD: 1.05 RATIO — SIGNIFICANT CHANGE UP (ref 0.88–1.16)
KETONES UR-MCNC: NEGATIVE — SIGNIFICANT CHANGE UP
LACTATE SERPL-SCNC: 1.2 MMOL/L — SIGNIFICANT CHANGE UP (ref 0.7–2)
LEUKOCYTE ESTERASE UR-ACNC: NEGATIVE — SIGNIFICANT CHANGE UP
LYMPHOCYTES # BLD AUTO: 2.1 K/UL — SIGNIFICANT CHANGE UP (ref 1–3.3)
LYMPHOCYTES # BLD AUTO: 20.1 % — SIGNIFICANT CHANGE UP (ref 13–44)
MAGNESIUM SERPL-MCNC: 2.2 MG/DL — SIGNIFICANT CHANGE UP (ref 1.6–2.6)
MCHC RBC-ENTMCNC: 29.6 PG — SIGNIFICANT CHANGE UP (ref 27–34)
MCHC RBC-ENTMCNC: 32.4 GM/DL — SIGNIFICANT CHANGE UP (ref 32–36)
MCV RBC AUTO: 91.4 FL — SIGNIFICANT CHANGE UP (ref 80–100)
MONOCYTES # BLD AUTO: 0.7 K/UL — SIGNIFICANT CHANGE UP (ref 0–0.9)
MONOCYTES NFR BLD AUTO: 6.5 % — SIGNIFICANT CHANGE UP (ref 2–14)
NEUTROPHILS # BLD AUTO: 7.4 K/UL — SIGNIFICANT CHANGE UP (ref 1.8–7.4)
NEUTROPHILS NFR BLD AUTO: 71 % — SIGNIFICANT CHANGE UP (ref 43–77)
NITRITE UR-MCNC: NEGATIVE — SIGNIFICANT CHANGE UP
NT-PROBNP SERPL-SCNC: 76 PG/ML — SIGNIFICANT CHANGE UP (ref 0–125)
PH UR: 8 — SIGNIFICANT CHANGE UP (ref 5–8)
PLATELET # BLD AUTO: 254 K/UL — SIGNIFICANT CHANGE UP (ref 150–400)
POTASSIUM SERPL-MCNC: 3.8 MMOL/L — SIGNIFICANT CHANGE UP (ref 3.5–5.3)
POTASSIUM SERPL-SCNC: 3.8 MMOL/L — SIGNIFICANT CHANGE UP (ref 3.5–5.3)
PROT SERPL-MCNC: 8 G/DL — SIGNIFICANT CHANGE UP (ref 6–8.3)
PROT UR-MCNC: NEGATIVE — SIGNIFICANT CHANGE UP
PROTHROM AB SERPL-ACNC: 11.5 SEC — SIGNIFICANT CHANGE UP (ref 9.8–12.7)
RBC # BLD: 4.85 M/UL — SIGNIFICANT CHANGE UP (ref 4.2–5.8)
RBC # FLD: 12.8 % — SIGNIFICANT CHANGE UP (ref 10.3–14.5)
SODIUM SERPL-SCNC: 144 MMOL/L — SIGNIFICANT CHANGE UP (ref 135–145)
SP GR SPEC: 1.01 — SIGNIFICANT CHANGE UP (ref 1.01–1.02)
UROBILINOGEN FLD QL: NEGATIVE — SIGNIFICANT CHANGE UP
WBC # BLD: 10.4 K/UL — SIGNIFICANT CHANGE UP (ref 3.8–10.5)
WBC # FLD AUTO: 10.4 K/UL — SIGNIFICANT CHANGE UP (ref 3.8–10.5)

## 2018-08-21 PROCEDURE — 99285 EMERGENCY DEPT VISIT HI MDM: CPT

## 2018-08-21 PROCEDURE — 71045 X-RAY EXAM CHEST 1 VIEW: CPT | Mod: 26

## 2018-08-21 RX ORDER — PIPERACILLIN AND TAZOBACTAM 4; .5 G/20ML; G/20ML
3.38 INJECTION, POWDER, LYOPHILIZED, FOR SOLUTION INTRAVENOUS ONCE
Qty: 0 | Refills: 0 | Status: COMPLETED | OUTPATIENT
Start: 2018-08-21 | End: 2018-08-21

## 2018-08-21 RX ORDER — SODIUM CHLORIDE 9 MG/ML
1000 INJECTION INTRAMUSCULAR; INTRAVENOUS; SUBCUTANEOUS
Qty: 0 | Refills: 0 | Status: DISCONTINUED | OUTPATIENT
Start: 2018-08-21 | End: 2018-08-22

## 2018-08-21 RX ADMIN — PIPERACILLIN AND TAZOBACTAM 200 GRAM(S): 4; .5 INJECTION, POWDER, LYOPHILIZED, FOR SOLUTION INTRAVENOUS at 20:00

## 2018-08-21 RX ADMIN — SODIUM CHLORIDE 125 MILLILITER(S): 9 INJECTION INTRAMUSCULAR; INTRAVENOUS; SUBCUTANEOUS at 18:59

## 2018-08-21 NOTE — ED PROVIDER NOTE - MUSCULOSKELETAL, MLM
Spine appears normal, range of motion is not limited, no muscle or joint tenderness, gaby legs- atrophy Spine appears normal, range of motion is not limited, no muscle or joint tenderness, gaby legs- atrophy, generalized stiffness

## 2018-08-21 NOTE — ED PROVIDER NOTE - OBJECTIVE STATEMENT
66 y/o M w/ PMHx of Parkinson's Disease, who is wheelchair bound, BIB EMS. Per HHA, who has been with him for 8 months, pt was sent in at advisement of speech therapist who he has been seeing every Monday x 3 weeks. Today, speech therapist noted pt to be coughing w/ SOB and increased secretions in mouth which is why he was advised to come to ED. Pt w/ chronic cough w/ SOB at times, per HHA. Currently w/ good appetite and no fever.

## 2018-08-21 NOTE — ED PROVIDER NOTE - PROGRESS NOTE DETAILS
pt with coughing, concern for Asp. PNA, will admit pt for further speech therapy eval., case d/w Dr. Diehl

## 2018-08-21 NOTE — ED ADULT NURSE NOTE - OBJECTIVE STATEMENT
Pt came from home accompanied by HHA , who states PT visiting, noticed pt drooling ,increased secretions, difficulty swallowing  HHA who is with pt x 8 mos, stated did not notice any change

## 2018-08-21 NOTE — ED ADULT NURSE NOTE - NSIMPLEMENTINTERV_GEN_ALL_ED
Implemented All Universal Safety Interventions:  [place] to call system. Call bell, personal items and telephone within reach. Instruct patient to call for assistance. Room bathroom lighting operational. Non-slip footwear when patient is off stretcher. Physically safe environment: no spills, clutter or unnecessary equipment. Stretcher in lowest position, wheels locked, appropriate side rails in place.

## 2018-08-22 DIAGNOSIS — R13.10 DYSPHAGIA, UNSPECIFIED: ICD-10-CM

## 2018-08-22 DIAGNOSIS — G20 PARKINSON'S DISEASE: ICD-10-CM

## 2018-08-22 DIAGNOSIS — Z29.9 ENCOUNTER FOR PROPHYLACTIC MEASURES, UNSPECIFIED: ICD-10-CM

## 2018-08-22 DIAGNOSIS — R06.02 SHORTNESS OF BREATH: ICD-10-CM

## 2018-08-22 DIAGNOSIS — E78.5 HYPERLIPIDEMIA, UNSPECIFIED: ICD-10-CM

## 2018-08-22 DIAGNOSIS — I10 ESSENTIAL (PRIMARY) HYPERTENSION: ICD-10-CM

## 2018-08-22 DIAGNOSIS — Z71.89 OTHER SPECIFIED COUNSELING: ICD-10-CM

## 2018-08-22 DIAGNOSIS — I63.9 CEREBRAL INFARCTION, UNSPECIFIED: ICD-10-CM

## 2018-08-22 LAB
ALBUMIN SERPL ELPH-MCNC: 3.5 G/DL — SIGNIFICANT CHANGE UP (ref 3.5–5)
ALP SERPL-CCNC: 99 U/L — SIGNIFICANT CHANGE UP (ref 40–120)
ALT FLD-CCNC: 21 U/L DA — SIGNIFICANT CHANGE UP (ref 10–60)
ANION GAP SERPL CALC-SCNC: 9 MMOL/L — SIGNIFICANT CHANGE UP (ref 5–17)
AST SERPL-CCNC: 12 U/L — SIGNIFICANT CHANGE UP (ref 10–40)
BILIRUB SERPL-MCNC: 0.7 MG/DL — SIGNIFICANT CHANGE UP (ref 0.2–1.2)
BUN SERPL-MCNC: 10 MG/DL — SIGNIFICANT CHANGE UP (ref 7–18)
CALCIUM SERPL-MCNC: 8.5 MG/DL — SIGNIFICANT CHANGE UP (ref 8.4–10.5)
CHLORIDE SERPL-SCNC: 107 MMOL/L — SIGNIFICANT CHANGE UP (ref 96–108)
CO2 SERPL-SCNC: 27 MMOL/L — SIGNIFICANT CHANGE UP (ref 22–31)
CREAT SERPL-MCNC: 0.77 MG/DL — SIGNIFICANT CHANGE UP (ref 0.5–1.3)
GLUCOSE SERPL-MCNC: 105 MG/DL — HIGH (ref 70–99)
HCT VFR BLD CALC: 39.5 % — SIGNIFICANT CHANGE UP (ref 39–50)
HGB BLD-MCNC: 12.9 G/DL — LOW (ref 13–17)
INR BLD: 1.15 RATIO — SIGNIFICANT CHANGE UP (ref 0.88–1.16)
MCHC RBC-ENTMCNC: 29.7 PG — SIGNIFICANT CHANGE UP (ref 27–34)
MCHC RBC-ENTMCNC: 32.6 GM/DL — SIGNIFICANT CHANGE UP (ref 32–36)
MCV RBC AUTO: 91.3 FL — SIGNIFICANT CHANGE UP (ref 80–100)
PLATELET # BLD AUTO: 228 K/UL — SIGNIFICANT CHANGE UP (ref 150–400)
POTASSIUM SERPL-MCNC: 3.9 MMOL/L — SIGNIFICANT CHANGE UP (ref 3.5–5.3)
POTASSIUM SERPL-SCNC: 3.9 MMOL/L — SIGNIFICANT CHANGE UP (ref 3.5–5.3)
PROT SERPL-MCNC: 6.7 G/DL — SIGNIFICANT CHANGE UP (ref 6–8.3)
PROTHROM AB SERPL-ACNC: 12.6 SEC — SIGNIFICANT CHANGE UP (ref 9.8–12.7)
RAPID RVP RESULT: SIGNIFICANT CHANGE UP
RBC # BLD: 4.33 M/UL — SIGNIFICANT CHANGE UP (ref 4.2–5.8)
RBC # FLD: 12.7 % — SIGNIFICANT CHANGE UP (ref 10.3–14.5)
SODIUM SERPL-SCNC: 143 MMOL/L — SIGNIFICANT CHANGE UP (ref 135–145)
WBC # BLD: 6.6 K/UL — SIGNIFICANT CHANGE UP (ref 3.8–10.5)
WBC # FLD AUTO: 6.6 K/UL — SIGNIFICANT CHANGE UP (ref 3.8–10.5)

## 2018-08-22 PROCEDURE — 71250 CT THORAX DX C-: CPT | Mod: 26

## 2018-08-22 RX ORDER — AMLODIPINE BESYLATE 2.5 MG/1
5 TABLET ORAL DAILY
Qty: 0 | Refills: 0 | Status: DISCONTINUED | OUTPATIENT
Start: 2018-08-22 | End: 2018-08-28

## 2018-08-22 RX ORDER — CEFEPIME 1 G/1
INJECTION, POWDER, FOR SOLUTION INTRAMUSCULAR; INTRAVENOUS
Qty: 0 | Refills: 0 | Status: DISCONTINUED | OUTPATIENT
Start: 2018-08-22 | End: 2018-08-24

## 2018-08-22 RX ORDER — CEFEPIME 1 G/1
500 INJECTION, POWDER, FOR SOLUTION INTRAMUSCULAR; INTRAVENOUS EVERY 12 HOURS
Qty: 0 | Refills: 0 | Status: DISCONTINUED | OUTPATIENT
Start: 2018-08-22 | End: 2018-08-24

## 2018-08-22 RX ORDER — CARBIDOPA AND LEVODOPA 25; 100 MG/1; MG/1
1.5 TABLET ORAL
Qty: 0 | Refills: 0 | Status: DISCONTINUED | OUTPATIENT
Start: 2018-08-22 | End: 2018-08-28

## 2018-08-22 RX ORDER — ASPIRIN/CALCIUM CARB/MAGNESIUM 324 MG
81 TABLET ORAL DAILY
Qty: 0 | Refills: 0 | Status: DISCONTINUED | OUTPATIENT
Start: 2018-08-22 | End: 2018-08-28

## 2018-08-22 RX ORDER — CEFEPIME 1 G/1
500 INJECTION, POWDER, FOR SOLUTION INTRAMUSCULAR; INTRAVENOUS ONCE
Qty: 0 | Refills: 0 | Status: COMPLETED | OUTPATIENT
Start: 2018-08-22 | End: 2018-08-22

## 2018-08-22 RX ORDER — ENOXAPARIN SODIUM 100 MG/ML
40 INJECTION SUBCUTANEOUS
Qty: 0 | Refills: 0 | Status: DISCONTINUED | OUTPATIENT
Start: 2018-08-22 | End: 2018-08-22

## 2018-08-22 RX ORDER — PANTOPRAZOLE SODIUM 20 MG/1
40 TABLET, DELAYED RELEASE ORAL
Qty: 0 | Refills: 0 | Status: DISCONTINUED | OUTPATIENT
Start: 2018-08-22 | End: 2018-08-25

## 2018-08-22 RX ORDER — IPRATROPIUM/ALBUTEROL SULFATE 18-103MCG
3 AEROSOL WITH ADAPTER (GRAM) INHALATION EVERY 8 HOURS
Qty: 0 | Refills: 0 | Status: DISCONTINUED | OUTPATIENT
Start: 2018-08-22 | End: 2018-08-28

## 2018-08-22 RX ORDER — SENNA PLUS 8.6 MG/1
2 TABLET ORAL AT BEDTIME
Qty: 0 | Refills: 0 | Status: DISCONTINUED | OUTPATIENT
Start: 2018-08-22 | End: 2018-08-28

## 2018-08-22 RX ORDER — METRONIDAZOLE 500 MG
TABLET ORAL
Qty: 0 | Refills: 0 | Status: DISCONTINUED | OUTPATIENT
Start: 2018-08-22 | End: 2018-08-22

## 2018-08-22 RX ORDER — VANCOMYCIN HCL 1 G
1000 VIAL (EA) INTRAVENOUS EVERY 12 HOURS
Qty: 0 | Refills: 0 | Status: DISCONTINUED | OUTPATIENT
Start: 2018-08-22 | End: 2018-08-22

## 2018-08-22 RX ORDER — METRONIDAZOLE 500 MG
500 TABLET ORAL EVERY 8 HOURS
Qty: 0 | Refills: 0 | Status: DISCONTINUED | OUTPATIENT
Start: 2018-08-22 | End: 2018-08-24

## 2018-08-22 RX ORDER — MEMANTINE HYDROCHLORIDE 10 MG/1
5 TABLET ORAL AT BEDTIME
Qty: 0 | Refills: 0 | Status: DISCONTINUED | OUTPATIENT
Start: 2018-08-22 | End: 2018-08-28

## 2018-08-22 RX ORDER — DOCUSATE SODIUM 100 MG
100 CAPSULE ORAL
Qty: 0 | Refills: 0 | Status: DISCONTINUED | OUTPATIENT
Start: 2018-08-22 | End: 2018-08-28

## 2018-08-22 RX ORDER — PIPERACILLIN AND TAZOBACTAM 4; .5 G/20ML; G/20ML
3.38 INJECTION, POWDER, LYOPHILIZED, FOR SOLUTION INTRAVENOUS EVERY 8 HOURS
Qty: 0 | Refills: 0 | Status: DISCONTINUED | OUTPATIENT
Start: 2018-08-22 | End: 2018-08-22

## 2018-08-22 RX ORDER — METRONIDAZOLE 500 MG
500 TABLET ORAL EVERY 8 HOURS
Qty: 0 | Refills: 0 | Status: DISCONTINUED | OUTPATIENT
Start: 2018-08-22 | End: 2018-08-22

## 2018-08-22 RX ORDER — METRONIDAZOLE 500 MG
500 TABLET ORAL ONCE
Qty: 0 | Refills: 0 | Status: COMPLETED | OUTPATIENT
Start: 2018-08-22 | End: 2018-08-22

## 2018-08-22 RX ORDER — SODIUM CHLORIDE 9 MG/ML
1000 INJECTION, SOLUTION INTRAVENOUS
Qty: 0 | Refills: 0 | Status: DISCONTINUED | OUTPATIENT
Start: 2018-08-22 | End: 2018-08-28

## 2018-08-22 RX ORDER — CLOPIDOGREL BISULFATE 75 MG/1
75 TABLET, FILM COATED ORAL DAILY
Qty: 0 | Refills: 0 | Status: DISCONTINUED | OUTPATIENT
Start: 2018-08-22 | End: 2018-08-28

## 2018-08-22 RX ORDER — CARBIDOPA AND LEVODOPA 25; 100 MG/1; MG/1
1 TABLET ORAL
Qty: 0 | Refills: 0 | Status: DISCONTINUED | OUTPATIENT
Start: 2018-08-22 | End: 2018-08-28

## 2018-08-22 RX ORDER — ENOXAPARIN SODIUM 100 MG/ML
40 INJECTION SUBCUTANEOUS DAILY
Qty: 0 | Refills: 0 | Status: DISCONTINUED | OUTPATIENT
Start: 2018-08-22 | End: 2018-08-28

## 2018-08-22 RX ADMIN — CARBIDOPA AND LEVODOPA 1.5 TABLET(S): 25; 100 TABLET ORAL at 06:29

## 2018-08-22 RX ADMIN — CEFEPIME 100 MILLIGRAM(S): 1 INJECTION, POWDER, FOR SOLUTION INTRAMUSCULAR; INTRAVENOUS at 09:56

## 2018-08-22 RX ADMIN — Medication 100 MILLIGRAM(S): at 21:38

## 2018-08-22 RX ADMIN — Medication 3 MILLILITER(S): at 05:10

## 2018-08-22 RX ADMIN — CEFEPIME 100 MILLIGRAM(S): 1 INJECTION, POWDER, FOR SOLUTION INTRAMUSCULAR; INTRAVENOUS at 22:39

## 2018-08-22 RX ADMIN — Medication 3 MILLILITER(S): at 15:10

## 2018-08-22 RX ADMIN — Medication 3 MILLILITER(S): at 21:16

## 2018-08-22 RX ADMIN — Medication 100 MILLIGRAM(S): at 18:11

## 2018-08-22 RX ADMIN — AMLODIPINE BESYLATE 5 MILLIGRAM(S): 2.5 TABLET ORAL at 05:09

## 2018-08-22 RX ADMIN — MEMANTINE HYDROCHLORIDE 5 MILLIGRAM(S): 10 TABLET ORAL at 21:38

## 2018-08-22 RX ADMIN — ENOXAPARIN SODIUM 40 MILLIGRAM(S): 100 INJECTION SUBCUTANEOUS at 05:10

## 2018-08-22 RX ADMIN — PIPERACILLIN AND TAZOBACTAM 25 GRAM(S): 4; .5 INJECTION, POWDER, LYOPHILIZED, FOR SOLUTION INTRAVENOUS at 05:09

## 2018-08-22 RX ADMIN — Medication 81 MILLIGRAM(S): at 12:53

## 2018-08-22 RX ADMIN — CARBIDOPA AND LEVODOPA 1.5 TABLET(S): 25; 100 TABLET ORAL at 15:11

## 2018-08-22 RX ADMIN — CARBIDOPA AND LEVODOPA 1 TABLET(S): 25; 100 TABLET ORAL at 12:54

## 2018-08-22 RX ADMIN — CLOPIDOGREL BISULFATE 75 MILLIGRAM(S): 75 TABLET, FILM COATED ORAL at 12:53

## 2018-08-22 RX ADMIN — ENOXAPARIN SODIUM 40 MILLIGRAM(S): 100 INJECTION SUBCUTANEOUS at 12:54

## 2018-08-22 RX ADMIN — SENNA PLUS 2 TABLET(S): 8.6 TABLET ORAL at 21:38

## 2018-08-22 RX ADMIN — Medication 100 MILLIGRAM(S): at 09:56

## 2018-08-22 RX ADMIN — Medication 250 MILLIGRAM(S): at 05:17

## 2018-08-22 NOTE — H&P ADULT - PROBLEM SELECTOR PLAN 2
Confirmed home medications from [facility] care [place]  -Will start pt on home medications   -Levodopa-carbidopa  four times a day  -Memantine 5mg once daily -Pt having recurrent episodes of aspiration pneumonia likely related to swallowing & speech difficulty d/t parkinson's  -Repeat speech & swallow eval  -Will keep pt npo until further recommendation  -Consult for Peg tube feeding   - Paliative consult

## 2018-08-22 NOTE — H&P ADULT - PROBLEM SELECTOR PLAN 8
IMPROVE VTE Individual Risk Assessment    RISK                                                                Points    [  ] Previous VTE                                                  3  [  ] Thrombophilia                                               2  [  ] Lower limb paralysis                                      2        (unable to hold up >15 seconds)    [  ] Current Cancer                                              2         (within 6 months)  [ x ] Immobilization > 24 hrs                                1  [  ] ICU/CCU stay > 24 hours                              1  [ x] Age > 60                                                      1  IMPROVE VTE Score _____2____  - DVT ppx with lovenox 40 x12 hourly  -GI ppx with pantoprazole 40 mg daily IMPROVE VTE Individual Risk Assessment    RISK                                                                Points    [  ] Previous VTE                                                  3  [  ] Thrombophilia                                               2  [  ] Lower limb paralysis                                      2        (unable to hold up >15 seconds)    [  ] Current Cancer                                              2         (within 6 months)  [ x ] Immobilization > 24 hrs                                1  [  ] ICU/CCU stay > 24 hours                              1  [ x] Age > 60                                                      1  IMPROVE VTE Score _____2____  - DVT ppx with lovenox 40 daily  -GI ppx with pantoprazole 40 mg daily

## 2018-08-22 NOTE — SWALLOW BEDSIDE ASSESSMENT ADULT - SLP PERTINENT HISTORY OF CURRENT PROBLEM
68 y/o wheel chair bound M with PMHx of Parkinson's Disease, CVA, HTN , HLD who was brought to ED by his HHA for cough & SOB. CXR indicated worsening of questionable opacity in RLL. Pt was previously admitted in June 2018 for aspiration pneumonia.

## 2018-08-22 NOTE — H&P ADULT - NSHPLABSRESULTS_GEN_ALL_CORE
14.4   10.4  )-----------( 254      ( 21 Aug 2018 18:45 )             44.3   08-21    144  |  108  |  12  ----------------------------<  86  3.8   |  28  |  0.80    Ca    9.0      21 Aug 2018 18:45  Mg     2.2     08-21    TPro  8.0  /  Alb  4.1  /  TBili  0.5  /  DBili  x   /  AST  16  /  ALT  12  /  AlkPhos  121<H>  08-21

## 2018-08-22 NOTE — H&P ADULT - NSHPREVIEWOFSYSTEMS_GEN_ALL_CORE
Unable to evaluate complete ROS due to inability of pt to answer. Pt dose communicated by sign language that he does not have any pain other than pain in eyes.

## 2018-08-22 NOTE — SWALLOW BEDSIDE ASSESSMENT ADULT - SWALLOW EVAL: DIAGNOSIS
Pt p/w s&s of oropharyngeal dysphagia c/b slow labial mov't, slow oral transit & A-P transport, and delayed pharyngeal swallow trigger. No overt s&s of penetration/aspiration observed.

## 2018-08-22 NOTE — H&P ADULT - PROBLEM SELECTOR PLAN 5
Will continue with apsirin & plavix home dose Will monitor blood pressure and continue amlodipine 5mg daily with parameters

## 2018-08-22 NOTE — CONSULT NOTE ADULT - SUBJECTIVE AND OBJECTIVE BOX
CHIEF COMPLAINT: Patient is a 67y old  Male who presents with a chief complaint of SOB & cough (22 Aug 2018 00:04)      HPI:  Pt is a 66 y/o wheel chair bound male with PMH of Parkinson's Disease, CVA, HTN , HLD who was brought to ED by his HHA for cough & SOB. As per ED provider note, HHA reports that pt had worsening sob & cough for one day for which he was brought to ED, there was no asociated chest pain or fever.  Pt was recently admitted ot [facility] on 2018 with similar complaints, Pt was worked up for pmeumonia and was found have aspiration pneumonia for which he was started on zosyn, levaquin, flagyl & maxipime which were later discontinued as he remained afebrile & cultures remained negative. Pt was evaluated by Speech and swallow who initially recommend npo with crushed meds with applesauce and later on with clinical improvement pureed & honey thick liquids by spoons as tolerated. I was unable to talk to home health aid, called the [place] adult care agency and received call back from Mr. Jennings agency representative([phone]) who confirmed medications and to call in morning to speak to pt's nurse and HHA. (22 Aug 2018 00:04)   Patient seen and examined.     PAST MEDICAL & SURGICAL HISTORY:  HLD (hyperlipidemia)  HTN (hypertension)  Parkinson's disease  Blindness of right eye  CVA (cerebral vascular accident)  History of laminectomy      Allergies    No Known Allergies    Intolerances        MEDICATIONS  (STANDING):  ALBUTerol/ipratropium for Nebulization 3 milliLiter(s) Nebulizer every 8 hours  amLODIPine   Tablet 5 milliGRAM(s) Oral daily  aspirin  chewable 81 milliGRAM(s) Oral daily  carbidopa/levodopa  25/100 1 Tablet(s) Oral <User Schedule>  carbidopa/levodopa  25/100 1.5 Tablet(s) Oral <User Schedule>  cefepime   IVPB      cefepime   IVPB 500 milliGRAM(s) IV Intermittent every 12 hours  clopidogrel Tablet 75 milliGRAM(s) Oral daily  dextrose 5% + sodium chloride 0.9%. 1000 milliLiter(s) (70 mL/Hr) IV Continuous <Continuous>  docusate sodium 100 milliGRAM(s) Oral two times a day  enoxaparin Injectable 40 milliGRAM(s) SubCutaneous daily  memantine 5 milliGRAM(s) Oral at bedtime  metroNIDAZOLE  IVPB      metroNIDAZOLE  IVPB 500 milliGRAM(s) IV Intermittent every 8 hours  pantoprazole    Tablet 40 milliGRAM(s) Oral before breakfast  senna 2 Tablet(s) Oral at bedtime      MEDICATIONS  (PRN):   Medications up to date at time of exam.    FAMILY HISTORY:  No pertinent family history in first degree relatives      SOCIAL HISTORY: unable to obtain  Smoking History: [   ] smoking/smoke exposure, [   ] former smoker, [  ] denies smoking  Living Condition: [   ] apartment, [   ] private house  Work History:   Travel History: denies recent travel  Illicit Substance Use: denies  Alcohol Use: denies    REVIEW OF SYSTEMS: unable to obtain    CONSTITUTIONAL:  denies fevers, chills, sweats, weight loss    HEENT:  denies diplopia or blurred vision, sore throat or runny nose.    CARDIOVASCULAR:  denies pressure, squeezing, tightness, or heaviness about the chest; no palpitations.    RESPIRATORY:  denies SOB, cough, [place], wheezing.    GASTROINTESTINAL:  denies abdominal pain, nausea, vomiting or diarrhea.    GENITOURINARY: denies dysuria, frequency or urgency.    NEUROLOGIC:  denies numbness, tingling, seizures or weakness.    PSYCHIATRIC:  denies disorder of thought or mood.    MSK: denies swelling, redness      PHYSICAL EXAMINATION:    GENERAL: The patient is a well-developed, well-nourished, in no apparent distress.     Vital Signs Last 24 Hrs  T(C): 36.7 (22 Aug 2018 15:54), Max: 37.4 (21 Aug 2018 19:37)  T(F): 98 (22 Aug 2018 15:54), Max: 99.4 (21 Aug 2018 19:37)  HR: 92 (22 Aug 2018 15:54) (73 - 93)  BP: 129/77 (22 Aug 2018 15:54) (117/48 - 149/81)  BP(mean): --  RR: 18 (22 Aug 2018 15:54) (16 - 18)  SpO2: 99% (22 Aug 2018 15:54) (97% - 99%)   (if applicable)    Chest Tube (if applicable)    HEENT: Head is normocephalic and atraumatic. .    NECK: Supple, no palpable adenopathy.    LUNGS: Clear to auscultation, no wheezing, rales, or rhonchi.    HEART: Regular rate and rhythm without murmur.    ABDOMEN: Soft, nontender, and nondistended.  No hepatosplenomegaly is noted.    EXTREMITIES: Without any cyanosis, clubbing, rash, lesions or edema.    NEUROLOGIC: Awake, alert.    SKIN: Warm, dry, good turgor.      LABS:                        12.9   6.6   )-----------( 228      ( 22 Aug 2018 06:30 )             39.5         143  |  107  |  10  ----------------------------<  105<H>  3.9   |  27  |  0.77    Ca    8.5      22 Aug 2018 06:30  Mg     2.2         TPro  6.7  /  Alb  3.5  /  TBili  0.7  /  DBili  x   /  AST  12  /  ALT  21  /  AlkPhos  99      PT/INR - ( 22 Aug 2018 06:30 )   PT: 12.6 sec;   INR: 1.15 ratio         PTT - ( 21 Aug 2018 18:45 )  PTT:32.3 sec  Urinalysis Basic - ( 21 Aug 2018 23:14 )    Color: Yellow / Appearance: Clear / S.010 / pH: x  Gluc: x / Ketone: Negative  / Bili: Negative / Urobili: Negative   Blood: x / Protein: Negative / Nitrite: Negative   Leuk Esterase: Negative / RBC: x / WBC x   Sq Epi: x / Non Sq Epi: x / Bacteria: x              Serum Pro-Brain Natriuretic Peptide: 76 pg/mL (18 @ 18:45)    Lactate, Blood: 1.2 mmol/L (18 @ 18:45)        MICROBIOLOGY: (if applicable)    RADIOLOGY & ADDITIONAL STUDIES:  EKG:   CXR:  < from: CT Chest No Cont (18 @ 09:40) >    EXAM:  CT CHEST                            PROCEDURE DATE:  2018          INTERPRETATION:  CLINICAL INFORMATION: Cough; shortness of breath;   clinical concern for pneumonia    COMPARISON: CT scan of the abdomen and pelvis acquired 2018, chest   x-ray acquired 2018.    PROCEDURE:   CT of the Chest was performed without intravenous contrast.  Sagittal and coronal reformats were performed.    Maximum intensity projection images were generated.       FINDINGS:    LUNGS AND LARGE AIRWAYS: Mild amount of secretions within the trachea.   Patent central airways.  Mild diffuse bronchial wall thickening.    Mild upper lung predominant centrilobular emphysema.    Mild basilar predominant peripheral reticulations, concerning for   asbestosis. Patchy groundglass opacities predominantly within the   dependent portion of right lower lobe may represent atypical infection,   pneumonitis or manifestation of interstitial lung disease secondary to   asbestosis.    PLEURA: Extensive bilateral calcified pleural plaques, left greater than   the right and within the diaphragmatic surface of the left lower lung.    Small left pleural effusion, unchanged.    VESSELS: The descending aorta is mildly dilated measuring 4.2 cm. The   descending aorta and aortic arch are normal in diameter.    The main pulmonary arteries normal in diameter.    Multivessel coronary atherosclerosis. Mild aortic atherosclerosis.    HEART: Heart size is normal. No pericardial effusion. Atherosclerotic   changes of the LAD, and circumflex arteries.     MEDIASTINUM AND MARLENY: No lymphadenopathy.    CHEST WALL AND LOWER NECK: Approximately 1 cm right thyroid nodule.    VISUALIZED UPPER ABDOMEN: cholelithiasis.    BONES: Old fracture deformity of the left seventh rib. Mild fracture   deformity of T10 and L1 vertebral bodies.    IMPRESSION:     *  Extensive bilateral calcified pleural plaques, left greater than the   right likely secondary to prior asbestosis exposure.    *  Mild basilar predominant peripheral reticulations, concerning for   asbestosis. Recommend follow-up chest CT with prone and supine technique   for further evaluation.    *  Patchy groundglass opacities predominantly within the dependent   portion of right lower lobe may represent atypicalinfection, pneumonitis   or manifestation of interstitial lung disease secondary to asbestosis.   Recommend follow-up in 3-4 weeks with supine and prone CT imaging.    *  Coronary atherosclerosis.    *  Approximately 1 cm right thyroid nodule. Recommend further evaluation   with ultrasound.    *  Mild fracture deformity of T10 and L1 vertebral bodies.            TANO EPPERSON M.D., RADIOLOGY RESIDENT  This document has been electronically signed.  EVANGELINA GUARDADO M.D., ATTENDING RADIOLOGIST  This document has been electronically signed. Aug 22 2018  3:08PM                < end of copied text >    ECHO:    IMPRESSION: 67y Male PAST MEDICAL & SURGICAL HISTORY:  HLD (hyperlipidemia)  HTN (hypertension)  Parkinson's disease  Blindness of right eye  CVA (cerebral vascular accident)  History of laminectomy       p/w     Pt is a 66 y/o wheel chair bound male with PMH of Parkinson's Disease, CVA, HTN , HLD who was brought to ED by his HHA for cough & SOB. As per ED provider note, HHA reports that pt had worsening sob & cough for one day for which he was brought to ED, there was no asociated chest pain or fever.  Pt was recently admitted ot [facility] on 2018 with similar complaints, Pt was worked up for pmeumonia and was found have aspiration pneumonia for which he was started on zosyn, levaquin, flagyl & maxipime which were later discontinued as he remained afebrile & cultures remained negative. Pt was evaluated by Speech and swallow who initially recommend npo with crushed meds with applesauce and later on with clinical improvement pureed & honey thick liquids by spoons as tolerated. I was unable to talk to home health aid, called the [place] adult care agency and received call back from Mr. Jennings agency representative([phone]) who confirmed medications and to call in morning to speak to pt's nurse and HHA. (22 Aug 2018 00:04)    --    Patient presents from home with SOB as per AWILDA Gann.     +SOB due to acute bronchitis? vs aspiration PNA,, however CT results may be due to previously tx aspiration PNA, no leukocytosis, no fevers    RVP negative  +atelectasis    SUGGESTION:   - con't with bronchodilators, o2 supplement as needed.    - f/u bcx, procalcitonin   - aspiration precautions   - swallow eval   - DVT and GI prophylaxis.    - Plan of care discussed with AWILDA Gann at bedside

## 2018-08-22 NOTE — H&P ADULT - PROBLEM SELECTOR PLAN 6
IMPROVE VTE Individual Risk Assessment    RISK                                                                Points    [  ] Previous VTE                                                  3  [  ] Thrombophilia                                               2  [  ] Lower limb paralysis                                      2        (unable to hold up >15 seconds)    [  ] Current Cancer                                              2         (within 6 months)  [ x ] Immobilization > 24 hrs                                1  [  ] ICU/CCU stay > 24 hours                              1  [ x] Age > 60                                                      1  IMPROVE VTE Score _____2____  - DVT ppx with  -GI ppx with pantoprazole 40 mg daily Pt has h/o hyperlipidemia not on any medications   -F/u lipid profile in am  -Last lipid profile done in June was leatha

## 2018-08-22 NOTE — SWALLOW BEDSIDE ASSESSMENT ADULT - SWALLOW EVAL: RECOMMENDED FEEDING/EATING TECHNIQUES
alternate food with liquid/check mouth frequently for oral residue/pocketing/hard swallow w/ each bite or sip/position upright (90 degrees)/allow for swallow between intakes/oral hygiene/small sips/bites/crush medication (when feasible)/maintain upright posture during/after eating for 30 mins

## 2018-08-22 NOTE — H&P ADULT - NSHPSOURCEINFOTX_GEN_ALL_CORE
Tried to call [facility] for HHA at [phone] twice , but they didn't call back

## 2018-08-22 NOTE — H&P ADULT - HISTORY OF PRESENT ILLNESS
Pt is a 66 y/o wheel chair bound male with PMH of Parkinson's Disease, CVA, HTN , HLD who was brought to ED by his HHA for cough & SOB. As per ED provider note, HHA reports that pt had worsening sob & cough for one day for which he was brought to ED, there was no asociated chest pain or fever.  Pt was recently admitted ot [facility] on June 30, 2018 with similar complaints, Pt was worked up for pmeumonia and was found have aspiration pneumonia for which he was started on zosyn, levaquin, flagyl & maxipime which were later discontinued as he remained afebrile & cultures remained negative Pt is a 66 y/o wheel chair bound male with PMH of Parkinson's Disease, CVA, HTN , HLD who was brought to ED by his HHA for cough & SOB. As per ED provider note, HHA reports that pt had worsening sob & cough for one day for which he was brought to ED, there was no asociated chest pain or fever.  Pt was recently admitted ot [facility] on June 30, 2018 with similar complaints, Pt was worked up for pmeumonia and was found have aspiration pneumonia for which he was started on zosyn, levaquin, flagyl & maxipime which were later discontinued as he remained afebrile & cultures remained negative. Pt was evaluated by Speech and swallow who initially recommend npo with crushed meds with applesauce and later on clinical improvement pureed & honey thick liquids by spoons as tolerated. I was unable to talk to home health aid, called the [place] adult care agency, was informed that Anthony have information about pt and they will give me call back but haven't heard back from them yet Pt is a 68 y/o wheel chair bound male with PMH of Parkinson's Disease, CVA, HTN , HLD who was brought to ED by his HHA for cough & SOB. As per ED provider note, HHA reports that pt had worsening sob & cough for one day for which he was brought to ED, there was no asociated chest pain or fever.  Pt was recently admitted ot [facility] on June 30, 2018 with similar complaints, Pt was worked up for pmeumonia and was found have aspiration pneumonia for which he was started on zosyn, levaquin, flagyl & maxipime which were later discontinued as he remained afebrile & cultures remained negative. Pt was evaluated by Speech and swallow who initially recommend npo with crushed meds with applesauce and later on clinical improvement pureed & honey thick liquids by spoons as tolerated. I was unable to talk to home health aid, called the [place] adult care agency and recieved call back from Mr. Jennings agency representative([phone]) who confirmed medications and to call in morning to speak to pt's nurse and HHA. Pt is a 68 y/o wheel chair bound male with PMH of Parkinson's Disease, CVA, HTN , HLD who was brought to ED by his HHA for cough & SOB. As per ED provider note, HHA reports that pt had worsening sob & cough for one day for which he was brought to ED, there was no asociated chest pain or fever.  Pt was recently admitted ot [facility] on June 30, 2018 with similar complaints, Pt was worked up for pmeumonia and was found have aspiration pneumonia for which he was started on zosyn, levaquin, flagyl & maxipime which were later discontinued as he remained afebrile & cultures remained negative. Pt was evaluated by Speech and swallow who initially recommend npo with crushed meds with applesauce and later on with clinical improvement pureed & honey thick liquids by spoons as tolerated. I was unable to talk to home health aid, called the [place] adult care agency and received call back from Mr. Jennings agency representative([phone]) who confirmed medications and to call in morning to speak to pt's nurse and HHA.

## 2018-08-22 NOTE — SWALLOW BEDSIDE ASSESSMENT ADULT - ASR SWALLOW ASPIRATION MONITOR
cough/pneumonia/throat clearing/upper respiratory infection/fever/oral hygiene/change of breathing pattern/position upright (90Y)/gurgly voice

## 2018-08-22 NOTE — SWALLOW BEDSIDE ASSESSMENT ADULT - COMMENTS
Pt seen for bedside swallow evaluation. Pt known from prior hospital admission in June 2018, previously recommended puree & honey-thick liquids by spoon. HOB elevated to 90 degrees. HHA present at bedside. A,A+nonverbal, able to follow directives & respond to simple Y/N queries via gestures (thumbs up/down). Pt p/w mild oral defensiveness 2/2 taste aversion (e.g., did not accept applesauce or honey-thick water).

## 2018-08-22 NOTE — H&P ADULT - PROBLEM SELECTOR PLAN 3
Will monitor blood pressure and continue amlodipine 5mg daily with parameters Unable to reach pt family & to discuss goals of care  -Social work consult

## 2018-08-22 NOTE — H&P ADULT - ASSESSMENT
14.4   10.4  )-----------( 254      ( 21 Aug 2018 18:45 )             44.3   08-21    144  |  108  |  12  ----------------------------<  86  3.8   |  28  |  0.80    Ca    9.0      21 Aug 2018 18:45  Mg     2.2     08-21    TPro  8.0  /  Alb  4.1  /  TBili  0.5  /  DBili  x   /  AST  16  /  ALT  12  /  AlkPhos  121<H>  08-21 Pt is a 67 year old male with PMH of Parkinson's disease, htn, hld & cva who is currently admitted to medicine for management of SOB & cough Pt is a 67 year old male with PMH of Parkinson's disease, htn, hld & cva who is currently admitted to medicine for management of SOB & cough         -Primary team [facility][phone]) fro more information & talk to nurse taking care of pt

## 2018-08-22 NOTE — H&P ADULT - PROBLEM SELECTOR PLAN 4
Pt has h/o hyperlipidemia not on any medications   -F/u lipid profile in am  -Last lipid profile done in June was leatha Confirmed home medications from [facility]  -Will start pt on home medications   -Levodopa-carbidopa  four times a day  -Memantine 5mg once daily

## 2018-08-23 LAB
ANION GAP SERPL CALC-SCNC: 10 MMOL/L — SIGNIFICANT CHANGE UP (ref 5–17)
BUN SERPL-MCNC: 12 MG/DL — SIGNIFICANT CHANGE UP (ref 7–18)
CALCIUM SERPL-MCNC: 8.5 MG/DL — SIGNIFICANT CHANGE UP (ref 8.4–10.5)
CHLORIDE SERPL-SCNC: 108 MMOL/L — SIGNIFICANT CHANGE UP (ref 96–108)
CHOLEST SERPL-MCNC: 102 MG/DL — SIGNIFICANT CHANGE UP (ref 10–199)
CO2 SERPL-SCNC: 26 MMOL/L — SIGNIFICANT CHANGE UP (ref 22–31)
CREAT SERPL-MCNC: 0.79 MG/DL — SIGNIFICANT CHANGE UP (ref 0.5–1.3)
CULTURE RESULTS: SIGNIFICANT CHANGE UP
GLUCOSE SERPL-MCNC: 95 MG/DL — SIGNIFICANT CHANGE UP (ref 70–99)
HCT VFR BLD CALC: 38.7 % — LOW (ref 39–50)
HDLC SERPL-MCNC: 53 MG/DL — SIGNIFICANT CHANGE UP
HGB BLD-MCNC: 12.8 G/DL — LOW (ref 13–17)
LIPID PNL WITH DIRECT LDL SERPL: 39 MG/DL — SIGNIFICANT CHANGE UP
MAGNESIUM SERPL-MCNC: 2.1 MG/DL — SIGNIFICANT CHANGE UP (ref 1.6–2.6)
MCHC RBC-ENTMCNC: 29.8 PG — SIGNIFICANT CHANGE UP (ref 27–34)
MCHC RBC-ENTMCNC: 33 GM/DL — SIGNIFICANT CHANGE UP (ref 32–36)
MCV RBC AUTO: 90.2 FL — SIGNIFICANT CHANGE UP (ref 80–100)
PHOSPHATE SERPL-MCNC: 3.6 MG/DL — SIGNIFICANT CHANGE UP (ref 2.5–4.5)
PLATELET # BLD AUTO: 236 K/UL — SIGNIFICANT CHANGE UP (ref 150–400)
POTASSIUM SERPL-MCNC: 3.7 MMOL/L — SIGNIFICANT CHANGE UP (ref 3.5–5.3)
POTASSIUM SERPL-SCNC: 3.7 MMOL/L — SIGNIFICANT CHANGE UP (ref 3.5–5.3)
PROCALCITONIN SERPL-MCNC: 0.04 NG/ML — SIGNIFICANT CHANGE UP (ref 0.02–0.1)
RBC # BLD: 4.29 M/UL — SIGNIFICANT CHANGE UP (ref 4.2–5.8)
RBC # FLD: 12.8 % — SIGNIFICANT CHANGE UP (ref 10.3–14.5)
SODIUM SERPL-SCNC: 144 MMOL/L — SIGNIFICANT CHANGE UP (ref 135–145)
SPECIMEN SOURCE: SIGNIFICANT CHANGE UP
TOTAL CHOLESTEROL/HDL RATIO MEASUREMENT: 1.9 RATIO — LOW (ref 3.4–9.6)
TRIGL SERPL-MCNC: 49 MG/DL — SIGNIFICANT CHANGE UP (ref 10–149)
WBC # BLD: 6.8 K/UL — SIGNIFICANT CHANGE UP (ref 3.8–10.5)
WBC # FLD AUTO: 6.8 K/UL — SIGNIFICANT CHANGE UP (ref 3.8–10.5)

## 2018-08-23 PROCEDURE — 76536 US EXAM OF HEAD AND NECK: CPT | Mod: 26

## 2018-08-23 RX ADMIN — Medication 3 MILLILITER(S): at 21:01

## 2018-08-23 RX ADMIN — Medication 100 MILLIGRAM(S): at 22:33

## 2018-08-23 RX ADMIN — CARBIDOPA AND LEVODOPA 1.5 TABLET(S): 25; 100 TABLET ORAL at 17:14

## 2018-08-23 RX ADMIN — Medication 100 MILLIGRAM(S): at 17:12

## 2018-08-23 RX ADMIN — Medication 100 MILLIGRAM(S): at 05:25

## 2018-08-23 RX ADMIN — CLOPIDOGREL BISULFATE 75 MILLIGRAM(S): 75 TABLET, FILM COATED ORAL at 11:59

## 2018-08-23 RX ADMIN — MEMANTINE HYDROCHLORIDE 5 MILLIGRAM(S): 10 TABLET ORAL at 22:33

## 2018-08-23 RX ADMIN — CEFEPIME 100 MILLIGRAM(S): 1 INJECTION, POWDER, FOR SOLUTION INTRAMUSCULAR; INTRAVENOUS at 05:26

## 2018-08-23 RX ADMIN — CEFEPIME 100 MILLIGRAM(S): 1 INJECTION, POWDER, FOR SOLUTION INTRAMUSCULAR; INTRAVENOUS at 18:07

## 2018-08-23 RX ADMIN — CARBIDOPA AND LEVODOPA 1 TABLET(S): 25; 100 TABLET ORAL at 18:36

## 2018-08-23 RX ADMIN — Medication 100 MILLIGRAM(S): at 13:58

## 2018-08-23 RX ADMIN — PANTOPRAZOLE SODIUM 40 MILLIGRAM(S): 20 TABLET, DELAYED RELEASE ORAL at 05:25

## 2018-08-23 RX ADMIN — CARBIDOPA AND LEVODOPA 1 TABLET(S): 25; 100 TABLET ORAL at 13:52

## 2018-08-23 RX ADMIN — Medication 81 MILLIGRAM(S): at 13:53

## 2018-08-23 RX ADMIN — AMLODIPINE BESYLATE 5 MILLIGRAM(S): 2.5 TABLET ORAL at 05:25

## 2018-08-23 RX ADMIN — SENNA PLUS 2 TABLET(S): 8.6 TABLET ORAL at 22:33

## 2018-08-23 RX ADMIN — Medication 3 MILLILITER(S): at 06:00

## 2018-08-23 RX ADMIN — CARBIDOPA AND LEVODOPA 1.5 TABLET(S): 25; 100 TABLET ORAL at 06:13

## 2018-08-23 RX ADMIN — Medication 3 MILLILITER(S): at 15:24

## 2018-08-23 RX ADMIN — ENOXAPARIN SODIUM 40 MILLIGRAM(S): 100 INJECTION SUBCUTANEOUS at 12:00

## 2018-08-23 NOTE — PROGRESS NOTE ADULT - SUBJECTIVE AND OBJECTIVE BOX
BIJAN KHAN  MR# [id number]  67yMale        Patient is a 67y old  Male who presents with a chief complaint of SOB & cough (22 Aug 2018 00:04)      INTERVAL HPI/OVERNIGHT EVENTS:  Patient seen and examined at bedside. No notations of chest pain, palpitation, SOB, orthopnea, nausea, vomiting or abdominal pain.    ALLERGIES  No Known Allergies      MEDICATIONS  ALBUTerol/ipratropium for Nebulization 3 milliLiter(s) Nebulizer every 8 hours  amLODIPine   Tablet 5 milliGRAM(s) Oral daily  aspirin  chewable 81 milliGRAM(s) Oral daily  carbidopa/levodopa  25/100 1 Tablet(s) Oral <User Schedule>  carbidopa/levodopa  25/100 1.5 Tablet(s) Oral <User Schedule>  cefepime   IVPB      cefepime   IVPB 500 milliGRAM(s) IV Intermittent every 12 hours  clopidogrel Tablet 75 milliGRAM(s) Oral daily  dextrose 5% + sodium chloride 0.9%. 1000 milliLiter(s) IV Continuous <Continuous>  docusate sodium 100 milliGRAM(s) Oral two times a day  enoxaparin Injectable 40 milliGRAM(s) SubCutaneous daily  memantine 5 milliGRAM(s) Oral at bedtime  metroNIDAZOLE  IVPB 500 milliGRAM(s) IV Intermittent every 8 hours  pantoprazole    Tablet 40 milliGRAM(s) Oral before breakfast  senna 2 Tablet(s) Oral at bedtime              REVIEW OF SYSTEMS:  CONSTITUTIONAL: No fever, weight loss, or fatigue  EYES: No eye pain, visual disturbances, or discharge  ENT:  No difficulty hearing, tinnitus, vertigo; No sinus or throat pain  NECK: No pain or stiffness  RESPIRATORY: No cough, wheezing, chills or hemoptysis; No Shortness of Breath  CARDIOVASCULAR: No chest pain, palpitations, passing out, dizziness, or leg swelling  GASTROINTESTINAL: No abdominal or epigastric pain. No nausea, vomiting, or hematemesis; No diarrhea or constipation. No melena or hematochezia.  GENITOURINARY: No dysuria, frequency, hematuria, or incontinence  NEUROLOGICAL: No headaches, memory loss, loss of strength, numbness, or tremors  SKIN: No itching, burning, rashes, or lesions   LYMPH Nodes: No enlarged glands  ENDOCRINE: No heat or cold intolerance; No hair loss  MUSCULOSKELETAL: No joint pain or swelling; No muscle, back, or extremity pain  PSYCHIATRIC: No depression, anxiety, mood swings, or difficulty sleeping  HEME/LYMPH: No easy bruising, or bleeding gums  ALLERGY AND IMMUNOLOGIC: No hives or eczema	    [ ] All others negative	  [ ] Unable to obtain      T(C): 36.6 (18 @ 14:36), Max: 36.6 (18 @ 20:13)  T(F): 97.8 (18 @ 14:36), Max: 97.9 (18 @ 04:45)  HR: 77 (18 @ 14:36) (60 - 78)  BP: 117/67 (18 @ 14:36) (117/67 - 144/67)  RR: 16 (18 @ 14:36) (16 - 18)  SpO2: 98% (18 @ 14:36) (98% - 99%)  Wt(kg): --    I&O's Summary        PHYSICAL EXAM:  A X O x  HEAD:  Atraumatic, Normocephalic  EYES: EOMI, PERRLA, conjunctiva and sclera clear  NECK: Supple, No JVD, Normal thyroid  Resp: CTAB, No crackles, wheezing,   CVS: Regular rate and rhythm; No discernable murmurs, rubs, or gallops  ABD: Soft, Nontender, Nondistended; Bowel sounds present  EXTREMITIES:  2+ Peripheral Pulses, No edema  LYMPH: No dicernable lymphadenopathy noted  GENERAL: NAD, well-groomed, well-developed      LABS:                        12.8   6.8   )-----------( 236      ( 23 Aug 2018 07:23 )             38.7         144  |  108  |  12  ----------------------------<  95  3.7   |  26  |  0.79    Ca    8.5      23 Aug 2018 07:23  Phos  3.6       Mg     2.1         TPro  6.7  /  Alb  3.5  /  TBili  0.7  /  DBili  x   /  AST  12  /  ALT  21  /  AlkPhos  99  08-    PT/INR - ( 22 Aug 2018 06:30 )   PT: 12.6 sec;   INR: 1.15 ratio         PTT - ( 21 Aug 2018 18:45 )  PTT:32.3 sec  Urinalysis Basic - ( 21 Aug 2018 23:14 )    Color: Yellow / Appearance: Clear / S.010 / pH: x  Gluc: x / Ketone: Negative  / Bili: Negative / Urobili: Negative   Blood: x / Protein: Negative / Nitrite: Negative   Leuk Esterase: Negative / RBC: x / WBC x   Sq Epi: x / Non Sq Epi: x / Bacteria: x      CAPILLARY BLOOD GLUCOSE          Troponins:  ProBNP:  Lipid Profile:   HgA1c:  TSH:           RADIOLOGY & ADDITIONAL TESTS:    Imaging Personally Reviewed:  [ ] YES  [ ] NO      Consultant(s) Notes Reviewed:  [x ] YES  [ ] NO    Care Discussed with Consultants/Other Providers [ x] YES  [ ] NO          PAST MEDICAL & SURGICAL HISTORY:  HLD (hyperlipidemia)  HTN (hypertension)  Parkinson's disease  Blindness of right eye  CVA (cerebral vascular accident)  History of laminectomy        Pneumonitis due to inhalation of food or vomitus  No pertinent family history in first degree relatives  Handoff  MEWS Score  HLD (hyperlipidemia)  HTN (hypertension)  Parkinson's disease  Blindness of right eye  CVA (cerebral vascular accident)  HLD (hyperlipidemia)  HTN (hypertension)  Parkinson's disease  Aspiration pneumonia  Goals of care, counseling/discussion  Swallowing difficulty  Prophylactic measure  CVA (cerebral vascular accident)  HLD (hyperlipidemia)  HTN (hypertension)  Parkinson's disease  SOB (shortness of breath)  History of laminectomy  A-DIFFICULTY SWOLLING  22  Dysphasia

## 2018-08-23 NOTE — CONSULT NOTE ADULT - ASSESSMENT
Pt is a 66 y/o wheel chair bound male with PMH of Parkinson's Disease, CVA, HTN , HLD who was brought to ED by his HHA for cough & SOB. As per ED provider note, HHA reports that pt had worsening sob & cough for one day for which he was brought to ED, there was no asociated chest pain or fever.  Pt was recently admitted ot [facility] on June 30, 2018 with similar complaints, Pt was worked up for pmeumonia and was found have aspiration pneumonia for which he was started on zosyn, levaquin, flagyl & maxipime which were later discontinued as he remained afebrile & cultures remained negative. Pt was evaluated by Speech and swallow who initially recommend npo with crushed meds with applesauce and later on with clinical improvement pureed & honey thick liquids by spoons as tolerated. I was unable to talk to home health aid, called the [place] adult care agency and received call back from Mr. Jennings agency representative([phone]) who confirmed medications and to call in morning to speak to pt's nurse and HHA. (22 Aug 2018 00:04). afebrile on adm with stable bp, cxr neg , ct chest with RLL pneumonitis    # RLL atelectasis/pneumonitis likely asp pneumonitis doubt pna . all cx neg     plan  serum procal , if low will d/c all ab   cont cefipime and flagyl for now   f/u blood cx     thnx will f/u   d/w resident

## 2018-08-23 NOTE — PROGRESS NOTE ADULT - SUBJECTIVE AND OBJECTIVE BOX
Time of Visit:  Patient seen and examined.     MEDICATIONS  (STANDING):  ALBUTerol/ipratropium for Nebulization 3 milliLiter(s) Nebulizer every 8 hours  amLODIPine   Tablet 5 milliGRAM(s) Oral daily  aspirin  chewable 81 milliGRAM(s) Oral daily  carbidopa/levodopa  25/100 1 Tablet(s) Oral <User Schedule>  carbidopa/levodopa  25/100 1.5 Tablet(s) Oral <User Schedule>  cefepime   IVPB      cefepime   IVPB 500 milliGRAM(s) IV Intermittent every 12 hours  clopidogrel Tablet 75 milliGRAM(s) Oral daily  dextrose 5% + sodium chloride 0.9%. 1000 milliLiter(s) (70 mL/Hr) IV Continuous <Continuous>  docusate sodium 100 milliGRAM(s) Oral two times a day  enoxaparin Injectable 40 milliGRAM(s) SubCutaneous daily  memantine 5 milliGRAM(s) Oral at bedtime  metroNIDAZOLE  IVPB 500 milliGRAM(s) IV Intermittent every 8 hours  pantoprazole    Tablet 40 milliGRAM(s) Oral before breakfast  senna 2 Tablet(s) Oral at bedtime      MEDICATIONS  (PRN):       Medications up to date at time of exam.      PHYSICAL EXAMINATION:  Patient has no new complaints.  GENERAL: The patient is a well-developed, well-nourished, in no apparent distress.     Vital Signs Last 24 Hrs  T(C): 36.6 (23 Aug 2018 14:36), Max: 36.6 (22 Aug 2018 20:13)  T(F): 97.8 (23 Aug 2018 14:36), Max: 97.9 (23 Aug 2018 04:45)  HR: 77 (23 Aug 2018 14:36) (59 - 78)  BP: 117/67 (23 Aug 2018 14:36) (117/67 - 144/67)  BP(mean): --  RR: 16 (23 Aug 2018 14:36) (16 - 18)  SpO2: 98% (23 Aug 2018 14:36) (98% - 100%)   (if applicable)    Chest Tube (if applicable)    HEENT: Head is normocephalic and atraumatic. Extraocular muscles are intact. Mucous membranes are moist.     NECK: Supple, no palpable adenopathy.    LUNGS: Clear to auscultation, no wheezing, rales, or rhonchi.    HEART: Regular rate and rhythm without murmur.    ABDOMEN: Soft, nontender, and nondistended.  No hepatosplenomegaly is noted.    EXTREMITIES: Without any cyanosis, clubbing, rash, lesions or edema.    NEUROLOGIC: Awake, alert, oriented.     SKIN: Warm, dry, good turgor.      LABS:                        12.8   6.8   )-----------( 236      ( 23 Aug 2018 07:23 )             38.7     08    144  |  108  |  12  ----------------------------<  95  3.7   |  26  |  0.79    Ca    8.5      23 Aug 2018 07:23  Phos  3.6       Mg     2.1         TPro  6.7  /  Alb  3.5  /  TBili  0.7  /  DBili  x   /  AST  12  /  ALT  21  /  AlkPhos  99  08-22    PT/INR - ( 22 Aug 2018 06:30 )   PT: 12.6 sec;   INR: 1.15 ratio         PTT - ( 21 Aug 2018 18:45 )  PTT:32.3 sec  Urinalysis Basic - ( 21 Aug 2018 23:14 )    Color: Yellow / Appearance: Clear / S.010 / pH: x  Gluc: x / Ketone: Negative  / Bili: Negative / Urobili: Negative   Blood: x / Protein: Negative / Nitrite: Negative   Leuk Esterase: Negative / RBC: x / WBC x   Sq Epi: x / Non Sq Epi: x / Bacteria: x              Serum Pro-Brain Natriuretic Peptide: 76 pg/mL (18 @ 18:45)          MICROBIOLOGY: (if applicable)    RADIOLOGY & ADDITIONAL STUDIES:  EKG:   CXR:  ECHO:    IMPRESSION: 67y Male PAST MEDICAL & SURGICAL HISTORY:  HLD (hyperlipidemia)  HTN (hypertension)  Parkinson's disease  Blindness of right eye  CVA (cerebral vascular accident)  History of laminectomy   p/w     RECOMMENDATIONS: Time of Visit:  Patient seen and examined.     MEDICATIONS  (STANDING):  ALBUTerol/ipratropium for Nebulization 3 milliLiter(s) Nebulizer every 8 hours  amLODIPine   Tablet 5 milliGRAM(s) Oral daily  aspirin  chewable 81 milliGRAM(s) Oral daily  carbidopa/levodopa  25/100 1 Tablet(s) Oral <User Schedule>  carbidopa/levodopa  25/100 1.5 Tablet(s) Oral <User Schedule>  cefepime   IVPB      cefepime   IVPB 500 milliGRAM(s) IV Intermittent every 12 hours  clopidogrel Tablet 75 milliGRAM(s) Oral daily  dextrose 5% + sodium chloride 0.9%. 1000 milliLiter(s) (70 mL/Hr) IV Continuous <Continuous>  docusate sodium 100 milliGRAM(s) Oral two times a day  enoxaparin Injectable 40 milliGRAM(s) SubCutaneous daily  memantine 5 milliGRAM(s) Oral at bedtime  metroNIDAZOLE  IVPB 500 milliGRAM(s) IV Intermittent every 8 hours  pantoprazole    Tablet 40 milliGRAM(s) Oral before breakfast  senna 2 Tablet(s) Oral at bedtime      MEDICATIONS  (PRN):       Medications up to date at time of exam.      PHYSICAL EXAMINATION:  Patient has no new complaints.  GENERAL: The patient is a well-developed, well-nourished, in no apparent distress.     Vital Signs Last 24 Hrs  T(C): 36.6 (23 Aug 2018 14:36), Max: 36.6 (22 Aug 2018 20:13)  T(F): 97.8 (23 Aug 2018 14:36), Max: 97.9 (23 Aug 2018 04:45)  HR: 77 (23 Aug 2018 14:36) (59 - 78)  BP: 117/67 (23 Aug 2018 14:36) (117/67 - 144/67)  BP(mean): --  RR: 16 (23 Aug 2018 14:36) (16 - 18)  SpO2: 98% (23 Aug 2018 14:36) (98% - 100%)   (if applicable)    Chest Tube (if applicable)    HEENT: Head is normocephalic and atraumatic. Extraocular muscles are intact. Mucous membranes are moist.     NECK: Supple, no palpable adenopathy.    LUNGS: Clear to auscultation, no wheezing, rales, or rhonchi.    HEART: Regular rate and rhythm without murmur.    ABDOMEN: Soft, nontender, and nondistended.  No hepatosplenomegaly is noted.    EXTREMITIES: Without any cyanosis, clubbing, rash, lesions or edema.    NEUROLOGIC: Awake, alert, oriented.     SKIN: Warm, dry, good turgor.      LABS:                        12.8   6.8   )-----------( 236      ( 23 Aug 2018 07:23 )             38.7     08-23    144  |  108  |  12  ----------------------------<  95  3.7   |  26  |  0.79    Ca    8.5      23 Aug 2018 07:23  Phos  3.6       Mg     2.1         TPro  6.7  /  Alb  3.5  /  TBili  0.7  /  DBili  x   /  AST  12  /  ALT  21  /  AlkPhos  99  -22    PT/INR - ( 22 Aug 2018 06:30 )   PT: 12.6 sec;   INR: 1.15 ratio         PTT - ( 21 Aug 2018 18:45 )  PTT:32.3 sec  Urinalysis Basic - ( 21 Aug 2018 23:14 )    Color: Yellow / Appearance: Clear / S.010 / pH: x  Gluc: x / Ketone: Negative  / Bili: Negative / Urobili: Negative   Blood: x / Protein: Negative / Nitrite: Negative   Leuk Esterase: Negative / RBC: x / WBC x   Sq Epi: x / Non Sq Epi: x / Bacteria: x              Serum Pro-Brain Natriuretic Peptide: 76 pg/mL (18 @ 18:45)          MICROBIOLOGY: (if applicable)    RADIOLOGY & ADDITIONAL STUDIES:  EKG:   CXR:  ECHO:    IMPRESSION: 67y Male PAST MEDICAL & SURGICAL HISTORY:  HLD (hyperlipidemia)  HTN (hypertension)  Parkinson's disease  Blindness of right eye  CVA (cerebral vascular accident)  History of laminectomy   p/w       Pt is a 66 y/o wheel chair bound male with PMH of Parkinson's Disease, CVA, HTN , HLD who was brought to ED by his A for cough & SOB. As per ED provider note, A reports that pt had worsening sob & cough for one day for which he was brought to ED, there was no asociated chest pain or fever.  Pt was recently admitted ot [facility] on 2018 with similar complaints, Pt was worked up for pmeumonia and was found have aspiration pneumonia for which he was started on zosyn, levaquin, flagyl & maxipime which were later discontinued as he remained afebrile & cultures remained negative. Pt was evaluated by Speech and swallow who initially recommend npo with crushed meds with applesauce and later on with clinical improvement pureed & honey thick liquids by spoons as tolerated. I was unable to talk to home health aid, called the [place] adult care agency and received call back from Mr. Jennings agency representative([phone]) who confirmed medications and to call in morning to speak to pt's nurse and HHA. (22 Aug 2018 00:04)    --    Patient presents from home with SOB as per HHA Sanjuanita.     +SOB due to acute bronchitis? vs aspiration PNA,, however CT results may be due to previously tx aspiration PNA, no leukocytosis, no fevers    RVP negative  +atelectasis    SUGGESTION:   - con't with bronchodilators, o2 supplement as needed.    - f/u bcx, procalcitonin   - aspiration precautions   - swallow eval   - DVT and GI prophylaxis.

## 2018-08-23 NOTE — ADVANCED PRACTICE NURSE CONSULT - ASSESSMENT
This is a 67yr old male patient admitted for Pneumonitis, presenting with a L. Great Toe Unstageable Pressure Injury (2cm x 0.3cm) with eschar and no drainage

## 2018-08-23 NOTE — ADVANCED PRACTICE NURSE CONSULT - RECOMMEDATIONS
-Clean the L. Great Toe wound with normal saline and apply skin prep to the surrounding skin  -Apply non-adherent dry dressing, for protection Daily PRN  -Elevate/float the patients heels using heel protectors and reposition the patient Q 2hrs using wedges or pillows

## 2018-08-23 NOTE — CONSULT NOTE ADULT - SUBJECTIVE AND OBJECTIVE BOX
HPI:  Pt is a 68 y/o wheel chair bound male with PMH of Parkinson's Disease, CVA, HTN , HLD who was brought to ED by his HHA for cough & SOB. As per ED provider note, HHA reports that pt had worsening sob & cough for one day for which he was brought to ED, there was no asociated chest pain or fever.  Pt was recently admitted ot [facility] on 2018 with similar complaints, Pt was worked up for pmeumonia and was found have aspiration pneumonia for which he was started on zosyn, levaquin, flagyl & maxipime which were later discontinued as he remained afebrile & cultures remained negative. Pt was evaluated by Speech and swallow who initially recommend npo with crushed meds with applesauce and later on with clinical improvement pureed & honey thick liquids by spoons as tolerated. I was unable to talk to home health aid, called the [place] adult care agency and received call back from Mr. Jennings agency representative([phone]) who confirmed medications and to call in morning to speak to pt's nurse and HHA. (22 Aug 2018 00:04). afebrile on adm with stable bp, cxr neg , ct chest with RLL pneumonitis . ID called for eval of appr ab for asp pna       PAST MEDICAL & SURGICAL HISTORY:  HLD (hyperlipidemia)  HTN (hypertension)  Parkinson's disease  Blindness of right eye  CVA (cerebral vascular accident)  History of laminectomy    allergy   No Known Allergies    meds   ALBUTerol/ipratropium for Nebulization 3 milliLiter(s) Nebulizer every 8 hours  amLODIPine   Tablet 5 milliGRAM(s) Oral daily  aspirin  chewable 81 milliGRAM(s) Oral daily  carbidopa/levodopa  25/100 1 Tablet(s) Oral <User Schedule>  carbidopa/levodopa  25/100 1.5 Tablet(s) Oral <User Schedule>  cefepime   IVPB      cefepime   IVPB 500 milliGRAM(s) IV Intermittent every 12 hours  clopidogrel Tablet 75 milliGRAM(s) Oral daily  dextrose 5% + sodium chloride 0.9%. 1000 milliLiter(s) IV Continuous <Continuous>  docusate sodium 100 milliGRAM(s) Oral two times a day  enoxaparin Injectable 40 milliGRAM(s) SubCutaneous daily  memantine 5 milliGRAM(s) Oral at bedtime  metroNIDAZOLE  IVPB 500 milliGRAM(s) IV Intermittent every 8 hours  pantoprazole    Tablet 40 milliGRAM(s) Oral before breakfast  senna 2 Tablet(s) Oral at bedtime      Social Hx: unknown     FAMILY HISTORY:  No pertinent family history in first degree relatives        ROS  [  x] UNABLE TO ELICIT due to non verbal status     General:  [  ] Fever   [  ] Malaise    Skin:    HEENT:  [  ] Sore Throat  [  ] Photophobia	    Chest: [  ] SOB  [  ] Cough     Cardiovascular: [  ] CP	    Gastrointestinal: [  ] Abd pain   [  ] N    [  ] V   [  ] Diarrhea	    Genitourinary: [  ] Polyuria   [  ] Urgency   [  ] Dysuria    [  ]  Hematuria	    Musculoskeletal:  [  ] Back Pain	    Neurological: [  ]Dizziness  [  ]Visual Disturbance  [  ]Headaches      PHYSICAL EXAM:    Vital Signs Last 24 Hrs  T(C): 36.6 (23 Aug 2018 14:36), Max: 36.7 (22 Aug 2018 15:54)  T(F): 97.8 (23 Aug 2018 14:36), Max: 98 (22 Aug 2018 15:54)  HR: 77 (23 Aug 2018 14:36) (59 - 92)  BP: 117/67 (23 Aug 2018 14:36) (117/67 - 144/67)  BP(mean): --  RR: 16 (23 Aug 2018 14:36) (16 - 18)  SpO2: 98% (23 Aug 2018 14:36) (98% - 100%)    Constitutional: non verbal responds to his name    ENOCH SCLERA anicteric EOMI   LUNGS clear   CVS s1 s2 aud no murmurs   ABDOMEN soft non tender no HSM   NEUROLOGY  rigidity of xtremities    SKIN no rash   EXTREMITIES no edema no cyanosis         LABS/DIAGNOSTIC TESTS                          12.8   6.8   )-----------( 236      ( 23 Aug 2018 07:23 )             38.7     WBC Count: 6.8 K/uL ( @ 07:23)  WBC Count: 6.6 K/uL ( @ 06:30)  WBC Count: 10.4 K/uL ( @ 18:45)          144  |  108  |  12  ----------------------------<  95  3.7   |  26  |  0.79    Ca    8.5      23 Aug 2018 07:23  Phos  3.6       Mg     2.1         TPro  6.7  /  Alb  3.5  /  TBili  0.7  /  DBili  x   /  AST  12  /  ALT  21  /  AlkPhos  99        Urinalysis Basic - ( 21 Aug 2018 23:14 )    Color: Yellow / Appearance: Clear / S.010 / pH: x  Gluc: x / Ketone: Negative  / Bili: Negative / Urobili: Negative   Blood: x / Protein: Negative / Nitrite: Negative   Leuk Esterase: Negative / RBC: x / WBC x   Sq Epi: x / Non Sq Epi: x / Bacteria: x        LIVER FUNCTIONS - ( 22 Aug 2018 06:30 )  Alb: 3.5 g/dL / Pro: 6.7 g/dL / ALK PHOS: 99 U/L / ALT: 21 U/L DA / AST: 12 U/L / GGT: x             PT/INR - ( 22 Aug 2018 06:30 )   PT: 12.6 sec;   INR: 1.15 ratio         PTT - ( 21 Aug 2018 18:45 )  PTT:32.3 sec    LACTATE: Lactate, Blood (18 @ 18:45)    Lactate, Blood: 1.2 mmol/L          Culture Results: urine   <10,000 CFU/ml  Normal Urogenital kareem present ( @ 09:15)  Culture Results: blood  No growth to date. ( @ 23:40)  Culture Results: blood  No growth to date. ( @ 23:40)        RADIOLOGY  < from: US Thyroid + Parathyroid (18 @ 10:34) >    EXAM:  US THYROID PARATHYROID                            PROCEDURE DATE:  2018          INTERPRETATION:  CLINICAL INFORMATION: Thyroid nodule on CT scan    COMPARISON: Chest CT 2018    TECHNIQUE: Sonography of the thyroid.     FINDINGS:    Right Lobe: 4.9 x 1.6 x 2.5 cm. 6 x 4 x 5 mm nodule and 4 x 3 x 4 mm   mildly complex small cysts.    Left Lobe: 4.8 x 1.5 x 1.7 cm. there are 2 simple cyst one measures 10 x   8 x 10 mm and the other 7 x 6 x 8 mm.    Isthmus: 8 mm.        CervicalLymph Nodes: No enlarged or abnormal morphology cervical nodes.    IMPRESSION:     Numerous small simple and mildly complex colloid cysts throughout both   lobes. No solid nodules.    < end of copied text >      < from: CT Chest No Cont (..18 @ 09:40) >  EXAM:  CT CHEST                            PROCEDURE DATE:  2018          INTERPRETATION:  CLINICAL INFORMATION: Cough; shortness of breath;   clinical concern for pneumonia    COMPARISON: CT scan of the abdomen and pelvis acquired 2018, chest   x-ray acquired 2018.    PROCEDURE:   CT of the Chest was performed without intravenous contrast.  Sagittal and coronal reformats were performed.    Maximum intensity projection images were generated.       FINDINGS:    LUNGS AND LARGE AIRWAYS: Mild amount of secretions within the trachea.   Patent central airways.  Mild diffuse bronchial wall thickening.    Mild upper lung predominant centrilobular emphysema.    Mild basilar predominant peripheral reticulations, concerning for   asbestosis. Patchy groundglass opacities predominantly within the   dependent portion of right lower lobe may represent atypical infection,   pneumonitis or manifestation of interstitial lung disease secondary to   asbestosis.    PLEURA: Extensive bilateral calcified pleural plaques, left greater than   the right and within the diaphragmatic surface of the left lower lung.    Small left pleural effusion, unchanged.    VESSELS: The descending aorta is mildly dilated measuring 4.2 cm. The   descending aorta and aortic arch are normal in diameter.    The main pulmonary arteries normal in diameter.    Multivessel coronary atherosclerosis. Mild aortic atherosclerosis.    HEART: Heart size is normal. No pericardial effusion. Atherosclerotic   changes of the LAD, and circumflex arteries.     MEDIASTINUM AND MARLENY: No lymphadenopathy.    CHEST WALL AND LOWER NECK: Approximately 1 cm right thyroid nodule.    VISUALIZED UPPER ABDOMEN: cholelithiasis.    BONES: Old fracture deformity of the left seventh rib. Mild fracture   deformity of T10 and L1 vertebral bodies.    IMPRESSION:     *  Extensive bilateral calcified pleural plaques, left greater than the   right likely secondary to prior asbestosis exposure.    *  Mild basilar predominant peripheral reticulations, concerning for   asbestosis. Recommend follow-up chest CT with prone and supine technique   for further evaluation.    *  Patchy groundglass opacities predominantly within the dependent   portion of right lower lobe may represent atypicalinfection, pneumonitis   or manifestation of interstitial lung disease secondary to asbestosis.   Recommend follow-up in 3-4 weeks with supine and prone CT imaging.    *  Coronary atherosclerosis.    *  Approximately 1 cm right thyroid nodule. Recommend further evaluation   with ultrasound.    *  Mild fracture deformity of T10 and L1 vertebral bodies.            TANO EPPERSON M.D., RADIOLOGY RESIDENT  This document has been electronically signed.  EVANGELINA GUARDADO M.D., ATTENDING RADIOLOGIST  This document has been electronically signed. Aug 22 2018  3:08PM        < end of copied text >

## 2018-08-24 DIAGNOSIS — E04.9 NONTOXIC GOITER, UNSPECIFIED: ICD-10-CM

## 2018-08-24 DIAGNOSIS — J69.0 PNEUMONITIS DUE TO INHALATION OF FOOD AND VOMIT: ICD-10-CM

## 2018-08-24 RX ADMIN — Medication 3 MILLILITER(S): at 05:07

## 2018-08-24 RX ADMIN — CEFEPIME 100 MILLIGRAM(S): 1 INJECTION, POWDER, FOR SOLUTION INTRAMUSCULAR; INTRAVENOUS at 18:05

## 2018-08-24 RX ADMIN — CARBIDOPA AND LEVODOPA 1 TABLET(S): 25; 100 TABLET ORAL at 18:04

## 2018-08-24 RX ADMIN — PANTOPRAZOLE SODIUM 40 MILLIGRAM(S): 20 TABLET, DELAYED RELEASE ORAL at 05:24

## 2018-08-24 RX ADMIN — AMLODIPINE BESYLATE 5 MILLIGRAM(S): 2.5 TABLET ORAL at 05:23

## 2018-08-24 RX ADMIN — CARBIDOPA AND LEVODOPA 1.5 TABLET(S): 25; 100 TABLET ORAL at 05:24

## 2018-08-24 RX ADMIN — Medication 3 MILLILITER(S): at 21:12

## 2018-08-24 RX ADMIN — SENNA PLUS 2 TABLET(S): 8.6 TABLET ORAL at 21:35

## 2018-08-24 RX ADMIN — Medication 3 MILLILITER(S): at 15:14

## 2018-08-24 RX ADMIN — Medication 100 MILLIGRAM(S): at 21:35

## 2018-08-24 RX ADMIN — CEFEPIME 100 MILLIGRAM(S): 1 INJECTION, POWDER, FOR SOLUTION INTRAMUSCULAR; INTRAVENOUS at 05:22

## 2018-08-24 RX ADMIN — CLOPIDOGREL BISULFATE 75 MILLIGRAM(S): 75 TABLET, FILM COATED ORAL at 11:53

## 2018-08-24 RX ADMIN — Medication 100 MILLIGRAM(S): at 14:30

## 2018-08-24 RX ADMIN — CARBIDOPA AND LEVODOPA 1.5 TABLET(S): 25; 100 TABLET ORAL at 15:46

## 2018-08-24 RX ADMIN — CARBIDOPA AND LEVODOPA 1 TABLET(S): 25; 100 TABLET ORAL at 11:53

## 2018-08-24 RX ADMIN — ENOXAPARIN SODIUM 40 MILLIGRAM(S): 100 INJECTION SUBCUTANEOUS at 11:53

## 2018-08-24 RX ADMIN — MEMANTINE HYDROCHLORIDE 5 MILLIGRAM(S): 10 TABLET ORAL at 21:35

## 2018-08-24 RX ADMIN — Medication 81 MILLIGRAM(S): at 11:53

## 2018-08-24 RX ADMIN — Medication 100 MILLIGRAM(S): at 05:23

## 2018-08-24 RX ADMIN — Medication 100 MILLIGRAM(S): at 17:22

## 2018-08-24 RX ADMIN — Medication 100 MILLIGRAM(S): at 05:22

## 2018-08-24 NOTE — CHART NOTE - NSCHARTNOTEFT_GEN_A_CORE
When patient advised of discharge planning possibly to home with HHA his skin turned red and he screamed no.  He state verbally, "yes," that he is having a problem with his HHA.   advised and will contact home health agency.  Discharge delayed due to concern regarding safe discharge.

## 2018-08-24 NOTE — DISCHARGE NOTE ADULT - CARE PROVIDERS DIRECT ADDRESSES
,DirectAddress_Unknown ,DirectAddress_Unknown,[email or web address] ,[email or web address],DirectAddress_Unknown,DirectAddress_Unknown,DirectAddress_Unknown

## 2018-08-24 NOTE — DISCHARGE NOTE ADULT - ADDITIONAL INSTRUCTIONS
CT chest w/o contrast requested for verification and results revealed:   *  Extensive bilateral calcified pleural plaques, left greater than the   right likely secondary to prior asbestosis exposure.    Dr. Garber, endocrinologist, consulted for  Goiter, nodular; euthyroid sub cm nodular goiter  Follow up ultrasound in 6 months    *  Mild basilar predominant peripheral reticulations, concerning for   asbestosis. Recommend follow-up chest CT with prone and supine technique   for further evaluation.  *  Patchy groundglass opacities predominantly within the dependent   portion of right lower lobe may represent atypicalinfection, pneumonitis   or manifestation of interstitial lung disease secondary to asbestosis.   Recommend follow-up in 3-4 weeks with supine and prone CT imaging. CT chest w/o contrast requested for verification and results revealed:   *  Extensive bilateral calcified pleural plaques, left greater than the   right likely secondary to prior asbestosis exposure.  *  Mild basilar predominant peripheral reticulations, concerning for   asbestosis. Recommend follow-up chest CT with prone and supine technique   for further evaluation.  *  Patchy groundglass opacities predominantly within the dependent   portion of right lower lobe may represent atypical infection, pneumonitis   or manifestation of interstitial lung disease secondary to asbestosis.   Recommend follow-up in 3-4 weeks with supine and prone CT imaging.    Dr. Garber, endocrinologist, consulted for  Goiter, nodular; euthyroid sub cm nodular goiter  Follow up ultrasound in 6 months    Wound Care:  Cleanse with NS then apply skin prep to the surrounding skin and cover with non-adherent dry dressing, for protection Daily and PRN  Elevate/float heels with protectors and reposition the patient Q 2hrs using wedges or pillow

## 2018-08-24 NOTE — DISCHARGE NOTE ADULT - MEDICATION SUMMARY - MEDICATIONS TO TAKE
I will START or STAY ON the medications listed below when I get home from the hospital:    aspirin 81 mg oral tablet, chewable  -- 1 tab(s) by mouth once a day  -- Indication: For CAD    carbidopa-levodopa 25 mg-100 mg oral tablet  -- 1 tab(s) by mouth 2 times a day at 11:00 and 19:00   -- Indication: For Parkinson's disease    carbidopa-levodopa 25 mg-100 mg oral tablet  -- 1.5 tab(s) by mouth 2 times a day at 07:00 and 15:00  -- Indication: For Parkinson's disease    clopidogrel 75 mg oral tablet  -- 1 tab(s) by mouth once a day  -- Indication: For CAD    amLODIPine 5 mg oral tablet  -- 1 tab(s) by mouth once a day  -- Indication: For HTN (hypertension)    docusate sodium 100 mg oral capsule  -- 1 cap(s) by mouth 2 times a day  -- Indication: For Constipation    senna oral tablet  -- 2 tab(s) by mouth once a day (at bedtime)  -- Indication: For Constipation    memantine 5 mg oral tablet  -- 1 tab(s) by mouth once a day (at bedtime)  -- Indication: For Dementia    pantoprazole 40 mg oral delayed release tablet  -- 1 tab(s) by mouth once a day (before a meal)  -- Indication: For GI ppx

## 2018-08-24 NOTE — DISCHARGE NOTE ADULT - PLAN OF CARE
resolution You have completed antibiotics.  Followup with your PCP in one week. A swallow eval was performed and recommendation was for Pureed diet with honey thickened fluids Continue with meds/DASH TLC diet Continue with meds/DASH diet Followup with neurologist in one week for possible adjustment with medication Continue with home meds A swallow eval was performed and recommendation was for Pureed diet with honey thickened fluids  FULL ASPIRATION PRECAUTIONS

## 2018-08-24 NOTE — DISCHARGE NOTE ADULT - CARE PROVIDER_API CALL
Alber Nicole  Phone: [phone]  Fax: (       - Alber Nicole  Phone: [phone]  Fax: (   )    -    Bebe Fernandez (MD), Neurology  [address] [place] [address] [place] [address]  Phone: [phone]  Fax: [phone] Bebe Fernandez (MD), Neurology  [address]  [address] [place] [address]  Phone: [phone]  Fax: [phone]    Alber Nicole  Phone: [phone]  Fax: (   )    -    Claribel Garber), EndocrinologyMetabDiabetes  [address] [place] [address]  Phone: [phone]  Fax: [phone]    Flaco Harmon), Internal Medicine; Pulmonary Disease  [address] [place] [address]  Phone: [phone]  Fax: [phone]

## 2018-08-24 NOTE — DISCHARGE NOTE ADULT - SECONDARY DIAGNOSIS.
Dysphasia HLD (hyperlipidemia) HTN (hypertension) Parkinson's disease CVA (cerebral vascular accident)

## 2018-08-24 NOTE — DISCHARGE NOTE ADULT - PROVIDER TOKENS
FREE:[LAST:[Bonnie],FIRST:[Alber],PHONE:[phone]],FAX:[(   )    -]] FREE:[LAST:[Bonnie],FIRST:[Alber],PHONE:[phone]],FAX:[(   )    -]],TOKEN:'[id number]' TOKEN:'[id number]',FREE:[LAST:[Bonnie],FIRST:[Alber],PHONE:[phone]],FAX:[(   )    -]],TOKEN:'[id number]',TOKEN:'[id number]'

## 2018-08-24 NOTE — PROGRESS NOTE ADULT - SUBJECTIVE AND OBJECTIVE BOX
BIJAN KHAN  MR# [id number]  67yMale        Patient is a 67y old  Male who presents with a chief complaint of SOB & cough (24 Aug 2018 14:36)      INTERVAL HPI/OVERNIGHT EVENTS:  Patient seen and examined at bedside. No notations of chest pain, palpitation, SOB, orthopnea, nausea, vomiting or abdominal pain.    ALLERGIES  No Known Allergies      MEDICATIONS  ALBUTerol/ipratropium for Nebulization 3 milliLiter(s) Nebulizer every 8 hours  amLODIPine   Tablet 5 milliGRAM(s) Oral daily  aspirin  chewable 81 milliGRAM(s) Oral daily  carbidopa/levodopa  25/100 1 Tablet(s) Oral <User Schedule>  carbidopa/levodopa  25/100 1.5 Tablet(s) Oral <User Schedule>  cefepime   IVPB      cefepime   IVPB 500 milliGRAM(s) IV Intermittent every 12 hours  clopidogrel Tablet 75 milliGRAM(s) Oral daily  dextrose 5% + sodium chloride 0.9%. 1000 milliLiter(s) IV Continuous <Continuous>  docusate sodium 100 milliGRAM(s) Oral two times a day  enoxaparin Injectable 40 milliGRAM(s) SubCutaneous daily  memantine 5 milliGRAM(s) Oral at bedtime  metroNIDAZOLE  IVPB 500 milliGRAM(s) IV Intermittent every 8 hours  pantoprazole    Tablet 40 milliGRAM(s) Oral before breakfast  senna 2 Tablet(s) Oral at bedtime              REVIEW OF SYSTEMS:  CONSTITUTIONAL: No fever, weight loss, or fatigue  EYES: No eye pain, visual disturbances, or discharge  ENT:  No difficulty hearing, tinnitus, vertigo; No sinus or throat pain  NECK: No pain or stiffness  RESPIRATORY: No cough, wheezing, chills or hemoptysis; No Shortness of Breath  CARDIOVASCULAR: No chest pain, palpitations, passing out, dizziness, or leg swelling  GASTROINTESTINAL: No abdominal or epigastric pain. No nausea, vomiting, or hematemesis; No diarrhea or constipation. No melena or hematochezia.  GENITOURINARY: No dysuria, frequency, hematuria, or incontinence  NEUROLOGICAL: No headaches, memory loss, loss of strength, numbness, or tremors  SKIN: No itching, burning, rashes, or lesions   LYMPH Nodes: No enlarged glands  ENDOCRINE: No heat or cold intolerance; No hair loss  MUSCULOSKELETAL: No joint pain or swelling; No muscle, back, or extremity pain  PSYCHIATRIC: No depression, anxiety, mood swings, or difficulty sleeping  HEME/LYMPH: No easy bruising, or bleeding gums  ALLERGY AND IMMUNOLOGIC: No hives or eczema	    [ ] All others negative	  [ ] Unable to obtain      T(C): 37.1 (08-24-18 @ 14:37), Max: 37.1 (08-24-18 @ 14:37)  T(F): 98.8 (08-24-18 @ 14:37), Max: 98.8 (08-24-18 @ 14:37)  HR: 79 (08-24-18 @ 14:37) (66 - 82)  BP: 122/63 (08-24-18 @ 14:37) (122/63 - 125/66)  RR: 16 (08-24-18 @ 14:37) (16 - 16)  SpO2: 98% (08-24-18 @ 14:37) (98% - 100%)  Wt(kg): --    I&O's Summary        PHYSICAL EXAM:  A X O x  HEAD:  Atraumatic, Normocephalic  EYES: EOMI, PERRLA, conjunctiva and sclera clear  NECK: Supple, No JVD, Normal thyroid  Resp: CTAB, No crackles, wheezing,   CVS: Regular rate and rhythm; No discernable murmurs, rubs, or gallops  ABD: Soft, Nontender, Nondistended; Bowel sounds present  EXTREMITIES:  2+ Peripheral Pulses, No edema  LYMPH: No dicernable lymphadenopathy noted  GENERAL: NAD, well-groomed, well-developed      LABS:                        12.8   6.8   )-----------( 236      ( 23 Aug 2018 07:23 )             38.7     08-23    144  |  108  |  12  ----------------------------<  95  3.7   |  26  |  0.79    Ca    8.5      23 Aug 2018 07:23  Phos  3.6     08-23  Mg     2.1     08-23          CAPILLARY BLOOD GLUCOSE          Troponins:  ProBNP:  Lipid Profile:   HgA1c:  TSH:           RADIOLOGY & ADDITIONAL TESTS:    Imaging Personally Reviewed:  [ ] YES  [ ] NO      Consultant(s) Notes Reviewed:  [x ] YES  [ ] NO    Care Discussed with Consultants/Other Providers [ x] YES  [ ] NO          PAST MEDICAL & SURGICAL HISTORY:  HLD (hyperlipidemia)  HTN (hypertension)  Parkinson's disease  Blindness of right eye  CVA (cerebral vascular accident)  History of laminectomy        Pneumonitis due to inhalation of food or vomitus  No pertinent family history in first degree relatives  Handoff  MEWS Score  HLD (hyperlipidemia)  HTN (hypertension)  Parkinson's disease  Blindness of right eye  CVA (cerebral vascular accident)  HLD (hyperlipidemia)  HTN (hypertension)  Parkinson's disease  Aspiration pneumonia  Aspiration pneumonia  Goiter, nodular  Goals of care, counseling/discussion  Swallowing difficulty  Prophylactic measure  CVA (cerebral vascular accident)  HLD (hyperlipidemia)  HTN (hypertension)  Parkinson's disease  SOB (shortness of breath)  History of laminectomy  A-DIFFICULTY SWOLLING  22  Dysphasia

## 2018-08-24 NOTE — PROGRESS NOTE ADULT - SUBJECTIVE AND OBJECTIVE BOX
Subjective: lying in bed , awake , non verbal       PHYSICAL EXAM:    Vital Signs Last 24 Hrs  T(C): 36.9 (24 Aug 2018 20:43), Max: 37.1 (24 Aug 2018 14:37)  T(F): 98.4 (24 Aug 2018 20:43), Max: 98.8 (24 Aug 2018 14:37)  HR: 87 (24 Aug 2018 20:43) (66 - 87)  BP: 125/76 (24 Aug 2018 20:43) (122/63 - 125/76)  BP(mean): --  RR: 15 (24 Aug 2018 20:43) (15 - 16)  SpO2: 99% (24 Aug 2018 20:43) (98% - 100%)      Constitutional: non verbal responds to his name    ENOCH SCLERA anicteric EOMI   LUNGS clear   CVS s1 s2 aud no murmurs   ABDOMEN soft non tender no HSM   NEUROLOGY  rigidity of xtremities    SKIN no rash   EXTREMITIES no edema no cyanosis       LABS/DIAGNOSTIC TESTS                        12.8   6.8   )-----------( 236      ( 23 Aug 2018 07:23 )             38.7     08-23    144  |  108  |  12  ----------------------------<  95  3.7   |  26  |  0.79    Ca    8.5      23 Aug 2018 07:23  Phos  3.6     08-23  Mg     2.1     08-23      Procalcitonin, Serum (08.23.18 @ 20:24)    Procalcitonin, Serum: 0.04: This assay is intended for use to determine the change of PCT over time  as an aid in assessing the cumulative 28-day risk of all-cause mortality  for patients diagnosed with severe sepsis or septic shock in the ICU, or  when obtained in the emergency department or other medical wards prior to  ICU admission. This assay was performed by the Roche ZAPRs FUNGO STUDIOSS PCT  assay. ng/mL          meds   ALBUTerol/ipratropium for Nebulization 3 milliLiter(s) Nebulizer every 8 hours  amLODIPine   Tablet 5 milliGRAM(s) Oral daily  aspirin  chewable 81 milliGRAM(s) Oral daily  carbidopa/levodopa  25/100 1 Tablet(s) Oral <User Schedule>  carbidopa/levodopa  25/100 1.5 Tablet(s) Oral <User Schedule>  cefepime   IVPB      cefepime   IVPB 500 milliGRAM(s) IV Intermittent every 12 hours  clopidogrel Tablet 75 milliGRAM(s) Oral daily  dextrose 5% + sodium chloride 0.9%. 1000 milliLiter(s) IV Continuous <Continuous>  docusate sodium 100 milliGRAM(s) Oral two times a day  enoxaparin Injectable 40 milliGRAM(s) SubCutaneous daily  memantine 5 milliGRAM(s) Oral at bedtime  metroNIDAZOLE  IVPB 500 milliGRAM(s) IV Intermittent every 8 hours  pantoprazole    Tablet 40 milliGRAM(s) Oral before breakfast  senna 2 Tablet(s) Oral at bedtime        CULTURES  Culture Results: urine   <10,000 CFU/ml  Normal Urogenital kareem present (08-22 @ 09:15)  Culture Results: blood  No growth to date. (08-21 @ 23:40)  Culture Results: blood  No growth to date. (08-21 @ 23:40)        RADIOLOGY    < from: CT Chest No Cont (08.22.18 @ 09:40) >    EXAM:  CT CHEST                            PROCEDURE DATE:  08/22/2018          INTERPRETATION:  CLINICAL INFORMATION: Cough; shortness of breath;   clinical concern for pneumonia    COMPARISON: CT scan of the abdomen and pelvis acquired 6/5/2018, chest   x-ray acquired 8/21/2018.    PROCEDURE:   CT of the Chest was performed without intravenous contrast.  Sagittal and coronal reformats were performed.    Maximum intensity projection images were generated.       FINDINGS:    LUNGS AND LARGE AIRWAYS: Mild amount of secretions within the trachea.   Patent central airways.  Mild diffuse bronchial wall thickening.    Mild upper lung predominant centrilobular emphysema.    Mild basilar predominant peripheral reticulations, concerning for   asbestosis. Patchy groundglass opacities predominantly within the   dependent portion of right lower lobe may represent atypical infection,   pneumonitis or manifestation of interstitial lung disease secondary to   asbestosis.    PLEURA: Extensive bilateral calcified pleural plaques, left greater than   the right and within the diaphragmatic surface of the left lower lung.    Small left pleural effusion, unchanged.    VESSELS: The descending aorta is mildly dilated measuring 4.2 cm. The   descending aorta and aortic arch are normal in diameter.    The main pulmonary arteries normal in diameter.    Multivessel coronary atherosclerosis. Mild aortic atherosclerosis.    HEART: Heart size is normal. No pericardial effusion. Atherosclerotic   changes of the LAD, and circumflex arteries.     MEDIASTINUM AND MARLENY: No lymphadenopathy.    CHEST WALL AND LOWER NECK: Approximately 1 cm right thyroid nodule.    VISUALIZED UPPER ABDOMEN: cholelithiasis.    BONES: Old fracture deformity of the left seventh rib. Mild fracture   deformity of T10 and L1 vertebral bodies.    IMPRESSION:     *  Extensive bilateral calcified pleural plaques, left greater than the   right likely secondary to prior asbestosis exposure.    *  Mild basilar predominant peripheral reticulations, concerning for   asbestosis. Recommend follow-up chest CT with prone and supine technique   for further evaluation.    *  Patchy groundglass opacities predominantly within the dependent   portion of right lower lobe may represent atypicalinfection, pneumonitis   or manifestation of interstitial lung disease secondary to asbestosis.   Recommend follow-up in 3-4 weeks with supine and prone CT imaging.    *  Coronary atherosclerosis.    *  Approximately 1 cm right thyroid nodule. Recommend further evaluation   with ultrasound.    *  Mild fracture deformity of T10 and L1 vertebral bodies.            TANO EPPERSON M.D., RADIOLOGY RESIDENT  This document has been electronically signed.  EVANGELINA GUARDADO M.D., ATTENDING RADIOLOGIST  This document has been electronically signed. Aug 22 2018  3:08PM    < end of copied text >

## 2018-08-24 NOTE — PROGRESS NOTE ADULT - SUBJECTIVE AND OBJECTIVE BOX
Time of Visit:  Patient seen and examined.     MEDICATIONS  (STANDING):  ALBUTerol/ipratropium for Nebulization 3 milliLiter(s) Nebulizer every 8 hours  amLODIPine   Tablet 5 milliGRAM(s) Oral daily  aspirin  chewable 81 milliGRAM(s) Oral daily  carbidopa/levodopa  25/100 1 Tablet(s) Oral <User Schedule>  carbidopa/levodopa  25/100 1.5 Tablet(s) Oral <User Schedule>  cefepime   IVPB      cefepime   IVPB 500 milliGRAM(s) IV Intermittent every 12 hours  clopidogrel Tablet 75 milliGRAM(s) Oral daily  dextrose 5% + sodium chloride 0.9%. 1000 milliLiter(s) (70 mL/Hr) IV Continuous <Continuous>  docusate sodium 100 milliGRAM(s) Oral two times a day  enoxaparin Injectable 40 milliGRAM(s) SubCutaneous daily  memantine 5 milliGRAM(s) Oral at bedtime  metroNIDAZOLE  IVPB 500 milliGRAM(s) IV Intermittent every 8 hours  pantoprazole    Tablet 40 milliGRAM(s) Oral before breakfast  senna 2 Tablet(s) Oral at bedtime      MEDICATIONS  (PRN):       Medications up to date at time of exam.    ROS; No fever, chills, cough, congestion.  PHYSICAL EXAMINATION:    Vital Signs Last 24 Hrs  T(C): 36.7 (24 Aug 2018 06:10), Max: 36.8 (23 Aug 2018 20:30)  T(F): 98 (24 Aug 2018 06:10), Max: 98.3 (23 Aug 2018 20:30)  HR: 66 (24 Aug 2018 06:10) (66 - 82)  BP: 124/68 (24 Aug 2018 06:10) (117/67 - 125/66)  BP(mean): --  RR: 16 (24 Aug 2018 06:10) (16 - 16)  SpO2: 100% (24 Aug 2018 06:10) (98% - 100%)   (if applicable)    General; Awake and not in any distress.     HEENT: Head is normocephalic and atraumatic. No nasal tenderness.  Moist mucosa.     NECK: Supple, no palpable adenopathy.    LUNGS: Clear to auscultation, no wheezing, rales, or rhonchi. No use of accessory muscle.    HEART: S1 S2 Regular rate and no click/rub .    ABDOMEN: Soft, nontender, and nondistended.  No hepatosplenomegaly is noted. Active bowel sounds.    EXTREMITIES: Without any cyanosis, clubbing, rash, lesions or edema.    NEUROLOGIC: Awake, alert, forgetful, language barrier.     SKIN: Warm and moist.   LABS:                        12.8   6.8   )-----------( 236      ( 23 Aug 2018 07:23 )             38.7     08-23    144  |  108  |  12  ----------------------------<  95  3.7   |  26  |  0.79    Ca    8.5      23 Aug 2018 07:23  Phos  3.6     08-23  Mg     2.1     08-23    Serum Pro-Brain Natriuretic Peptide: 76 pg/mL (08-21-18 @ 18:45)      Procalcitonin, Serum: 0.04 ng/mL (08-23-18 @ 20:24)      RADIOLOGY & ADDITIONAL STUDIES:  CT Chest:  < from: CT Chest No Cont (08.22.18 @ 09:40) >    EXAM:  CT CHEST                            PROCEDURE DATE:  08/22/2018          INTERPRETATION:  CLINICAL INFORMATION: Cough; shortness of breath;   clinical concern for pneumonia    COMPARISON: CT scan of the abdomen and pelvis acquired 6/5/2018, chest   x-ray acquired 8/21/2018.    PROCEDURE:   CT of the Chest was performed without intravenous contrast.  Sagittal and coronal reformats were performed.    Maximum intensity projection images were generated.       FINDINGS:    LUNGS AND LARGE AIRWAYS: Mild amount of secretions within the trachea.   Patent central airways.  Mild diffuse bronchial wall thickening.    Mild upper lung predominant centrilobular emphysema.    Mild basilar predominant peripheral reticulations, concerning for   asbestosis. Patchy groundglass opacities predominantly within the   dependent portion of right lower lobe may represent atypical infection,   pneumonitis or manifestation of interstitial lung disease secondary to   asbestosis.    PLEURA: Extensive bilateral calcified pleural plaques, left greater than   the right and within the diaphragmatic surface of the left lower lung.    Small left pleural effusion, unchanged.    VESSELS: The descending aorta is mildly dilated measuring 4.2 cm. The   descending aorta and aortic arch are normal in diameter.    The main pulmonary arteries normal in diameter.    Multivessel coronary atherosclerosis. Mild aortic atherosclerosis.    HEART: Heart size is normal. No pericardial effusion. Atherosclerotic   changes of the LAD, and circumflex arteries.     MEDIASTINUM AND MARLENY: No lymphadenopathy.    CHEST WALL AND LOWER NECK: Approximately 1 cm right thyroid nodule.    VISUALIZED UPPER ABDOMEN: cholelithiasis.    BONES: Old fracture deformity of the left seventh rib. Mild fracture   deformity of T10 and L1 vertebral bodies.    IMPRESSION:     *  Extensive bilateral calcified pleural plaques, left greater than the   right likely secondary to prior asbestosis exposure.    *  Mild basilar predominant peripheral reticulations, concerning for   asbestosis. Recommend follow-up chest CT with prone and supine technique   for further evaluation.    *  Patchy groundglass opacities predominantly within the dependent   portion of right lower lobe may represent atypicalinfection, pneumonitis   or manifestation of interstitial lung disease secondary to asbestosis.   Recommend follow-up in 3-4 weeks with supine and prone CT imaging.    *  Coronary atherosclerosis.    *  Approximately 1 cm right thyroid nodule. Recommend further evaluation   with ultrasound.    *  Mild fracture deformity of T10 and L1 vertebral bodies.    < end of copied text >    CXR: < from: Xray Chest 1 View AP/PA (08.21.18 @ 19:25) >    EXAM:  XR CHEST AP OR PA 1V                            PROCEDURE DATE:  08/21/2018          INTERPRETATION:  CLINICAL INFORMATION: Shortness of breath, cough    EXAM: AP view of chest performed on 8/21/2018    COMPARISON: 7/29/2018.    FINDINGS:  The lungs are clear. Stable calcific pleural plaque along the upper left   chest wall. No pleural effusion or pneumothorax.  The cardiac silhouette is magnified on this projection.  The osseous structures are unremarkable.    IMPRESSION:  Clear lungs    PAST MEDICAL & SURGICAL HISTORY:  HLD (hyperlipidemia)  HTN (hypertension)  Parkinson's disease  Blindness of right eye  CVA (cerebral vascular accident)  History of laminectomy     Impression; 68 Y/O Male with prior mentioned multiple chronic conditions. Presented with SOB due to acute bronchitis? vs aspiration PNA,, however CT results may be due to previously tx aspiration PNA, been Afebrile. RVP negative. CT chest showed with ground glass patchy opacity with Small Left pleural Effusion. RVP, BLD Cx x 2 negative.    Suggestion;  Oxygen supplementation if needed. O2 saturation 96% room air.  Continue DuoNeb Q 6 hours.  Continue antibiotic.  DVT/ GI prophylactic.  Aspiration precautions. Time of Visit:  Patient seen and examined.     MEDICATIONS  (STANDING):  ALBUTerol/ipratropium for Nebulization 3 milliLiter(s) Nebulizer every 8 hours  amLODIPine   Tablet 5 milliGRAM(s) Oral daily  aspirin  chewable 81 milliGRAM(s) Oral daily  carbidopa/levodopa  25/100 1 Tablet(s) Oral <User Schedule>  carbidopa/levodopa  25/100 1.5 Tablet(s) Oral <User Schedule>  cefepime   IVPB      cefepime   IVPB 500 milliGRAM(s) IV Intermittent every 12 hours  clopidogrel Tablet 75 milliGRAM(s) Oral daily  dextrose 5% + sodium chloride 0.9%. 1000 milliLiter(s) (70 mL/Hr) IV Continuous <Continuous>  docusate sodium 100 milliGRAM(s) Oral two times a day  enoxaparin Injectable 40 milliGRAM(s) SubCutaneous daily  memantine 5 milliGRAM(s) Oral at bedtime  metroNIDAZOLE  IVPB 500 milliGRAM(s) IV Intermittent every 8 hours  pantoprazole    Tablet 40 milliGRAM(s) Oral before breakfast  senna 2 Tablet(s) Oral at bedtime      MEDICATIONS  (PRN):       Medications up to date at time of exam.    ROS; No fever, chills, cough, congestion.  PHYSICAL EXAMINATION:    Vital Signs Last 24 Hrs  T(C): 36.7 (24 Aug 2018 06:10), Max: 36.8 (23 Aug 2018 20:30)  T(F): 98 (24 Aug 2018 06:10), Max: 98.3 (23 Aug 2018 20:30)  HR: 66 (24 Aug 2018 06:10) (66 - 82)  BP: 124/68 (24 Aug 2018 06:10) (117/67 - 125/66)  BP(mean): --  RR: 16 (24 Aug 2018 06:10) (16 - 16)  SpO2: 100% (24 Aug 2018 06:10) (98% - 100%)   (if applicable)    General; Awake and not in any distress.     HEENT: Head is normocephalic and atraumatic. No nasal tenderness.  Moist mucosa.     NECK: Supple, no palpable adenopathy.    LUNGS: Clear to auscultation, no wheezing, rales, or rhonchi. No use of accessory muscle.    HEART: S1 S2 Regular rate and no click/rub .    ABDOMEN: Soft, nontender, and nondistended.  No hepatosplenomegaly is noted. Active bowel sounds.    EXTREMITIES: Without any cyanosis, clubbing, rash, lesions or edema.    NEUROLOGIC: Awake, alert, forgetful, language barrier.     SKIN: Warm and moist.   LABS:                        12.8   6.8   )-----------( 236      ( 23 Aug 2018 07:23 )             38.7     08-23    144  |  108  |  12  ----------------------------<  95  3.7   |  26  |  0.79    Ca    8.5      23 Aug 2018 07:23  Phos  3.6     08-23  Mg     2.1     08-23    Serum Pro-Brain Natriuretic Peptide: 76 pg/mL (08-21-18 @ 18:45)      Procalcitonin, Serum: 0.04 ng/mL (08-23-18 @ 20:24)      RADIOLOGY & ADDITIONAL STUDIES:  CT Chest:  < from: CT Chest No Cont (08.22.18 @ 09:40) >    EXAM:  CT CHEST                            PROCEDURE DATE:  08/22/2018          INTERPRETATION:  CLINICAL INFORMATION: Cough; shortness of breath;   clinical concern for pneumonia    COMPARISON: CT scan of the abdomen and pelvis acquired 6/5/2018, chest   x-ray acquired 8/21/2018.    PROCEDURE:   CT of the Chest was performed without intravenous contrast.  Sagittal and coronal reformats were performed.    Maximum intensity projection images were generated.       FINDINGS:    LUNGS AND LARGE AIRWAYS: Mild amount of secretions within the trachea.   Patent central airways.  Mild diffuse bronchial wall thickening.    Mild upper lung predominant centrilobular emphysema.    Mild basilar predominant peripheral reticulations, concerning for   asbestosis. Patchy groundglass opacities predominantly within the   dependent portion of right lower lobe may represent atypical infection,   pneumonitis or manifestation of interstitial lung disease secondary to   asbestosis.    PLEURA: Extensive bilateral calcified pleural plaques, left greater than   the right and within the diaphragmatic surface of the left lower lung.    Small left pleural effusion, unchanged.    VESSELS: The descending aorta is mildly dilated measuring 4.2 cm. The   descending aorta and aortic arch are normal in diameter.    The main pulmonary arteries normal in diameter.    Multivessel coronary atherosclerosis. Mild aortic atherosclerosis.    HEART: Heart size is normal. No pericardial effusion. Atherosclerotic   changes of the LAD, and circumflex arteries.     MEDIASTINUM AND MARLENY: No lymphadenopathy.    CHEST WALL AND LOWER NECK: Approximately 1 cm right thyroid nodule.    VISUALIZED UPPER ABDOMEN: cholelithiasis.    BONES: Old fracture deformity of the left seventh rib. Mild fracture   deformity of T10 and L1 vertebral bodies.    IMPRESSION:     *  Extensive bilateral calcified pleural plaques, left greater than the   right likely secondary to prior asbestosis exposure.    *  Mild basilar predominant peripheral reticulations, concerning for   asbestosis. Recommend follow-up chest CT with prone and supine technique   for further evaluation.    *  Patchy groundglass opacities predominantly within the dependent   portion of right lower lobe may represent atypicalinfection, pneumonitis   or manifestation of interstitial lung disease secondary to asbestosis.   Recommend follow-up in 3-4 weeks with supine and prone CT imaging.    *  Coronary atherosclerosis.    *  Approximately 1 cm right thyroid nodule. Recommend further evaluation   with ultrasound.    *  Mild fracture deformity of T10 and L1 vertebral bodies.    < end of copied text >    CXR: < from: Xray Chest 1 View AP/PA (08.21.18 @ 19:25) >    EXAM:  XR CHEST AP OR PA 1V                            PROCEDURE DATE:  08/21/2018          INTERPRETATION:  CLINICAL INFORMATION: Shortness of breath, cough    EXAM: AP view of chest performed on 8/21/2018    COMPARISON: 7/29/2018.    FINDINGS:  The lungs are clear. Stable calcific pleural plaque along the upper left   chest wall. No pleural effusion or pneumothorax.  The cardiac silhouette is magnified on this projection.  The osseous structures are unremarkable.    IMPRESSION:  Clear lungs    PAST MEDICAL & SURGICAL HISTORY:  HLD (hyperlipidemia)  HTN (hypertension)  Parkinson's disease  Blindness of right eye  CVA (cerebral vascular accident)  History of laminectomy     Impression; 66 Y/O Male with prior mentioned multiple chronic conditions. Presented with SOB due to acute bronchitis? vs aspiration PNA,, however CT results may be due to previously tx aspiration PNA, been Afebrile. RVP negative. CT chest showed with ground glass patchy opacity with Small Left pleural Effusion. RVP, BLD Cx x 2 negative.    Suggestion;  Oxygen supplementation if needed. O2 saturation 96% room air.  Continue DuoNeb Q 6 hours.  Continue antibiotic.  DVT/ GI prophylactic.  Aspiration precautions.     Agree with above assessment and plan as transcribed.

## 2018-08-24 NOTE — DISCHARGE NOTE ADULT - HOSPITAL COURSE
68 y/o male from home with HHA PMH HTN, HLD, PD, CVA, blind right eye, dysphagia presented with SOB and cough per HHA.  Admitted 6/30 with aspiration pneumonia completed ABX.  Dysphasic diet recommended.  Also with black discoloration (2 cm x 0.3 cm) left great toe.  In the ED VS: HR 87 RR 16 /75 PO RA 99%.  CXR was clear. When CXR was viewed by Dr. Do there was suspicion of worsening of questionable opacity in right lower lobe, therefore Cefepime & Flagyl were continued and patient admitted to medicine.   CT chest w/o contrast requested for verification and results revealed:   *  Extensive bilateral calcified pleural plaques, left greater than the   right likely secondary to prior asbestosis exposure.  *  Mild basilar predominant peripheral reticulations, concerning for   asbestosis. Recommend follow-up chest CT with prone and supine technique   for further evaluation.  *  Patchy groundglass opacities predominantly within the dependent   portion of right lower lobe may represent atypicalinfection, pneumonitis   or manifestation of interstitial lung disease secondary to asbestosis.   Recommend follow-up in 3-4 weeks with supine and prone CT imaging.  *  Coronary atherosclerosis.  *  Approximately 1 cm right thyroid nodule. Recommend further evaluation   with ultrasound.  *  Old fracture deformity of the left seventh rib. Mild fracture   deformity of T10 and L1 vertebral bodies.    An US was obtain as recomended for right thyroid nodule.  US parathyrid  Right Lobe: 4.9 x 1.6 x 2.5 cm. 6 x 4 x 5 mm nodule and 4 x 3 x 4 mm   mildly complex small cysts.  Left Lobe: 4.8 x 1.5 x 1.7 cm. there are 2 simple cyst one measures 10 x   8 x 10 mm and the other 7 x 6 x 8 mm.  Isthmus: 8 mm.      Cervical Lymph Nodes: No enlarged or abnormal morphology cervical nodes.  IMPRESSION:   Numerous small simple and mildly complex colloid cysts throughout both   lobes. No solid nodules.    Dysphagia  Swallow evaluation completed recommending dysphagia honey thickened.    Unstageable wound left great toe (2cm x 0.3cm)   Wound care consulted  Local wound care given  Cleanse with NS then apply skin prep to the surrounding skin and cover with non-adherent dry dressing, for protection Daily and PRN  Elevate/float heels with protectors and reposition the patient Q 2hrs using wedges or pillow    Swallowing difficulty.  Plan: -Pt having recurrent episodes of aspiration pneumonia likely related to swallowing & speech difficulty d/t parkinson's  -Repeat speech & swallow eval  -Will keep pt npo until further recommendation  -Consult for Peg tube feeding   - Paliative consult.     Dr. Garber, endocrinologist   Goiter, nodular; euthyroid sub cm nodular goiter  No intervention at this time  Follow up ultrasound in 6 months    Parkinson's disease  Levodopa-carbidopa  four times a day  Memantine 5mg once daily.     HTN (hypertension)  Amlodipine 5mg daily     HLD   Lipid profile June was WNL    CVA (cerebral vascular accident).    Continued Aspirin & Plavix home dose 66 y/o male from home with HHA PMH HTN, HLD, PD, CVA, blind right eye, dysphagia presented with SOB and cough per HHA.  Admitted 6/30 with aspiration pneumonia completed ABX.  Dysphasic diet recommended.  Also with black discoloration (2 cm x 0.3 cm) left great toe.  In the ED VS: HR 87 RR 16 /75 PO RA 99%.  CXR was clear. When CXR was viewed by ONESIMO Light,  there was suspicion of worsening of questionable opacity in right lower lobe, therefore Cefepime & Flagyl were continued and patient admitted to medicine.   CT chest w/o contrast requested for verification and results revealed:   *  Extensive bilateral calcified pleural plaques, left greater than the   right likely secondary to prior asbestosis exposure.  *  Mild basilar predominant peripheral reticulations, concerning for   asbestosis. Recommend follow-up chest CT with prone and supine technique   for further evaluation.  *  Patchy groundglass opacities predominantly within the dependent   portion of right lower lobe may represent atypicalinfection, pneumonitis   or manifestation of interstitial lung disease secondary to asbestosis.   Recommend follow-up in 3-4 weeks with supine and prone CT imaging.  *  Coronary atherosclerosis.  *  Approximately 1 cm right thyroid nodule. Recommend further evaluation   with ultrasound.  *  Old fracture deformity of the left seventh rib. Mild fracture   deformity of T10 and L1 vertebral bodies.  BC's negative  Pt completed course of antibiotcis    An US was obtained as recomended for right thyroid nodule.  US parathyrid  Right Lobe: 4.9 x 1.6 x 2.5 cm. 6 x 4 x 5 mm nodule and 4 x 3 x 4 mm   mildly complex small cysts.  Left Lobe: 4.8 x 1.5 x 1.7 cm. there are 2 simple cyst one measures 10 x   8 x 10 mm and the other 7 x 6 x 8 mm.  Isthmus: 8 mm.      Cervical Lymph Nodes: No enlarged or abnormal morphology cervical nodes.  IMPRESSION:   Numerous small simple and mildly complex colloid cysts throughout both   lobes. No solid nodules.    Dysphagia  Swallow evaluation completed recommending dysphagia honey thickened.    Unstageable wound left great toe (2cm x 0.3cm)   Wound care consulted  Local wound care given  Cleanse with NS then apply skin prep to the surrounding skin and cover with non-adherent dry dressing, for protection Daily and PRN  Elevate/float heels with protectors and reposition the patient Q 2hrs using wedges or pillow    Swallowing difficulty.     -Pt having recurrent episodes of aspiration pneumonia likely related to swallowing & speech difficulty d/t parkinson's  -Repeat speech & swallow eval performed and recommended pureed with honey thickened fluids  -Dietary consulted    Dr. Garber, endocrinologist   Goiter, nodular; euthyroid sub cm nodular goiter  No intervention at this time  Follow up ultrasound in 6 months    Parkinson's disease  Levodopa-carbidopa  four times a day  Memantine 5mg once daily.   Neurology, consulted, and recommended followup with neurologist, in one week for  possible increase in Parkinson meds vs. starting on amphetamines     HTN (hypertension)  Amlodipine 5mg daily     HLD   Lipid profile June was WNL    CVA (cerebral vascular accident).    Continued Aspirin & Plavix home dose     PT consulted and recommended home with assitance  Attending cleared pt for discharge home with followup studies, as indicated above.  Antibiotics were completed.  Pt to followup with neurologist in one week. 68 y/o male from home with HHA PMH HTN, HLD, PD, CVA, blind right eye, dysphagia presented with SOB and cough per HHA.  Admitted 6/30 with aspiration pneumonia completed ABX.  Dysphasic diet recommended.  Also with black discoloration (2 cm x 0.3 cm) left great toe.  In the ED VS: HR 87 RR 16 /75 PO RA 99%.  CXR was clear. When CXR was viewed by Dr. Do, ID,  there was suspicion of worsening of questionable opacity in right lower lobe, therefore Cefepime & Flagyl were continued and patient admitted to medicine.   CT chest w/o contrast requested for verification and results revealed:   *  Extensive bilateral calcified pleural plaques, left greater than the   right likely secondary to prior asbestosis exposure.  *  Mild basilar predominant peripheral reticulations, concerning for   asbestosis. Recommend follow-up chest CT with prone and supine technique   for further evaluation.  *  Patchy groundglass opacities predominantly within the dependent   portion of right lower lobe may represent atypicalinfection, pneumonitis   or manifestation of interstitial lung disease secondary to asbestosis.   Recommend follow-up in 3-4 weeks with supine and prone CT imaging.  *  Coronary atherosclerosis.  *  Approximately 1 cm right thyroid nodule. Recommend further evaluation   with ultrasound.  *  Old fracture deformity of the left seventh rib. Mild fracture   deformity of T10 and L1 vertebral bodies.  BC's negative  Dr. Joe, pulmonary, consulted  Dr. Do/Dr. Shelton, ID, consulted  Pt completed course of antibiotcis    An US was obtained as recomended for right thyroid nodule.  US parathyrid  Right Lobe: 4.9 x 1.6 x 2.5 cm. 6 x 4 x 5 mm nodule and 4 x 3 x 4 mm   mildly complex small cysts.  Left Lobe: 4.8 x 1.5 x 1.7 cm. there are 2 simple cyst one measures 10 x   8 x 10 mm and the other 7 x 6 x 8 mm.  Isthmus: 8 mm.      Cervical Lymph Nodes: No enlarged or abnormal morphology cervical nodes.  IMPRESSION:   Numerous small simple and mildly complex colloid cysts throughout both   lobes. No solid nodules.  Dr. Garber, endo, consulted    Dysphagia  Swallow evaluation completed recommending dysphagia honey thickened.    Unstageable wound left great toe (2cm x 0.3cm)   Wound care consulted  Local wound care given  Cleanse with NS then apply skin prep to the surrounding skin and cover with non-adherent dry dressing, for protection Daily and PRN  Elevate/float heels with protectors and reposition the patient Q 2hrs using wedges or pillow    Swallowing difficulty.    Pt having recurrent episodes of aspiration pneumonia likely related to swallowing & speech difficulty d/t parkinson's  Repeat speech & swallow eval performed and recommended pureed with honey thickened fluids with strict aspiration precautions  Dietary consulted    Dr. Garber, endocrinologist   Goiter, nodular; euthyroid sub cm nodular goiter  No intervention at this time  Follow up ultrasound in 6 months    Parkinson's disease  Levodopa-carbidopa  four times a day  Memantine 5mg once daily.   Neurology, consulted, and recommended followup with neurologist, in one week for  possible increase in Parkinson meds vs. starting on amphetamines     HTN (hypertension)  Amlodipine 5mg daily     HLD   Lipid profile June was WNL    CVA (cerebral vascular accident).    Continued Aspirin & Plavix home dose     PT consulted and recommended home with assitance  Attending cleared pt for discharge home with followup studies, as indicated above.  Antibiotics were completed.  Pt to followup with neurologist in one week, endocrinologist, and pulmonologist.  Pt to followup with PCP in one week.

## 2018-08-24 NOTE — CONSULT NOTE ADULT - SUBJECTIVE AND OBJECTIVE BOX
Patient is a 67y old  Male who presents with a chief complaint of SOB & cough (22 Aug 2018 00:04)      HPI:  Pt is a 66 y/o wheel chair bound male with PMH of Parkinson's Disease, CVA, HTN , HLD who was brought to ED by his HHA for cough & SOB. As per ED provider note, HHA reports that pt had worsening sob & cough for one day for which he was brought to ED, there was no asociated chest pain or fever.  Pt was recently admitted ot [facility] on June 30, 2018 with similar complaints, Pt was worked up for pmeumonia and was found have aspiration pneumonia for which he was started on zosyn, levaquin, flagyl & maxipime which were later discontinued as he remained afebrile & cultures remained negative. Pt was evaluated by Speech and swallow who initially recommend npo with crushed meds with applesauce and later on with clinical improvement pureed & honey thick liquids by spoons as tolerated. I was unable to talk to home health aid, called the [facility] and received call back from Mr. Jennings agency representative([phone]) who confirmed medications and to call in morning to speak to pt's nurse and HHA. (22 Aug 2018 00:04). Admitted with asp PNA and found to have incidental goiter.       PAST MEDICAL & SURGICAL HISTORY:  HLD (hyperlipidemia)  HTN (hypertension)  Parkinson's disease  Blindness of right eye  CVA (cerebral vascular accident)  History of laminectomy         MEDICATIONS  (STANDING):  ALBUTerol/ipratropium for Nebulization 3 milliLiter(s) Nebulizer every 8 hours  amLODIPine   Tablet 5 milliGRAM(s) Oral daily  aspirin  chewable 81 milliGRAM(s) Oral daily  carbidopa/levodopa  25/100 1 Tablet(s) Oral <User Schedule>  carbidopa/levodopa  25/100 1.5 Tablet(s) Oral <User Schedule>  cefepime   IVPB      cefepime   IVPB 500 milliGRAM(s) IV Intermittent every 12 hours  clopidogrel Tablet 75 milliGRAM(s) Oral daily  dextrose 5% + sodium chloride 0.9%. 1000 milliLiter(s) (70 mL/Hr) IV Continuous <Continuous>  docusate sodium 100 milliGRAM(s) Oral two times a day  enoxaparin Injectable 40 milliGRAM(s) SubCutaneous daily  memantine 5 milliGRAM(s) Oral at bedtime  metroNIDAZOLE  IVPB 500 milliGRAM(s) IV Intermittent every 8 hours  pantoprazole    Tablet 40 milliGRAM(s) Oral before breakfast  senna 2 Tablet(s) Oral at bedtime    MEDICATIONS  (PRN):      FAMILY HISTORY:  No pertinent family history in first degree relatives      SOCIAL HISTORY:      REVIEW OF SYSTEMS:  CONSTITUTIONAL: No fever, weight loss, or fatigue  EYES: No eye pain, visual disturbances, or discharge  ENT:  No difficulty hearing, tinnitus, vertigo; No sinus or throat pain  NECK: No pain or stiffness  RESPIRATORY: No cough, wheezing, chills or hemoptysis; No Shortness of Breath  CARDIOVASCULAR: No chest pain, palpitations, passing out, dizziness, or leg swelling  GASTROINTESTINAL: No abdominal or epigastric pain. No nausea, vomiting, or hematemesis; No diarrhea or constipation. No melena or hematochezia.  GENITOURINARY: No dysuria, frequency, hematuria, or incontinence  NEUROLOGICAL: No headaches, memory loss, loss of strength, numbness, or tremors  SKIN: No itching, burning, rashes, or lesions   LYMPH Nodes: No enlarged glands  ENDOCRINE: No heat or cold intolerance; No hair loss  MUSCULOSKELETAL: No joint pain or swelling; No muscle, back, or extremity pain  PSYCHIATRIC: No depression, anxiety, mood swings, or difficulty sleeping  HEME/LYMPH: No easy bruising, or bleeding gums  ALLERGY AND IMMUNOLOGIC: No hives or eczema	        Vital Signs Last 24 Hrs  T(C): 36.7 (24 Aug 2018 06:10), Max: 36.8 (23 Aug 2018 20:30)  T(F): 98 (24 Aug 2018 06:10), Max: 98.3 (23 Aug 2018 20:30)  HR: 66 (24 Aug 2018 06:10) (66 - 82)  BP: 124/68 (24 Aug 2018 06:10) (117/67 - 125/66)  BP(mean): --  RR: 16 (24 Aug 2018 06:10) (16 - 16)  SpO2: 100% (24 Aug 2018 06:10) (98% - 100%)      Constitutional:    HEENT: nad    Neck:  No JVD, bruits or thyromegaly    Respiratory:  Clear without rales or rhonchi    Cardiovascular:  RR without murmur, rub or gallop.    Gastrointestinal: Soft without hepatosplenomegaly.    Extremities: without cyanosis, clubbing or edema.    Neurological:  Oriented   x non verbal     . No gross sensory or motor defects.        LABS:                        12.8   6.8   )-----------( 236      ( 23 Aug 2018 07:23 )             38.7     08-23    144  |  108  |  12  ----------------------------<  95  3.7   |  26  |  0.79    Ca    8.5      23 Aug 2018 07:23  Phos  3.6     08-23  Mg     2.1     08-23      Thyroid Stimulating Hormone, Serum: 0.97 uU/mL (07.01.18 @ 06:40)    < from: US Thyroid + Parathyroid (08.23.18 @ 10:34) >  EXAM:  US THYROID PARATHYROID                            PROCEDURE DATE:  08/23/2018          INTERPRETATION:  CLINICAL INFORMATION: Thyroid nodule on CT scan    COMPARISON: Chest CT 8/22/2018    TECHNIQUE: Sonography of the thyroid.     FINDINGS:    Right Lobe: 4.9 x 1.6 x 2.5 cm. 6 x 4 x 5 mm nodule and 4 x 3 x 4 mm   mildly complex small cysts.    Left Lobe: 4.8 x 1.5 x 1.7 cm. there are 2 simple cyst one measures 10 x   8 x 10 mm and the other 7 x 6 x 8 mm.    Isthmus: 8 mm.        CervicalLymph Nodes: No enlarged or abnormal morphology cervical nodes.    IMPRESSION:     Numerous small simple and mildly complex colloid cysts throughout both   lobes. No solid nodules.              < end of copied text >          CAPILLARY BLOOD GLUCOSE          RADIOLOGY & ADDITIONAL STUDIES:

## 2018-08-24 NOTE — DISCHARGE NOTE ADULT - CARE PLAN
Principal Discharge DX:	Aspiration pneumonia  Goal:	resolution  Assessment and plan of treatment:	You have completed antibiotics.  Followup with your PCP in one week.  Secondary Diagnosis:	Dysphasia  Assessment and plan of treatment:	A swallow eval was performed and recommendation was for Pureed diet with honey thickened fluids  Secondary Diagnosis:	HLD (hyperlipidemia)  Assessment and plan of treatment:	Continue with meds/DASH TLC diet  Secondary Diagnosis:	HTN (hypertension)  Assessment and plan of treatment:	Continue with meds/DASH diet  Secondary Diagnosis:	Parkinson's disease  Assessment and plan of treatment:	Followup with neurologist in one week for possible adjustment with medication  Secondary Diagnosis:	CVA (cerebral vascular accident)  Assessment and plan of treatment:	Continue with home meds Principal Discharge DX:	Aspiration pneumonia  Goal:	resolution  Assessment and plan of treatment:	You have completed antibiotics.  Followup with your PCP in one week.  Secondary Diagnosis:	Dysphasia  Assessment and plan of treatment:	A swallow eval was performed and recommendation was for Pureed diet with honey thickened fluids  FULL ASPIRATION PRECAUTIONS  Secondary Diagnosis:	HLD (hyperlipidemia)  Assessment and plan of treatment:	Continue with meds/DASH TLC diet  Secondary Diagnosis:	HTN (hypertension)  Assessment and plan of treatment:	Continue with meds/DASH diet  Secondary Diagnosis:	Parkinson's disease  Assessment and plan of treatment:	Followup with neurologist in one week for possible adjustment with medication  Secondary Diagnosis:	CVA (cerebral vascular accident)  Assessment and plan of treatment:	Continue with home meds

## 2018-08-24 NOTE — CONSULT NOTE ADULT - ASSESSMENT
Pt is a 66 y/o wheel chair bound male with PMH of Parkinson's Disease, CVA, HTN , HLD who was brought to ED by his HHA for cough & SOB. As per ED provider note, HHA reports that pt had worsening sob & cough for one day.  Admitted with asp PNA and found to have incidental goiter.

## 2018-08-24 NOTE — DISCHARGE NOTE ADULT - PATIENT PORTAL LINK FT
You can access the [facility] Patient Portal, offered by [facility], by registering with the following website: [email or web address]

## 2018-08-25 RX ORDER — PANTOPRAZOLE SODIUM 20 MG/1
40 TABLET, DELAYED RELEASE ORAL
Qty: 0 | Refills: 0 | Status: DISCONTINUED | OUTPATIENT
Start: 2018-08-25 | End: 2018-08-28

## 2018-08-25 RX ADMIN — Medication 3 MILLILITER(S): at 05:24

## 2018-08-25 RX ADMIN — ENOXAPARIN SODIUM 40 MILLIGRAM(S): 100 INJECTION SUBCUTANEOUS at 11:25

## 2018-08-25 RX ADMIN — SENNA PLUS 2 TABLET(S): 8.6 TABLET ORAL at 21:35

## 2018-08-25 RX ADMIN — Medication 81 MILLIGRAM(S): at 11:26

## 2018-08-25 RX ADMIN — CLOPIDOGREL BISULFATE 75 MILLIGRAM(S): 75 TABLET, FILM COATED ORAL at 11:25

## 2018-08-25 RX ADMIN — Medication 3 MILLILITER(S): at 21:01

## 2018-08-25 RX ADMIN — CARBIDOPA AND LEVODOPA 1.5 TABLET(S): 25; 100 TABLET ORAL at 06:17

## 2018-08-25 RX ADMIN — PANTOPRAZOLE SODIUM 40 MILLIGRAM(S): 20 TABLET, DELAYED RELEASE ORAL at 06:17

## 2018-08-25 RX ADMIN — Medication 3 MILLILITER(S): at 15:21

## 2018-08-25 RX ADMIN — CARBIDOPA AND LEVODOPA 1.5 TABLET(S): 25; 100 TABLET ORAL at 15:14

## 2018-08-25 RX ADMIN — AMLODIPINE BESYLATE 5 MILLIGRAM(S): 2.5 TABLET ORAL at 05:49

## 2018-08-25 RX ADMIN — Medication 100 MILLIGRAM(S): at 05:49

## 2018-08-25 RX ADMIN — CARBIDOPA AND LEVODOPA 1 TABLET(S): 25; 100 TABLET ORAL at 18:08

## 2018-08-25 RX ADMIN — Medication 100 MILLIGRAM(S): at 18:08

## 2018-08-25 RX ADMIN — CARBIDOPA AND LEVODOPA 1 TABLET(S): 25; 100 TABLET ORAL at 11:25

## 2018-08-25 RX ADMIN — MEMANTINE HYDROCHLORIDE 5 MILLIGRAM(S): 10 TABLET ORAL at 21:45

## 2018-08-25 NOTE — PROGRESS NOTE ADULT - SUBJECTIVE AND OBJECTIVE BOX
[facility]  [id number]  67[facility]        Patient is a 67y old  Male who presents with a chief complaint of SOB & cough (24 Aug 2018 14:36)      INTERVAL HPI/OVERNIGHT EVENTS:  Patient seen and examined at bedside. No notations of chest pain, palpitation, SOB, orthopnea, nausea, vomiting or abdominal pain.    ALLERGIES  No Known Allergies      MEDICATIONS  ALBUTerol/ipratropium for Nebulization 3 milliLiter(s) Nebulizer every 8 hours  amLODIPine   Tablet 5 milliGRAM(s) Oral daily  aspirin  chewable 81 milliGRAM(s) Oral daily  carbidopa/levodopa  25/100 1 Tablet(s) Oral <User Schedule>  carbidopa/levodopa  25/100 1.5 Tablet(s) Oral <User Schedule>  clopidogrel Tablet 75 milliGRAM(s) Oral daily  dextrose 5% + sodium chloride 0.9%. 1000 milliLiter(s) IV Continuous <Continuous>  docusate sodium 100 milliGRAM(s) Oral two times a day  enoxaparin Injectable 40 milliGRAM(s) SubCutaneous daily  memantine 5 milliGRAM(s) Oral at bedtime  pantoprazole   Suspension 40 milliGRAM(s) Oral before breakfast  senna 2 Tablet(s) Oral at bedtime              REVIEW OF SYSTEMS:  CONSTITUTIONAL: No fever, weight loss, or fatigue  EYES: No eye pain, visual disturbances, or discharge  ENT:  No difficulty hearing, tinnitus, vertigo; No sinus or throat pain  NECK: No pain or stiffness  RESPIRATORY: No cough, wheezing, chills or hemoptysis; No Shortness of Breath  CARDIOVASCULAR: No chest pain, palpitations, passing out, dizziness, or leg swelling  GASTROINTESTINAL: No abdominal or epigastric pain. No nausea, vomiting, or hematemesis; No diarrhea or constipation. No melena or hematochezia.  GENITOURINARY: No dysuria, frequency, hematuria, or incontinence  NEUROLOGICAL: No headaches, memory loss, loss of strength, numbness, or tremors  SKIN: No itching, burning, rashes, or lesions   LYMPH Nodes: No enlarged glands  ENDOCRINE: No heat or cold intolerance; No hair loss  MUSCULOSKELETAL: No joint pain or swelling; No muscle, back, or extremity pain  PSYCHIATRIC: No depression, anxiety, mood swings, or difficulty sleeping  HEME/LYMPH: No easy bruising, or bleeding gums  ALLERGY AND IMMUNOLOGIC: No hives or eczema	    [ ] All others negative	  [ ] Unable to obtain      T(C): 36.4 (08-25-18 @ 21:02), Max: 36.4 (08-25-18 @ 05:18)  T(F): 97.5 (08-25-18 @ 21:02), Max: 97.5 (08-25-18 @ 05:18)  HR: 86 (08-25-18 @ 21:02) (70 - 95)  BP: 139/74 (08-25-18 @ 21:02) (122/64 - 145/79)  RR: 15 (08-25-18 @ 21:02) (15 - 17)  SpO2: 98% (08-25-18 @ 21:02) (97% - 98%)  Wt(kg): --    I&O's Summary        PHYSICAL EXAM:  A X O x  HEAD:  Atraumatic, Normocephalic  EYES: EOMI, PERRLA, conjunctiva and sclera clear  NECK: Supple, No JVD, Normal thyroid  Resp: CTAB, No crackles, wheezing,   CVS: Regular rate and rhythm; No discernable murmurs, rubs, or gallops  ABD: Soft, Nontender, Nondistended; Bowel sounds present  EXTREMITIES:  2+ Peripheral Pulses, No edema  LYMPH: No dicernable lymphadenopathy noted  GENERAL: NAD, well-groomed, well-developed      LABS:              CAPILLARY BLOOD GLUCOSE          Troponins:  ProBNP:  Lipid Profile:   HgA1c:  TSH:           RADIOLOGY & ADDITIONAL TESTS:    Imaging Personally Reviewed:  [ ] YES  [ ] NO      Consultant(s) Notes Reviewed:  [x ] YES  [ ] NO    Care Discussed with Consultants/Other Providers [ x] YES  [ ] NO          PAST MEDICAL & SURGICAL HISTORY:  HLD (hyperlipidemia)  HTN (hypertension)  Parkinson's disease  Blindness of right eye  CVA (cerebral vascular accident)  History of laminectomy        Pneumonitis due to inhalation of food or vomitus  No pertinent family history in first degree relatives  Handoff  MEWS Score  HLD (hyperlipidemia)  HTN (hypertension)  Parkinson's disease  Blindness of right eye  CVA (cerebral vascular accident)  HLD (hyperlipidemia)  HTN (hypertension)  Parkinson's disease  Aspiration pneumonia  Aspiration pneumonia  Goiter, nodular  Goals of care, counseling/discussion  Swallowing difficulty  Prophylactic measure  CVA (cerebral vascular accident)  HLD (hyperlipidemia)  HTN (hypertension)  Parkinson's disease  SOB (shortness of breath)  History of laminectomy  A-DIFFICULTY SWOLLING  22  Dysphasia

## 2018-08-25 NOTE — PROGRESS NOTE ADULT - SUBJECTIVE AND OBJECTIVE BOX
Patient was seen and examined  Patient is a 67y old  Male who presents with a chief complaint of SOB & cough (24 Aug 2018 14:36)  NAD     INTERVAL HPI/OVERNIGHT EVENTS:  T(C): 36.4 (08-25-18 @ 21:02), Max: 36.4 (08-25-18 @ 05:18)  HR: 86 (08-25-18 @ 21:02) (70 - 95)  BP: 139/74 (08-25-18 @ 21:02) (122/64 - 145/79)  RR: 15 (08-25-18 @ 21:02) (15 - 17)  SpO2: 98% (08-25-18 @ 21:02) (97% - 98%)  Wt(kg): --  I&O's Summary      LABS:              CAPILLARY BLOOD GLUCOSE        LIPID PANEL  Cholesterol 102  LDL 39  HDL 53  RATIO HDL/Total Cholesterol --  Triglyceride 49            MEDICATIONS  (STANDING):  ALBUTerol/ipratropium for Nebulization 3 milliLiter(s) Nebulizer every 8 hours  amLODIPine   Tablet 5 milliGRAM(s) Oral daily  aspirin  chewable 81 milliGRAM(s) Oral daily  carbidopa/levodopa  25/100 1 Tablet(s) Oral <User Schedule>  carbidopa/levodopa  25/100 1.5 Tablet(s) Oral <User Schedule>  clopidogrel Tablet 75 milliGRAM(s) Oral daily  dextrose 5% + sodium chloride 0.9%. 1000 milliLiter(s) (70 mL/Hr) IV Continuous <Continuous>  docusate sodium 100 milliGRAM(s) Oral two times a day  enoxaparin Injectable 40 milliGRAM(s) SubCutaneous daily  memantine 5 milliGRAM(s) Oral at bedtime  pantoprazole   Suspension 40 milliGRAM(s) Oral before breakfast  senna 2 Tablet(s) Oral at bedtime    MEDICATIONS  (PRN):      RADIOLOGY & ADDITIONAL TESTS:    Imaging Personally Reviewed:  [ ] YES  [ ] NO    REVIEW OF SYSTEMS:  poor historian NAD       Consultant(s) Notes Reviewed:  [ x ] YES  [ ] NO    PHYSICAL EXAM:  GENERAL: NAD, well-groomed, well-developed  HEAD:  Atraumatic, Normocephalic  EYES: EOMI, PERRLA, conjunctiva and sclera clear  ENMT: No tonsillar erythema, exudates, or enlargement; Moist mucous membranes, Good dentition, No lesions  NECK: Supple, No JVD, Normal thyroid  NERVOUS SYSTEM: awake no asymetry   CHEST/LUNG:r sided rhonchi   HEART: Regular rate and rhythm; No murmurs, rubs, or gallops  ABDOMEN: Soft, Nontender, Nondistended; Bowel sounds present  EXTREMITIES:  2+ Peripheral Pulses, No clubbing, cyanosis, or edema  LYMPH: No lymphadenopathy noted  SKIN: No rashes or lesions    Care Discussed with Consultants/Other Providers [ x] YES  [ ] NO

## 2018-08-25 NOTE — PROGRESS NOTE ADULT - SUBJECTIVE AND OBJECTIVE BOX
Id attending covering for Dr. Do.  Chart is reviewed and patient is examined.     Subjective: lying in bed , awake , non verbal   OFF ABX  seems to be comfortable and not in any acute distress.      PHYSICAL EXAM:  Vital Signs Last 24 Hrs  T(C): 36.4 (25 Aug 2018 05:18), Max: 37.1 (24 Aug 2018 14:37)  T(F): 97.5 (25 Aug 2018 05:18), Max: 98.8 (24 Aug 2018 14:37)  HR: 70 (25 Aug 2018 05:18) (70 - 87)  BP: 122/64 (25 Aug 2018 05:18) (122/63 - 125/76)  BP(mean): --  RR: 16 (25 Aug 2018 05:18) (15 - 16)  SpO2: 98% (25 Aug 2018 05:18) (98% - 99%)      Constitutional: non verbal responds to his name    ENOCH SCLERA anicteric EOMI   LUNGS clear   CVS s1 s2 aud no murmurs   ABDOMEN soft non tender no HSM   NEUROLOGY  rigidity of xtremities    SKIN no rash   EXTREMITIES no edema no cyanosis       LABS/DIAGNOSTIC TESTS                        12.8   6.8   )-----------( 236      ( 23 Aug 2018 07:23 )             38.7     08-23    144  |  108  |  12  ----------------------------<  95  3.7   |  26  |  0.79    Ca    8.5      23 Aug 2018 07:23  Phos  3.6     08-23  Mg     2.1     08-23      Procalcitonin, Serum (08.23.18 @ 20:24)    Procalcitonin, Serum: 0.04: This assay is intended for use to determine the change of PCT over time  as an aid in assessing the cumulative 28-day risk of all-cause mortality  for patients diagnosed with severe sepsis or septic shock in the ICU, or  when obtained in the emergency department or other medical wards prior to  ICU admission. This assay was performed by the [employer] PCT  assay. ng/mL          MEDICATIONS  (STANDING):  ALBUTerol/ipratropium for Nebulization 3 milliLiter(s) Nebulizer every 8 hours  amLODIPine   Tablet 5 milliGRAM(s) Oral daily  aspirin  chewable 81 milliGRAM(s) Oral daily  carbidopa/levodopa  25/100 1 Tablet(s) Oral <User Schedule>  carbidopa/levodopa  25/100 1.5 Tablet(s) Oral <User Schedule>  clopidogrel Tablet 75 milliGRAM(s) Oral daily  dextrose 5% + sodium chloride 0.9%. 1000 milliLiter(s) (70 mL/Hr) IV Continuous <Continuous>  docusate sodium 100 milliGRAM(s) Oral two times a day  enoxaparin Injectable 40 milliGRAM(s) SubCutaneous daily  memantine 5 milliGRAM(s) Oral at bedtime  pantoprazole   Suspension 40 milliGRAM(s) Oral before breakfast  senna 2 Tablet(s) Oral at bedtime    MEDICATIONS  (PRN):          CULTURES  Culture Results: urine   <10,000 CFU/ml  Normal Urogenital kareem present (08-22 @ 09:15)  Culture Results: blood  No growth to date. (08-21 @ 23:40)  Culture Results: blood  No growth to date. (08-21 @ 23:40)        RADIOLOGY    < from: CT Chest No Cont (08.22.18 @ 09:40) >    EXAM:  CT CHEST                            PROCEDURE DATE:  08/22/2018          INTERPRETATION:  CLINICAL INFORMATION: Cough; shortness of breath;   clinical concern for pneumonia    COMPARISON: CT scan of the abdomen and pelvis acquired 6/5/2018, chest   x-ray acquired 8/21/2018.    PROCEDURE:   CT of the Chest was performed without intravenous contrast.  Sagittal and coronal reformats were performed.    Maximum intensity projection images were generated.       FINDINGS:    LUNGS AND LARGE AIRWAYS: Mild amount of secretions within the trachea.   Patent central airways.  Mild diffuse bronchial wall thickening.    Mild upper lung predominant centrilobular emphysema.    Mild basilar predominant peripheral reticulations, concerning for   asbestosis. Patchy groundglass opacities predominantly within the   dependent portion of right lower lobe may represent atypical infection,   pneumonitis or manifestation of interstitial lung disease secondary to   asbestosis.    PLEURA: Extensive bilateral calcified pleural plaques, left greater than   the right and within the diaphragmatic surface of the left lower lung.    Small left pleural effusion, unchanged.    VESSELS: The descending aorta is mildly dilated measuring 4.2 cm. The   descending aorta and aortic arch are normal in diameter.    The main pulmonary arteries normal in diameter.    Multivessel coronary atherosclerosis. Mild aortic atherosclerosis.    HEART: Heart size is normal. No pericardial effusion. Atherosclerotic   changes of the LAD, and circumflex arteries.     MEDIASTINUM AND MARLENY: No lymphadenopathy.    CHEST WALL AND LOWER NECK: Approximately 1 cm right thyroid nodule.    VISUALIZED UPPER ABDOMEN: cholelithiasis.    BONES: Old fracture deformity of the left seventh rib. Mild fracture   deformity of T10 and L1 vertebral bodies.    IMPRESSION:     *  Extensive bilateral calcified pleural plaques, left greater than the   right likely secondary to prior asbestosis exposure.    *  Mild basilar predominant peripheral reticulations, concerning for   asbestosis. Recommend follow-up chest CT with prone and supine technique   for further evaluation.    *  Patchy groundglass opacities predominantly within the dependent   portion of right lower lobe may represent atypicalinfection, pneumonitis   or manifestation of interstitial lung disease secondary to asbestosis.   Recommend follow-up in 3-4 weeks with supine and prone CT imaging.    *  Coronary atherosclerosis.    *  Approximately 1 cm right thyroid nodule. Recommend further evaluation   with ultrasound.    *  Mild fracture deformity of T10 and L1 vertebral bodies.

## 2018-08-25 NOTE — CHART NOTE - NSCHARTNOTEFT_GEN_A_CORE
Paged by RN regarding chocking event during feeding. Nurse was c/f possible aspiration of food. Patient on honey-consistency dysphasia diet.   Presented immediately to pt's room but pt was not choking, coughing, gasping, or having respiratory distress any more than of his baseline.   Lungs were clear to auscultation bilaterally without wheezes or rales, diminished lung sounds noticed bilaterally, possibly 2/2 poor inspiratory efforts.

## 2018-08-26 RX ADMIN — ENOXAPARIN SODIUM 40 MILLIGRAM(S): 100 INJECTION SUBCUTANEOUS at 12:06

## 2018-08-26 RX ADMIN — Medication 3 MILLILITER(S): at 14:17

## 2018-08-26 RX ADMIN — CARBIDOPA AND LEVODOPA 1 TABLET(S): 25; 100 TABLET ORAL at 12:06

## 2018-08-26 RX ADMIN — Medication 81 MILLIGRAM(S): at 12:06

## 2018-08-26 RX ADMIN — Medication 100 MILLIGRAM(S): at 06:18

## 2018-08-26 RX ADMIN — PANTOPRAZOLE SODIUM 40 MILLIGRAM(S): 20 TABLET, DELAYED RELEASE ORAL at 06:17

## 2018-08-26 RX ADMIN — Medication 3 MILLILITER(S): at 20:22

## 2018-08-26 RX ADMIN — Medication 3 MILLILITER(S): at 06:16

## 2018-08-26 RX ADMIN — CARBIDOPA AND LEVODOPA 1 TABLET(S): 25; 100 TABLET ORAL at 18:20

## 2018-08-26 RX ADMIN — Medication 100 MILLIGRAM(S): at 17:02

## 2018-08-26 RX ADMIN — MEMANTINE HYDROCHLORIDE 5 MILLIGRAM(S): 10 TABLET ORAL at 22:30

## 2018-08-26 RX ADMIN — CARBIDOPA AND LEVODOPA 1.5 TABLET(S): 25; 100 TABLET ORAL at 06:17

## 2018-08-26 RX ADMIN — CLOPIDOGREL BISULFATE 75 MILLIGRAM(S): 75 TABLET, FILM COATED ORAL at 12:06

## 2018-08-26 RX ADMIN — AMLODIPINE BESYLATE 5 MILLIGRAM(S): 2.5 TABLET ORAL at 06:17

## 2018-08-26 RX ADMIN — CARBIDOPA AND LEVODOPA 1.5 TABLET(S): 25; 100 TABLET ORAL at 16:55

## 2018-08-26 RX ADMIN — SENNA PLUS 2 TABLET(S): 8.6 TABLET ORAL at 22:30

## 2018-08-26 NOTE — PHYSICAL THERAPY INITIAL EVALUATION ADULT - CRITERIA FOR SKILLED THERAPEUTIC INTERVENTIONS
patient has no skilled PT needs as patient is currently functioning at his baseline, wheelchair bound and needs assistance in all ADLs/functional limitations in following categories

## 2018-08-26 NOTE — PROGRESS NOTE ADULT - SUBJECTIVE AND OBJECTIVE BOX
Patient was seen and examined  Patient is a 67y old  Male who presents with a chief complaint of SOB & cough (24 Aug 2018 14:36)  NAD NON VERBAL       INTERVAL HPI/OVERNIGHT EVENTS:  T(C): 37.1 (08-26-18 @ 14:34), Max: 37.1 (08-26-18 @ 14:34)  HR: 96 (08-26-18 @ 14:34) (76 - 96)  BP: 132/75 (08-26-18 @ 14:34) (129/65 - 139/74)  RR: 16 (08-26-18 @ 14:34) (15 - 16)  SpO2: 100% (08-26-18 @ 14:34) (97% - 100%)  Wt(kg): --  I&O's Summary      LABS:              CAPILLARY BLOOD GLUCOSE                  MEDICATIONS  (STANDING):  ALBUTerol/ipratropium for Nebulization 3 milliLiter(s) Nebulizer every 8 hours  amLODIPine   Tablet 5 milliGRAM(s) Oral daily  aspirin  chewable 81 milliGRAM(s) Oral daily  carbidopa/levodopa  25/100 1 Tablet(s) Oral <User Schedule>  carbidopa/levodopa  25/100 1.5 Tablet(s) Oral <User Schedule>  clopidogrel Tablet 75 milliGRAM(s) Oral daily  dextrose 5% + sodium chloride 0.9%. 1000 milliLiter(s) (70 mL/Hr) IV Continuous <Continuous>  docusate sodium 100 milliGRAM(s) Oral two times a day  enoxaparin Injectable 40 milliGRAM(s) SubCutaneous daily  memantine 5 milliGRAM(s) Oral at bedtime  pantoprazole   Suspension 40 milliGRAM(s) Oral before breakfast  senna 2 Tablet(s) Oral at bedtime    MEDICATIONS  (PRN):      RADIOLOGY & ADDITIONAL TESTS:    Imaging Personally Reviewed:  [ ] YES  [ ] NO    REVIEW OF SYSTEMS:  unable to provide         Consultant(s) Notes Reviewed:  [ ] YES  [ ] NO    PHYSICAL EXAM:  GENERAL: NAD, well-groomed, well-developed  HEAD:  Atraumatic, Normocephalic  EYES: EOMI, PERRLA, conjunctiva and sclera clear  ENMT: No tonsillar erythema, exudates, or enlargement; Moist mucous membranes, Good dentition, No lesions  NECK: Supple, No JVawake unable to provide hx   CHEST/LUNG: l sided rhonchi   HEART: Regular rate and rhythm; No murmurs, rubs, or gallops  ABDOMEN: Soft, Nontender, Nondistended; Bowel sounds present  EXTREMITIES:  2+ Peripheral Pulses, No clubbing, cyanosis, or edema  LYMPH: No lymphadenopathy noted  SKIN: No rashes or lesions    Care Discussed with Consultants/Other Providers [ x] YES  [ ] NO

## 2018-08-26 NOTE — PROGRESS NOTE ADULT - SUBJECTIVE AND OBJECTIVE BOX
Id attending covering for Dr. Do.  Chart is reviewed and patient is examined.     Subjective: lying in bed , awake , non verbal   OFF ABX  seems to be comfortable and not in any acute distress.      PHYSICAL EXAM:  Vital Signs Last 24 Hrs  T(C): 36.4 (25 Aug 2018 21:02), Max: 36.4 (25 Aug 2018 05:18)  T(F): 97.5 (25 Aug 2018 21:02), Max: 97.5 (25 Aug 2018 05:18)  HR: 86 (25 Aug 2018 21:02) (70 - 95)  BP: 139/74 (25 Aug 2018 21:02) (122/64 - 145/79)  BP(mean): 89 (25 Aug 2018 21:02) (89 - 96)  RR: 15 (25 Aug 2018 21:02) (15 - 17)  SpO2: 98% (25 Aug 2018 21:02) (97% - 98%)    Constitutional: non verbal responds to his name.    ENOCH SCLERA anicteric EOMI.   LUNGS clear.   CVS s1 s2 aud no murmurs.   ABDOMEN soft non tender no HSM.   NEUROLOGY  rigidity of extremities.    SKIN no rash.   EXTREMITIES no edema no cyanosis.      LABS/DIAGNOSTIC TESTS                        12.8   6.8   )-----------( 236      ( 23 Aug 2018 07:23 )             38.7     08-23    144  |  108  |  12  ----------------------------<  95  3.7   |  26  |  0.79    Ca    8.5      23 Aug 2018 07:23  Phos  3.6     08-23  Mg     2.1     08-23      Procalcitonin, Serum (08.23.18 @ 20:24)    Procalcitonin, Serum: 0.04: This assay is intended for use to determine the change of PCT over time  as an aid in assessing the cumulative 28-day risk of all-cause mortality  for patients diagnosed with severe sepsis or septic shock in the ICU, or  when obtained in the emergency department or other medical wards prior to  ICU admission. This assay was performed by the [employer] PCT  assay. ng/mL          MEDICATIONS  (STANDING):  ALBUTerol/ipratropium for Nebulization 3 milliLiter(s) Nebulizer every 8 hours  amLODIPine   Tablet 5 milliGRAM(s) Oral daily  aspirin  chewable 81 milliGRAM(s) Oral daily  carbidopa/levodopa  25/100 1 Tablet(s) Oral <User Schedule>  carbidopa/levodopa  25/100 1.5 Tablet(s) Oral <User Schedule>  clopidogrel Tablet 75 milliGRAM(s) Oral daily  dextrose 5% + sodium chloride 0.9%. 1000 milliLiter(s) (70 mL/Hr) IV Continuous <Continuous>  docusate sodium 100 milliGRAM(s) Oral two times a day  enoxaparin Injectable 40 milliGRAM(s) SubCutaneous daily  memantine 5 milliGRAM(s) Oral at bedtime  pantoprazole   Suspension 40 milliGRAM(s) Oral before breakfast  senna 2 Tablet(s) Oral at bedtime    MEDICATIONS  (PRN):          CULTURES  Culture Results: urine   <10,000 CFU/ml  Normal Urogenital kareem present (08-22 @ 09:15)  Culture Results: blood  No growth to date. (08-21 @ 23:40)  Culture Results: blood  No growth to date. (08-21 @ 23:40)        RADIOLOGY    < from: CT Chest No Cont (08.22.18 @ 09:40) >    EXAM:  CT CHEST                            PROCEDURE DATE:  08/22/2018          INTERPRETATION:  CLINICAL INFORMATION: Cough; shortness of breath;   clinical concern for pneumonia    COMPARISON: CT scan of the abdomen and pelvis acquired 6/5/2018, chest   x-ray acquired 8/21/2018.    PROCEDURE:   CT of the Chest was performed without intravenous contrast.  Sagittal and coronal reformats were performed.    Maximum intensity projection images were generated.       FINDINGS:    LUNGS AND LARGE AIRWAYS: Mild amount of secretions within the trachea.   Patent central airways.  Mild diffuse bronchial wall thickening.    Mild upper lung predominant centrilobular emphysema.    Mild basilar predominant peripheral reticulations, concerning for   asbestosis. Patchy groundglass opacities predominantly within the   dependent portion of right lower lobe may represent atypical infection,   pneumonitis or manifestation of interstitial lung disease secondary to   asbestosis.    PLEURA: Extensive bilateral calcified pleural plaques, left greater than   the right and within the diaphragmatic surface of the left lower lung.    Small left pleural effusion, unchanged.    VESSELS: The descending aorta is mildly dilated measuring 4.2 cm. The   descending aorta and aortic arch are normal in diameter.    The main pulmonary arteries normal in diameter.    Multivessel coronary atherosclerosis. Mild aortic atherosclerosis.    HEART: Heart size is normal. No pericardial effusion. Atherosclerotic   changes of the LAD, and circumflex arteries.     MEDIASTINUM AND MARLENY: No lymphadenopathy.    CHEST WALL AND LOWER NECK: Approximately 1 cm right thyroid nodule.    VISUALIZED UPPER ABDOMEN: cholelithiasis.    BONES: Old fracture deformity of the left seventh rib. Mild fracture   deformity of T10 and L1 vertebral bodies.    IMPRESSION:     *  Extensive bilateral calcified pleural plaques, left greater than the   right likely secondary to prior asbestosis exposure.    *  Mild basilar predominant peripheral reticulations, concerning for   asbestosis. Recommend follow-up chest CT with prone and supine technique   for further evaluation.    *  Patchy groundglass opacities predominantly within the dependent   portion of right lower lobe may represent atypicalinfection, pneumonitis   or manifestation of interstitial lung disease secondary to asbestosis.   Recommend follow-up in 3-4 weeks with supine and prone CT imaging.    *  Coronary atherosclerosis.    *  Approximately 1 cm right thyroid nodule. Recommend further evaluation   with ultrasound.    *  Mild fracture deformity of T10 and L1 vertebral bodies.

## 2018-08-27 LAB
ANION GAP SERPL CALC-SCNC: 8 MMOL/L — SIGNIFICANT CHANGE UP (ref 5–17)
BUN SERPL-MCNC: 22 MG/DL — HIGH (ref 7–18)
CALCIUM SERPL-MCNC: 9 MG/DL — SIGNIFICANT CHANGE UP (ref 8.4–10.5)
CHLORIDE SERPL-SCNC: 110 MMOL/L — HIGH (ref 96–108)
CO2 SERPL-SCNC: 28 MMOL/L — SIGNIFICANT CHANGE UP (ref 22–31)
CREAT SERPL-MCNC: 0.71 MG/DL — SIGNIFICANT CHANGE UP (ref 0.5–1.3)
CULTURE RESULTS: SIGNIFICANT CHANGE UP
CULTURE RESULTS: SIGNIFICANT CHANGE UP
GLUCOSE SERPL-MCNC: 90 MG/DL — SIGNIFICANT CHANGE UP (ref 70–99)
HCT VFR BLD CALC: 39 % — SIGNIFICANT CHANGE UP (ref 39–50)
HGB BLD-MCNC: 12.5 G/DL — LOW (ref 13–17)
MCHC RBC-ENTMCNC: 29.6 PG — SIGNIFICANT CHANGE UP (ref 27–34)
MCHC RBC-ENTMCNC: 32.1 GM/DL — SIGNIFICANT CHANGE UP (ref 32–36)
MCV RBC AUTO: 92.2 FL — SIGNIFICANT CHANGE UP (ref 80–100)
PLATELET # BLD AUTO: 223 K/UL — SIGNIFICANT CHANGE UP (ref 150–400)
POTASSIUM SERPL-MCNC: 3.7 MMOL/L — SIGNIFICANT CHANGE UP (ref 3.5–5.3)
POTASSIUM SERPL-SCNC: 3.7 MMOL/L — SIGNIFICANT CHANGE UP (ref 3.5–5.3)
RBC # BLD: 4.23 M/UL — SIGNIFICANT CHANGE UP (ref 4.2–5.8)
RBC # FLD: 13.5 % — SIGNIFICANT CHANGE UP (ref 10.3–14.5)
SODIUM SERPL-SCNC: 146 MMOL/L — HIGH (ref 135–145)
SPECIMEN SOURCE: SIGNIFICANT CHANGE UP
SPECIMEN SOURCE: SIGNIFICANT CHANGE UP
WBC # BLD: 6.6 K/UL — SIGNIFICANT CHANGE UP (ref 3.8–10.5)
WBC # FLD AUTO: 6.6 K/UL — SIGNIFICANT CHANGE UP (ref 3.8–10.5)

## 2018-08-27 RX ADMIN — ENOXAPARIN SODIUM 40 MILLIGRAM(S): 100 INJECTION SUBCUTANEOUS at 11:07

## 2018-08-27 RX ADMIN — CARBIDOPA AND LEVODOPA 1.5 TABLET(S): 25; 100 TABLET ORAL at 06:27

## 2018-08-27 RX ADMIN — CARBIDOPA AND LEVODOPA 1.5 TABLET(S): 25; 100 TABLET ORAL at 17:10

## 2018-08-27 RX ADMIN — CARBIDOPA AND LEVODOPA 1 TABLET(S): 25; 100 TABLET ORAL at 11:08

## 2018-08-27 RX ADMIN — PANTOPRAZOLE SODIUM 40 MILLIGRAM(S): 20 TABLET, DELAYED RELEASE ORAL at 06:27

## 2018-08-27 RX ADMIN — Medication 100 MILLIGRAM(S): at 17:11

## 2018-08-27 RX ADMIN — Medication 81 MILLIGRAM(S): at 11:08

## 2018-08-27 RX ADMIN — Medication 3 MILLILITER(S): at 06:03

## 2018-08-27 RX ADMIN — Medication 100 MILLIGRAM(S): at 05:40

## 2018-08-27 RX ADMIN — CLOPIDOGREL BISULFATE 75 MILLIGRAM(S): 75 TABLET, FILM COATED ORAL at 11:07

## 2018-08-27 RX ADMIN — Medication 3 MILLILITER(S): at 21:00

## 2018-08-27 RX ADMIN — Medication 3 MILLILITER(S): at 14:13

## 2018-08-27 RX ADMIN — SENNA PLUS 2 TABLET(S): 8.6 TABLET ORAL at 21:35

## 2018-08-27 RX ADMIN — MEMANTINE HYDROCHLORIDE 5 MILLIGRAM(S): 10 TABLET ORAL at 21:35

## 2018-08-27 RX ADMIN — CARBIDOPA AND LEVODOPA 1 TABLET(S): 25; 100 TABLET ORAL at 18:34

## 2018-08-27 RX ADMIN — AMLODIPINE BESYLATE 5 MILLIGRAM(S): 2.5 TABLET ORAL at 05:40

## 2018-08-27 NOTE — DIETITIAN INITIAL EVALUATION ADULT. - OTHER INFO
Pt visited. Pt is alert but non verbal. Pt seen for LOS= 7 days. D/W RN pt is eating well. S/P SLP Pureed Honey liquids by  spoon... Pt  appears thin and debilitated. Per RN pt can feed self.

## 2018-08-27 NOTE — PROGRESS NOTE ADULT - PROBLEM SELECTOR PROBLEM 4
Parkinson's disease

## 2018-08-27 NOTE — DIETITIAN INITIAL EVALUATION ADULT. - PROBLEM SELECTOR PLAN 1
Sob & associated cough  Pt afebrile & normal wbc count  PE with crackles on left side  No leg swelling, tachycardia or tachypnoea    Plan:  Stat doses of levaquin & zosyn given in ed  F/u Ct chest  f/u cbc in am  f/u blood & urine cultures & rvp  Pt is NPO till further recommendation  Pt x-ray chest showed worsening of questionable opacity in right lower lobe  -Starting pt on cefepime & flagyl as per Dr. Do for suspicion of aspiration pneumonia

## 2018-08-27 NOTE — CHART NOTE - NSCHARTNOTEFT_GEN_A_CORE
Pt's discharge completed.  To be discharged at 12 noon with HHA picking up.  CM had to give 24 hour notice.

## 2018-08-27 NOTE — DIETITIAN INITIAL EVALUATION ADULT. - PROBLEM SELECTOR PLAN 4
Confirmed home medications from [facility] care [place]  -Will start pt on home medications   -Levodopa-carbidopa  four times a day  -Memantine 5mg once daily

## 2018-08-27 NOTE — PROGRESS NOTE ADULT - PROBLEM SELECTOR PLAN 5
Will monitor blood pressure and continue amlodipine 5mg daily with parameters

## 2018-08-27 NOTE — DIETITIAN INITIAL EVALUATION ADULT. - PROBLEM SELECTOR PLAN 2
-Pt having recurrent episodes of aspiration pneumonia likely related to swallowing & speech difficulty d/t parkinson's  -Repeat speech & swallow eval  -Will keep pt npo until further recommendation  -Consult for Peg tube feeding   - Paliative consult

## 2018-08-27 NOTE — PROGRESS NOTE ADULT - PROBLEM SELECTOR PROBLEM 2
Swallowing difficulty

## 2018-08-27 NOTE — PROGRESS NOTE ADULT - PROBLEM SELECTOR PROBLEM 1
SOB (shortness of breath)

## 2018-08-27 NOTE — PROGRESS NOTE ADULT - SUBJECTIVE AND OBJECTIVE BOX
[id number]  67[facility]        Patient is a 67y old  Male who presents with a chief complaint of SOB & cough (24 Aug 2018 14:36)      INTERVAL HPI/OVERNIGHT EVENTS:  Patient seen and examined at bedside. D/C plans for today postponed, awaiting HHA resumption. No notations of chest pain, palpitation, SOB, orthopnea, nausea, vomiting or abdominal pain.    ALLERGIES  No Known Allergies      MEDICATIONS  ALBUTerol/ipratropium for Nebulization 3 milliLiter(s) Nebulizer every 8 hours  amLODIPine   Tablet 5 milliGRAM(s) Oral daily  aspirin  chewable 81 milliGRAM(s) Oral daily  carbidopa/levodopa  25/100 1 Tablet(s) Oral <User Schedule>  carbidopa/levodopa  25/100 1.5 Tablet(s) Oral <User Schedule>  clopidogrel Tablet 75 milliGRAM(s) Oral daily  dextrose 5% + sodium chloride 0.9%. 1000 milliLiter(s) IV Continuous <Continuous>  docusate sodium 100 milliGRAM(s) Oral two times a day  enoxaparin Injectable 40 milliGRAM(s) SubCutaneous daily  memantine 5 milliGRAM(s) Oral at bedtime  pantoprazole   Suspension 40 milliGRAM(s) Oral before breakfast  senna 2 Tablet(s) Oral at bedtime              REVIEW OF SYSTEMS:  CONSTITUTIONAL: No fever, weight loss, or fatigue  EYES: No eye pain, visual disturbances, or discharge  ENT:  No difficulty hearing, tinnitus, vertigo; No sinus or throat pain  NECK: No pain or stiffness  RESPIRATORY: No cough, wheezing, chills or hemoptysis; No Shortness of Breath  CARDIOVASCULAR: No chest pain, palpitations, passing out, dizziness, or leg swelling  GASTROINTESTINAL: No abdominal or epigastric pain. No nausea, vomiting, or hematemesis; No diarrhea or constipation. No melena or hematochezia.  GENITOURINARY: No dysuria, frequency, hematuria, or incontinence  NEUROLOGICAL: No headaches, memory loss, loss of strength, numbness, or tremors  SKIN: No itching, burning, rashes, or lesions   LYMPH Nodes: No enlarged glands  ENDOCRINE: No heat or cold intolerance; No hair loss  MUSCULOSKELETAL: No joint pain or swelling; No muscle, back, or extremity pain  PSYCHIATRIC: No depression, anxiety, mood swings, or difficulty sleeping  HEME/LYMPH: No easy bruising, or bleeding gums  ALLERGY AND IMMUNOLOGIC: No hives or eczema	    [ ] All others negative	  [ ] Unable to obtain      T(C): 36.6 (08-27-18 @ 14:03), Max: 36.6 (08-27-18 @ 14:03)  T(F): 97.9 (08-27-18 @ 14:03), Max: 97.9 (08-27-18 @ 14:03)  HR: 89 (08-27-18 @ 14:03) (69 - 89)  BP: 115/67 (08-27-18 @ 14:03) (115/67 - 130/71)  RR: 18 (08-27-18 @ 14:03) (16 - 18)  SpO2: 95% (08-27-18 @ 14:03) (95% - 99%)  Wt(kg): --    I&O's Summary        PHYSICAL EXAM:  A X O x  HEAD:  Atraumatic, Normocephalic  EYES: EOMI, PERRLA, conjunctiva and sclera clear  NECK: Supple, No JVD, Normal thyroid  Resp: CTAB, No crackles, wheezing,   CVS: Regular rate and rhythm; No discernable murmurs, rubs, or gallops  ABD: Soft, Nontender, Nondistended; Bowel sounds present  EXTREMITIES:  2+ Peripheral Pulses, No edema  LYMPH: No dicernable lymphadenopathy noted  GENERAL: NAD, well-groomed, well-developed      LABS:                        12.5   6.6   )-----------( 223      ( 27 Aug 2018 06:33 )             39.0     08-27    146<H>  |  110<H>  |  22<H>  ----------------------------<  90  3.7   |  28  |  0.71    Ca    9.0      27 Aug 2018 06:33          CAPILLARY BLOOD GLUCOSE          Troponins:  ProBNP:  Lipid Profile:   HgA1c:  TSH:           RADIOLOGY & ADDITIONAL TESTS:    Imaging Personally Reviewed:  [ ] YES  [ ] NO      Consultant(s) Notes Reviewed:  [x ] YES  [ ] NO    Care Discussed with Consultants/Other Providers [ x] YES  [ ] NO          PAST MEDICAL & SURGICAL HISTORY:  HLD (hyperlipidemia)  HTN (hypertension)  Parkinson's disease  Blindness of right eye  CVA (cerebral vascular accident)  History of laminectomy        Pneumonitis due to inhalation of food or vomitus  No pertinent family history in first degree relatives  Handoff  MEWS Score  HLD (hyperlipidemia)  HTN (hypertension)  Parkinson's disease  Blindness of right eye  CVA (cerebral vascular accident)  HLD (hyperlipidemia)  HTN (hypertension)  Parkinson's disease  Aspiration pneumonia  Aspiration pneumonia  Aspiration pneumonia  Goiter, nodular  Goals of care, counseling/discussion  Swallowing difficulty  Prophylactic measure  CVA (cerebral vascular accident)  HLD (hyperlipidemia)  HTN (hypertension)  Parkinson's disease  SOB (shortness of breath)  History of laminectomy  A-DIFFICULTY SWOLLING  22  CVA (cerebral vascular accident)  Parkinson's disease  HTN (hypertension)  HLD (hyperlipidemia)  Dysphasia

## 2018-08-27 NOTE — PROGRESS NOTE ADULT - PROBLEM SELECTOR PLAN 7
Will continue with aspirin & Plavix home dose

## 2018-08-27 NOTE — PROGRESS NOTE ADULT - PROBLEM SELECTOR PLAN 6
Pt has h/o hyperlipidemia not on any medications   -F/u lipid profile in am  -Last lipid profile done in June was [facility]

## 2018-08-27 NOTE — PROGRESS NOTE ADULT - PROBLEM SELECTOR PLAN 3
-Social work consult
-Social work consult
Unable to reach pt family & to discuss goals of care  -Social work consult
-Social work consult

## 2018-08-27 NOTE — PROGRESS NOTE ADULT - SUBJECTIVE AND OBJECTIVE BOX
Patient seen and examined.     MEDICATIONS  (STANDING):  ALBUTerol/ipratropium for Nebulization 3 milliLiter(s) Nebulizer every 8 hours  amLODIPine   Tablet 5 milliGRAM(s) Oral daily  aspirin  chewable 81 milliGRAM(s) Oral daily  carbidopa/levodopa  25/100 1 Tablet(s) Oral <User Schedule>  carbidopa/levodopa  25/100 1.5 Tablet(s) Oral <User Schedule>  clopidogrel Tablet 75 milliGRAM(s) Oral daily  dextrose 5% + sodium chloride 0.9%. 1000 milliLiter(s) (70 mL/Hr) IV Continuous <Continuous>  docusate sodium 100 milliGRAM(s) Oral two times a day  enoxaparin Injectable 40 milliGRAM(s) SubCutaneous daily  memantine 5 milliGRAM(s) Oral at bedtime  pantoprazole   Suspension 40 milliGRAM(s) Oral before breakfast  senna 2 Tablet(s) Oral at bedtime      MEDICATIONS  (PRN):     Medications up to date at time of exam.    PHYSICAL EXAMINATION:  Patient has no new complaints.  GENERAL: The patient is a well-developed, well-nourished, in no apparent distress.     Vital Signs Last 24 Hrs  T(C): 36.6 (27 Aug 2018 14:03), Max: 36.6 (27 Aug 2018 14:03)  T(F): 97.9 (27 Aug 2018 14:03), Max: 97.9 (27 Aug 2018 14:03)  HR: 89 (27 Aug 2018 14:03) (69 - 89)  BP: 115/67 (27 Aug 2018 14:03) (115/67 - 130/71)  BP(mean): --  RR: 18 (27 Aug 2018 14:03) (16 - 18)  SpO2: 95% (27 Aug 2018 14:03) (95% - 99%)   (if applicable)    Chest Tube (if applicable)    HEENT: Head is normocephalic and atraumatic.     NECK: Supple, no palpable adenopathy.    LUNGS: Clear to auscultation, no wheezing, rales, or rhonchi.    HEART: Regular rate and rhythm without murmur.    ABDOMEN: Soft, nontender, and nondistended.      EXTREMITIES: Without any cyanosis, clubbing, rash, lesions or edema.    NEUROLOGIC: Awake, alert.    SKIN: Warm, dry, good turgor.    LABS:                        12.5   6.6   )-----------( 223      ( 27 Aug 2018 06:33 )             39.0     08-27    146<H>  |  110<H>  |  22<H>  ----------------------------<  90  3.7   |  28  |  0.71    Ca    9.0      27 Aug 2018 06:33      MICROBIOLOGY: (if applicable)    RADIOLOGY & ADDITIONAL STUDIES:  EKG:   CXR:  ECHO:    IMPRESSION: 67y Male PAST MEDICAL & SURGICAL HISTORY:  HLD (hyperlipidemia)  HTN (hypertension)  Parkinson's disease  Blindness of right eye  CVA (cerebral vascular accident)  History of laminectomy       Patient presents from home with SOB as per AG Gann.     +SOB due to acute bronchitis? vs aspiration PNA,, however CT results may be due to previously tx aspiration PNA, no leukocytosis, no fevers    RVP negative  +atelectasis    SUGGESTION:   - con't with bronchodilators, o2 supplement as needed.    - patient s/p abx   - aspiration precautions   - DVT and GI prophylaxis. Patient seen and examined.     MEDICATIONS  (STANDING):  ALBUTerol/ipratropium for Nebulization 3 milliLiter(s) Nebulizer every 8 hours  amLODIPine   Tablet 5 milliGRAM(s) Oral daily  aspirin  chewable 81 milliGRAM(s) Oral daily  carbidopa/levodopa  25/100 1 Tablet(s) Oral <User Schedule>  carbidopa/levodopa  25/100 1.5 Tablet(s) Oral <User Schedule>  clopidogrel Tablet 75 milliGRAM(s) Oral daily  dextrose 5% + sodium chloride 0.9%. 1000 milliLiter(s) (70 mL/Hr) IV Continuous <Continuous>  docusate sodium 100 milliGRAM(s) Oral two times a day  enoxaparin Injectable 40 milliGRAM(s) SubCutaneous daily  memantine 5 milliGRAM(s) Oral at bedtime  pantoprazole   Suspension 40 milliGRAM(s) Oral before breakfast  senna 2 Tablet(s) Oral at bedtime      MEDICATIONS  (PRN):     Medications up to date at time of exam.    PHYSICAL EXAMINATION:  Patient has no new complaints.  GENERAL: The patient is a well-developed, well-nourished, in no apparent distress.     Vital Signs Last 24 Hrs  T(C): 36.6 (27 Aug 2018 14:03), Max: 36.6 (27 Aug 2018 14:03)  T(F): 97.9 (27 Aug 2018 14:03), Max: 97.9 (27 Aug 2018 14:03)  HR: 89 (27 Aug 2018 14:03) (69 - 89)  BP: 115/67 (27 Aug 2018 14:03) (115/67 - 130/71)  BP(mean): --  RR: 18 (27 Aug 2018 14:03) (16 - 18)  SpO2: 95% (27 Aug 2018 14:03) (95% - 99%)   (if applicable)    Chest Tube (if applicable)    HEENT: Head is normocephalic and atraumatic.     NECK: Supple, no palpable adenopathy.    LUNGS: Clear to auscultation, no wheezing, rales, or rhonchi.    HEART: Regular rate and rhythm without murmur.    ABDOMEN: Soft, nontender, and nondistended.      EXTREMITIES: Without any cyanosis, clubbing, rash, lesions or edema.    NEUROLOGIC: Awake, alert.    SKIN: Warm, dry, good turgor.    LABS:                        12.5   6.6   )-----------( 223      ( 27 Aug 2018 06:33 )             39.0     08-27    146<H>  |  110<H>  |  22<H>  ----------------------------<  90  3.7   |  28  |  0.71    Ca    9.0      27 Aug 2018 06:33      MICROBIOLOGY: (if applicable)    RADIOLOGY & ADDITIONAL STUDIES:  EKG:   CXR:  ECHO:    IMPRESSION: 67y Male PAST MEDICAL & SURGICAL HISTORY:  HLD (hyperlipidemia)  HTN (hypertension)  Parkinson's disease  Blindness of right eye  CVA (cerebral vascular accident)  History of laminectomy       Patient presents from home with SOB as per AG Gann.     +SOB due to acute bronchitis? vs aspiration PNA,, however CT results may be due to previously tx aspiration PNA, no leukocytosis, no fevers    RVP negative  +atelectasis    SUGGESTION:   - con't with bronchodilators, o2 supplement as needed.    - patient s/p abx   - aspiration precautions   - DVT and GI prophylaxis.     Agree with above assessment and plan as transcribed.

## 2018-08-27 NOTE — PROGRESS NOTE ADULT - PROBLEM SELECTOR PLAN 4
Confirmed home medications from [facility]  -Started on home medications   -Levodopa-carbidopa  four times a day  -Memantine 5mg once daily  NEUROLOGY FU AS OUT PATIENT
Confirmed home medications from [facility] [place]  -Started on home medications   -Levodopa-carbidopa  four times a day  -Memantine 5mg once daily
Confirmed home medications from [facility] [place]  -Started on home medications   -Levodopa-carbidopa  four times a day  -Memantine 5mg once daily  NEUROLOGY FU AS OUT PATIENT   MAY NEED TO INCREASE MEDS
[facility] [place]  -Started on home medications   -Levodopa-carbidopa  four times a day  -Memantine 5mg once daily
Confirmed home medications from [facility] care [place]  -Started on home medications   -Levodopa-carbidopa  four times a day  -Memantine 5mg once daily
Confirmed home medications from [facility] care [place]  -Started on home medications   -Levodopa-carbidopa  four times a day  -Memantine 5mg once daily  NEUROLOGY FU AS OUT PATIENT   MAY NEED TO INCREASE MEDS

## 2018-08-27 NOTE — DIETITIAN INITIAL EVALUATION ADULT. - PROBLEM SELECTOR PLAN 8
IMPROVE VTE Individual Risk Assessment    RISK                                                                Points    [  ] Previous VTE                                                  3  [  ] Thrombophilia                                               2  [  ] Lower limb paralysis                                      2        (unable to hold up >15 seconds)    [  ] Current Cancer                                              2         (within 6 months)  [ x ] Immobilization > 24 hrs                                1  [  ] ICU/CCU stay > 24 hours                              1  [ x] Age > 60                                                      1  IMPROVE VTE Score _____2____  - DVT ppx with lovenox 40 daily  -GI ppx with pantoprazole 40 mg daily

## 2018-08-27 NOTE — PROGRESS NOTE ADULT - PROBLEM SELECTOR PLAN 2
-Pt having recurrent episodes of aspiration pneumonia likely related to swallowing & speech difficulty d/t parkinson's  -Repeat speech & swallow eval noted - cont with assisted feed and full asp precautions
-Pt having recurrent episodes of aspiration pneumonia likely related to swallowing & speech difficulty d/t parkinson's  -Repeat speech & swallow eval  -Will keep pt npo until further recommendation  -Consult for Peg tube feeding   - Paliative consult
-Pt having recurrent episodes of aspiration pneumonia likely related to swallowing & speech difficulty d/t parkinson's  -Repeat speech & swallow eval noted - cont with assisted feed and full asp precautions  -Will keep pt npo until further recommendation  -Consult for Peg tube feeding   - Paliative consult
-Pt having recurrent episodes of aspiration pneumonia likely related to swallowing & speech difficulty d/t parkinson's  -Repeat speech & swallow eval noted - cont with assisted feed and full asp precautions  -Will keep pt npo until further recommendation  -Consult for Peg tube feeding   - Paliative consult
-Pt having recurrent episodes of aspiration pneumonia likely related to swallowing & speech difficulty d/t parkinson's- d/w neurology consult the possibility of increasing dopamine agonist for which she declined, citing psychosis vs adding an amphetamine (for which she cited in indication).  -Repeat speech & swallow eval noted - cont with assisted feed and full asp precautions  CONSIDER GI
-Pt having recurrent episodes of aspiration pneumonia likely related to swallowing & speech difficulty d/t parkinson's  -Repeat speech & swallow eval noted - cont with assisted feed and full asp precautions  CONSIDER GI

## 2018-08-27 NOTE — PROGRESS NOTE ADULT - PROBLEM SELECTOR PLAN 1
Sob & associated cough  Pt afebrile & normal wbc count  PE with crackles on left side  No leg swelling, tachycardia or tachypnoea    Plan:  Stat doses of levaquin & zosyn given in ed  CT chest noted - also 1cm thyroid nodule followed with U/S (endo consult to follow)  f/u blood & urine cultures & rvp  Aspiration precautions with assisted feed  Pt x-ray chest showed worsening of questionable opacity in right lower lobe  -Started pt on cefepime & flagyl as per Dr. Do for suspicion of aspiration pneumonia  - can proceed with d/c planning for home on po levaquin x 7 more days  ASPIRATION PRECAUTION
Sob & associated cough  Pt afebrile & normal wbc count  PE with crackles on left side  No leg swelling, tachycardia or tachypnoea    Plan:  Stat doses of levaquin & zosyn given in ed  CT chest noted - also 1cm thyroid nodule followed with U/S (endo consult to follow)  f/u blood & urine cultures & rvp  Aspiration precautions with assisted feed  Pt x-ray chest showed worsening of questionable opacity in right lower lobe  -Started pt on cefepime & flagyl as per Dr. Do for suspicion of aspiration pneumonia
Sob & associated cough  Pt afebrile & normal wbc count  PE with crackles on left side  No leg swelling, tachycardia or tachypnoea    Plan:  Stat doses of levaquin & zosyn given in ed  CT chest noted - also 1cm thyroid nodule followed with U/S (endo consult to follow)  f/u blood & urine cultures & rvp  Aspiration precautions with assisted feed  Pt x-ray chest showed worsening of questionable opacity in right lower lobe  -Started pt on cefepime & flagyl as per Dr. Do for suspicion of aspiration pneumonia  - can proceed with d/c planning for home on po levaquin x 7 more days
Sob & associated cough  Pt afebrile & normal wbc count  PE with crackles on left side  No leg swelling, tachycardia or tachypnoea    Plan:  Stat doses of levaquin & zosyn given in ed  CT chest noted - also 1cm thyroid nodule followed with U/S (endo consult to follow)  f/u blood & urine cultures & rvp  Aspiration precautions with assisted feed  Pt x-ray chest showed worsening of questionable opacity in right lower lobe  -Started pt on cefepime & flagyl as per Dr. Do for suspicion of aspiration pneumonia  - can proceed with d/c planning for home on po levaquin x 7 more days - will d/c home on monday with HHA
Sob & associated cough  Pt afebrile & normal wbc count  PE with crackles on left side -improved  No leg swelling, tachycardia or tachypnoea    Plan:  Stat doses of levaquin & zosyn given in ed  CT chest noted - also 1cm thyroid nodule followed with U/S (endo consult to follow)  f/u blood & urine cultures & rvp  Aspiration precautions with assisted feed  Pt x-ray chest showed worsening of questionable opacity in right lower lobe  -Started pt on cefepime & flagyl as per Dr. Do for suspicion of aspiration pneumonia  - can proceed with d/c planning for home on po levaquin x 7 more days  ASPIRATION PRECAUTION
Sob & associated cough  Pt afebrile & normal wbc count  PE with crackles on left side  No leg swelling, tachycardia or tachypnoea    Plan:  Stat doses of levaquin & zosyn given in ed  CT chest noted - also 1cm thyroid nodule followed with U/S (endo consult to follow)  f/u blood & urine cultures & rvp  Aspiration precautions with assisted feed  Pt x-ray chest showed worsening of questionable opacity in right lower lobe  -Started pt on cefepime & flagyl as per Dr. Do for suspicion of aspiration pneumonia  - can proceed with d/c planning for home on po levaquin x 7 more days  ASPIRATION PRECAUTION

## 2018-08-27 NOTE — PROGRESS NOTE ADULT - ASSESSMENT
Pt is a 66 y/o wheel chair bound male with PMH of Parkinson's Disease, CVA, HTN , HLD who was brought to ED by his HHA for cough & SOB. As per ED provider note, HHA reports that pt had worsening sob & cough for one day for which he was brought to ED, there was no asociated chest pain or fever.  Pt was recently admitted ot [facility] on June 30, 2018 with similar complaints, Pt was worked up for pmeumonia and was found have aspiration pneumonia for which he was started on zosyn, levaquin, flagyl & maxipime which were later discontinued as he remained afebrile & cultures remained negative. Pt was evaluated by Speech and swallow who initially recommend npo with crushed meds with applesauce and later on with clinical improvement pureed & honey thick liquids by spoons as tolerated. I was unable to talk to home health aid, called the [place] adult care agency and received call back from Mr. Jennings agency representative([phone]) who confirmed medications and to call in morning to speak to pt's nurse and HHA. (22 Aug 2018 00:04). afebrile on adm with stable bp, cxr neg , ct chest with RLL pneumonitis    # RLL atelectasis/pneumonitis likely asp pneumonitis doubt pna . all cx neg , low procal, doubt pna   No labs since 8/23/18    plan:  CBC and BMP for follow up.  d/c all ab.   f/u blood cx are negative.   Observe for fever, or leukocytosis.  Fluid and electrolytes management.     Dr. Do will be back to follow the patient for infectious diseases follow up from tomorrow.   stable for d/c from id pt of view   d/w medical resident
Pt is a 67 year old male with PMH of Parkinson's disease, htn, hld & cva who is currently admitted to medicine for management of SOB & cough         -
Pt is a 68 y/o wheel chair bound male with PMH of Parkinson's Disease, CVA, HTN , HLD who was brought to ED by his HHA for cough & SOB. As per ED provider note, HHA reports that pt had worsening sob & cough for one day for which he was brought to ED, there was no asociated chest pain or fever.  Pt was recently admitted ot [facility] on June 30, 2018 with similar complaints, Pt was worked up for pmeumonia and was found have aspiration pneumonia for which he was started on zosyn, levaquin, flagyl & maxipime which were later discontinued as he remained afebrile & cultures remained negative. Pt was evaluated by Speech and swallow who initially recommend npo with crushed meds with applesauce and later on with clinical improvement pureed & honey thick liquids by spoons as tolerated. I was unable to talk to home health aid, called the [place] adult care agency and received call back from Mr. Jennings agency representative([phone]) who confirmed medications and to call in morning to speak to pt's nurse and HHA. (22 Aug 2018 00:04). afebrile on adm with stable bp, cxr neg , ct chest with RLL pneumonitis    # RLL atelectasis/pneumonitis likely asp pneumonitis doubt pna . all cx neg , low procal, doubt pna     plan  d/c all ab   f/u blood cx ( neg )   Observe for fever, or leukocytosis.  Fluid and electrolytes management.     stable for d/c from id pt of view   d/w medical resident
Pt is a 68 y/o wheel chair bound male with PMH of Parkinson's Disease, CVA, HTN , HLD who was brought to ED by his HHA for cough & SOB. As per ED provider note, HHA reports that pt had worsening sob & cough for one day for which he was brought to ED, there was no asociated chest pain or fever.  Pt was recently admitted ot [facility] on June 30, 2018 with similar complaints, Pt was worked up for pmeumonia and was found have aspiration pneumonia for which he was started on zosyn, levaquin, flagyl & maxipime which were later discontinued as he remained afebrile & cultures remained negative. Pt was evaluated by Speech and swallow who initially recommend npo with crushed meds with applesauce and later on with clinical improvement pureed & honey thick liquids by spoons as tolerated. I was unable to talk to home health aid, called the [place] adult care agency and received call back from Mr. Jennings agency representative([phone]) who confirmed medications and to call in morning to speak to pt's nurse and HHA. (22 Aug 2018 00:04). afebrile on adm with stable bp, cxr neg , ct chest with RLL pneumonitis    # RLL atelectasis/pneumonitis likely asp pneumonitis doubt pna . all cx neg , low procal, doubt pna     plan  d/c all ab   f/u blood cx ( neg )     stable for d/c from id pt of view   d/w resident
Pt is a 67 year old male with PMH of Parkinson's disease, htn, hld & cva who is currently admitted to medicine for management of SOB & cough         -
Pt is a 67 year old male with PMH of Parkinson's disease, htn, hld & cva who is currently admitted to medicine for management of SOB & cough         -

## 2018-08-28 VITALS
SYSTOLIC BLOOD PRESSURE: 125 MMHG | DIASTOLIC BLOOD PRESSURE: 64 MMHG | RESPIRATION RATE: 18 BRPM | HEART RATE: 68 BPM | TEMPERATURE: 97 F | OXYGEN SATURATION: 99 %

## 2018-08-28 PROCEDURE — 96374 THER/PROPH/DIAG INJ IV PUSH: CPT

## 2018-08-28 PROCEDURE — 76536 US EXAM OF HEAD AND NECK: CPT

## 2018-08-28 PROCEDURE — 85027 COMPLETE CBC AUTOMATED: CPT

## 2018-08-28 PROCEDURE — 93005 ELECTROCARDIOGRAM TRACING: CPT

## 2018-08-28 PROCEDURE — 87486 CHLMYD PNEUM DNA AMP PROBE: CPT

## 2018-08-28 PROCEDURE — 80061 LIPID PANEL: CPT

## 2018-08-28 PROCEDURE — 83735 ASSAY OF MAGNESIUM: CPT

## 2018-08-28 PROCEDURE — 87086 URINE CULTURE/COLONY COUNT: CPT

## 2018-08-28 PROCEDURE — 81003 URINALYSIS AUTO W/O SCOPE: CPT

## 2018-08-28 PROCEDURE — 71045 X-RAY EXAM CHEST 1 VIEW: CPT

## 2018-08-28 PROCEDURE — 71250 CT THORAX DX C-: CPT

## 2018-08-28 PROCEDURE — 99285 EMERGENCY DEPT VISIT HI MDM: CPT | Mod: 25

## 2018-08-28 PROCEDURE — 80048 BASIC METABOLIC PNL TOTAL CA: CPT

## 2018-08-28 PROCEDURE — 83880 ASSAY OF NATRIURETIC PEPTIDE: CPT

## 2018-08-28 PROCEDURE — 87581 M.PNEUMON DNA AMP PROBE: CPT

## 2018-08-28 PROCEDURE — 82009 KETONE BODYS QUAL: CPT

## 2018-08-28 PROCEDURE — 96375 TX/PRO/DX INJ NEW DRUG ADDON: CPT

## 2018-08-28 PROCEDURE — 83605 ASSAY OF LACTIC ACID: CPT

## 2018-08-28 PROCEDURE — 84145 PROCALCITONIN (PCT): CPT

## 2018-08-28 PROCEDURE — 85730 THROMBOPLASTIN TIME PARTIAL: CPT

## 2018-08-28 PROCEDURE — 87040 BLOOD CULTURE FOR BACTERIA: CPT

## 2018-08-28 PROCEDURE — 92610 EVALUATE SWALLOWING FUNCTION: CPT

## 2018-08-28 PROCEDURE — 87798 DETECT AGENT NOS DNA AMP: CPT

## 2018-08-28 PROCEDURE — 80053 COMPREHEN METABOLIC PANEL: CPT

## 2018-08-28 PROCEDURE — 87633 RESP VIRUS 12-25 TARGETS: CPT

## 2018-08-28 PROCEDURE — 94640 AIRWAY INHALATION TREATMENT: CPT

## 2018-08-28 PROCEDURE — 85610 PROTHROMBIN TIME: CPT

## 2018-08-28 PROCEDURE — 97162 PT EVAL MOD COMPLEX 30 MIN: CPT

## 2018-08-28 PROCEDURE — 84100 ASSAY OF PHOSPHORUS: CPT

## 2018-08-28 RX ADMIN — Medication 81 MILLIGRAM(S): at 11:22

## 2018-08-28 RX ADMIN — CARBIDOPA AND LEVODOPA 1 TABLET(S): 25; 100 TABLET ORAL at 11:22

## 2018-08-28 RX ADMIN — CLOPIDOGREL BISULFATE 75 MILLIGRAM(S): 75 TABLET, FILM COATED ORAL at 11:22

## 2018-08-28 RX ADMIN — ENOXAPARIN SODIUM 40 MILLIGRAM(S): 100 INJECTION SUBCUTANEOUS at 11:21

## 2018-08-28 RX ADMIN — AMLODIPINE BESYLATE 5 MILLIGRAM(S): 2.5 TABLET ORAL at 06:33

## 2018-08-28 RX ADMIN — Medication 100 MILLIGRAM(S): at 06:29

## 2018-08-28 RX ADMIN — PANTOPRAZOLE SODIUM 40 MILLIGRAM(S): 20 TABLET, DELAYED RELEASE ORAL at 06:33

## 2018-08-28 RX ADMIN — CARBIDOPA AND LEVODOPA 1.5 TABLET(S): 25; 100 TABLET ORAL at 06:33

## 2018-08-28 NOTE — PROGRESS NOTE ADULT - SUBJECTIVE AND OBJECTIVE BOX
Time of Visit:  Patient seen and examined.     MEDICATIONS  (STANDING):  ALBUTerol/ipratropium for Nebulization 3 milliLiter(s) Nebulizer every 8 hours  amLODIPine   Tablet 5 milliGRAM(s) Oral daily  aspirin  chewable 81 milliGRAM(s) Oral daily  carbidopa/levodopa  25/100 1 Tablet(s) Oral <User Schedule>  carbidopa/levodopa  25/100 1.5 Tablet(s) Oral <User Schedule>  clopidogrel Tablet 75 milliGRAM(s) Oral daily  dextrose 5% + sodium chloride 0.9%. 1000 milliLiter(s) (70 mL/Hr) IV Continuous <Continuous>  docusate sodium 100 milliGRAM(s) Oral two times a day  enoxaparin Injectable 40 milliGRAM(s) SubCutaneous daily  memantine 5 milliGRAM(s) Oral at bedtime  pantoprazole   Suspension 40 milliGRAM(s) Oral before breakfast  senna 2 Tablet(s) Oral at bedtime      MEDICATIONS  (PRN):       Medications up to date at time of exam.    ROS; Non verbal. No fever, chills, cough, congestion.   PHYSICAL EXAMINATION:  Vital Signs Last 24 Hrs  T(C): 36.3 (28 Aug 2018 04:45), Max: 36.6 (27 Aug 2018 14:03)  T(F): 97.3 (28 Aug 2018 04:45), Max: 97.9 (27 Aug 2018 14:03)  HR: 68 (28 Aug 2018 04:45) (68 - 89)  BP: 125/64 (28 Aug 2018 04:45) (115/67 - 125/64)  BP(mean): --  RR: 18 (28 Aug 2018 04:45) (18 - 18)  SpO2: 99% (28 Aug 2018 04:45) (95% - 99%)   (if applicable)    General: Non verbal. Total care. Eyes follow verbal and tactile stimuli.     HEENT: Head is normocephalic and atraumatic. No nasal tenderness. Moist mucosa.     NECK: Supple, no palpable adenopathy.    LUNGS: Clear to auscultation, no wheezing, rales, or rhonchi. No use of accessory muscle.     HEART: S1 S2 Regular rate and no click/ rub.     ABDOMEN: Soft, nontender, and nondistended.  Active bowel sounds.    EXTREMITIES: Without any cyanosis, clubbing, rash, lesions or edema.    NEUROLOGIC: Cognitive impaired. Non verbal.      SKIN: Warm and moist. Non diaphoretic.       LABS:                        12.5   6.6   )-----------( 223      ( 27 Aug 2018 06:33 )             39.0     08-27    146<H>  |  110<H>  |  22<H>  ----------------------------<  90  3.7   |  28  |  0.71    Ca    9.0      27 Aug 2018 06:33    RADIOLOGY & ADDITIONAL STUDIES:  EKG:   Ct chest:< from: CT Chest No Cont (08.22.18 @ 09:40) >    EXAM:  CT CHEST                            PROCEDURE DATE:  08/22/2018          INTERPRETATION:  CLINICAL INFORMATION: Cough; shortness of breath;   clinical concern for pneumonia    COMPARISON: CT scan of the abdomen and pelvis acquired 6/5/2018, chest   x-ray acquired 8/21/2018.    PROCEDURE:   CT of the Chest was performed without intravenous contrast.  Sagittal and coronal reformats were performed.    Maximum intensity projection images were generated.       FINDINGS:    LUNGS AND LARGE AIRWAYS: Mild amount of secretions within the trachea.   Patent central airways.  Mild diffuse bronchial wall thickening.    Mild upper lung predominant centrilobular emphysema.    Mild basilar predominant peripheral reticulations, concerning for   asbestosis. Patchy groundglass opacities predominantly within the   dependent portion of right lower lobe may represent atypical infection,   pneumonitis or manifestation of interstitial lung disease secondary to   asbestosis.    PLEURA: Extensive bilateral calcified pleural plaques, left greater than   the right and within the diaphragmatic surface of the left lower lung.    Small left pleural effusion, unchanged.    VESSELS: The descending aorta is mildly dilated measuring 4.2 cm. The   descending aorta and aortic arch are normal in diameter.    The main pulmonary arteries normal in diameter.    Multivessel coronary atherosclerosis. Mild aortic atherosclerosis.    HEART: Heart size is normal. No pericardial effusion. Atherosclerotic   changes of the LAD, and circumflex arteries.     MEDIASTINUM AND MARLENY: No lymphadenopathy.    CHEST WALL AND LOWER NECK: Approximately 1 cm right thyroid nodule.    VISUALIZED UPPER ABDOMEN: cholelithiasis.    BONES: Old fracture deformity of the left seventh rib. Mild fracture   deformity of T10 and L1 vertebral bodies.    IMPRESSION:     *  Extensive bilateral calcified pleural plaques, left greater than the   right likely secondary to prior asbestosis exposure.    *  Mild basilar predominant peripheral reticulations, concerning for   asbestosis. Recommend follow-up chest CT with prone and supine technique   for further evaluation.    *  Patchy groundglass opacities predominantly within the dependent   portion of right lower lobe may represent atypicalinfection, pneumonitis   or manifestation of interstitial lung disease secondary to asbestosis.   Recommend follow-up in 3-4 weeks with supine and prone CT imaging.    *  Coronary atherosclerosis.    *  Approximately 1 cm right thyroid nodule. Recommend further evaluation   with ultrasound.    *  Mild fracture deformity of T10 and L1 vertebral bodies.    <  IMPRESSION: 67y Male PAST MEDICAL & SURGICAL HISTORY:  HLD (hyperlipidemia)  HTN (hypertension)  Parkinson's disease  Blindness of right eye  CVA (cerebral vascular accident)  History of laminectomy     Impression; 66 Y/O Male with prior mentioned multiple chronic conditions. Presented with SOB due to acute bronchitis? vs aspiration PNA,, however CT results may be due to previously tx aspiration PNA, been Afebrile. RVP negative. CT chest showed with ground glass patchy opacity with Small Left pleural Effusion. RVP, BLD Cx x 2 negative.    Suggestion;  Oxygen supplementation if needed. O2 saturation 98% room air.  Continue DuoNeb Q 6 hours.  Continue antibiotic.  DVT/ GI prophylactic.  Aspiration precautions.

## 2018-08-28 NOTE — PROGRESS NOTE ADULT - PROVIDER SPECIALTY LIST ADULT
Infectious Disease
Internal Medicine
Pulmonology
Internal Medicine
Internal Medicine

## 2018-09-22 ENCOUNTER — INPATIENT (INPATIENT)
Facility: HOSPITAL | Age: 68
LOS: 3 days | Discharge: HOME | DRG: 57 | End: 2018-09-26
Attending: INTERNAL MEDICINE | Admitting: INTERNAL MEDICINE
Payer: MEDICARE

## 2018-09-22 VITALS
RESPIRATION RATE: 18 BRPM | OXYGEN SATURATION: 100 % | WEIGHT: 179.9 LBS | HEART RATE: 82 BPM | SYSTOLIC BLOOD PRESSURE: 132 MMHG | HEIGHT: 71 IN | DIASTOLIC BLOOD PRESSURE: 81 MMHG

## 2018-09-22 DIAGNOSIS — R06.00 DYSPNEA, UNSPECIFIED: ICD-10-CM

## 2018-09-22 DIAGNOSIS — K59.00 CONSTIPATION, UNSPECIFIED: ICD-10-CM

## 2018-09-22 DIAGNOSIS — Z29.9 ENCOUNTER FOR PROPHYLACTIC MEASURES, UNSPECIFIED: ICD-10-CM

## 2018-09-22 DIAGNOSIS — I63.9 CEREBRAL INFARCTION, UNSPECIFIED: ICD-10-CM

## 2018-09-22 DIAGNOSIS — G20 PARKINSON'S DISEASE: ICD-10-CM

## 2018-09-22 DIAGNOSIS — Z98.89 OTHER SPECIFIED POSTPROCEDURAL STATES: Chronic | ICD-10-CM

## 2018-09-22 DIAGNOSIS — R09.89 OTHER SPECIFIED SYMPTOMS AND SIGNS INVOLVING THE CIRCULATORY AND RESPIRATORY SYSTEMS: ICD-10-CM

## 2018-09-22 DIAGNOSIS — I10 ESSENTIAL (PRIMARY) HYPERTENSION: ICD-10-CM

## 2018-09-22 LAB
ALBUMIN SERPL ELPH-MCNC: 3.7 G/DL — SIGNIFICANT CHANGE UP (ref 3.5–5)
ALP SERPL-CCNC: 92 U/L — SIGNIFICANT CHANGE UP (ref 40–120)
ALT FLD-CCNC: 10 U/L DA — SIGNIFICANT CHANGE UP (ref 10–60)
ANION GAP SERPL CALC-SCNC: 6 MMOL/L — SIGNIFICANT CHANGE UP (ref 5–17)
AST SERPL-CCNC: 20 U/L — SIGNIFICANT CHANGE UP (ref 10–40)
BASOPHILS # BLD AUTO: 0.1 K/UL — SIGNIFICANT CHANGE UP (ref 0–0.2)
BASOPHILS NFR BLD AUTO: 0.7 % — SIGNIFICANT CHANGE UP (ref 0–2)
BILIRUB SERPL-MCNC: 0.6 MG/DL — SIGNIFICANT CHANGE UP (ref 0.2–1.2)
BUN SERPL-MCNC: 12 MG/DL — SIGNIFICANT CHANGE UP (ref 7–18)
CALCIUM SERPL-MCNC: 8.8 MG/DL — SIGNIFICANT CHANGE UP (ref 8.4–10.5)
CHLORIDE SERPL-SCNC: 108 MMOL/L — SIGNIFICANT CHANGE UP (ref 96–108)
CO2 SERPL-SCNC: 26 MMOL/L — SIGNIFICANT CHANGE UP (ref 22–31)
CREAT SERPL-MCNC: 0.75 MG/DL — SIGNIFICANT CHANGE UP (ref 0.5–1.3)
EOSINOPHIL # BLD AUTO: 0.1 K/UL — SIGNIFICANT CHANGE UP (ref 0–0.5)
EOSINOPHIL NFR BLD AUTO: 1.5 % — SIGNIFICANT CHANGE UP (ref 0–6)
GLUCOSE SERPL-MCNC: 82 MG/DL — SIGNIFICANT CHANGE UP (ref 70–99)
HCT VFR BLD CALC: 43 % — SIGNIFICANT CHANGE UP (ref 39–50)
HGB BLD-MCNC: 13.9 G/DL — SIGNIFICANT CHANGE UP (ref 13–17)
LYMPHOCYTES # BLD AUTO: 2.1 K/UL — SIGNIFICANT CHANGE UP (ref 1–3.3)
LYMPHOCYTES # BLD AUTO: 30.7 % — SIGNIFICANT CHANGE UP (ref 13–44)
MCHC RBC-ENTMCNC: 29.7 PG — SIGNIFICANT CHANGE UP (ref 27–34)
MCHC RBC-ENTMCNC: 32.2 GM/DL — SIGNIFICANT CHANGE UP (ref 32–36)
MCV RBC AUTO: 92 FL — SIGNIFICANT CHANGE UP (ref 80–100)
MONOCYTES # BLD AUTO: 0.5 K/UL — SIGNIFICANT CHANGE UP (ref 0–0.9)
MONOCYTES NFR BLD AUTO: 7.7 % — SIGNIFICANT CHANGE UP (ref 2–14)
NEUTROPHILS # BLD AUTO: 4.1 K/UL — SIGNIFICANT CHANGE UP (ref 1.8–7.4)
NEUTROPHILS NFR BLD AUTO: 59.3 % — SIGNIFICANT CHANGE UP (ref 43–77)
PLATELET # BLD AUTO: 208 K/UL — SIGNIFICANT CHANGE UP (ref 150–400)
POTASSIUM SERPL-MCNC: 4.4 MMOL/L — SIGNIFICANT CHANGE UP (ref 3.5–5.3)
POTASSIUM SERPL-SCNC: 4.4 MMOL/L — SIGNIFICANT CHANGE UP (ref 3.5–5.3)
PROT SERPL-MCNC: 7.2 G/DL — SIGNIFICANT CHANGE UP (ref 6–8.3)
RBC # BLD: 4.67 M/UL — SIGNIFICANT CHANGE UP (ref 4.2–5.8)
RBC # FLD: 12.9 % — SIGNIFICANT CHANGE UP (ref 10.3–14.5)
SODIUM SERPL-SCNC: 140 MMOL/L — SIGNIFICANT CHANGE UP (ref 135–145)
WBC # BLD: 6.9 K/UL — SIGNIFICANT CHANGE UP (ref 3.8–10.5)
WBC # FLD AUTO: 6.9 K/UL — SIGNIFICANT CHANGE UP (ref 3.8–10.5)

## 2018-09-22 PROCEDURE — 99285 EMERGENCY DEPT VISIT HI MDM: CPT

## 2018-09-22 PROCEDURE — 74176 CT ABD & PELVIS W/O CONTRAST: CPT | Mod: 26

## 2018-09-22 PROCEDURE — 71045 X-RAY EXAM CHEST 1 VIEW: CPT | Mod: 26

## 2018-09-22 RX ORDER — DOCUSATE SODIUM 100 MG
100 CAPSULE ORAL
Qty: 0 | Refills: 0 | Status: DISCONTINUED | OUTPATIENT
Start: 2018-09-22 | End: 2018-09-22

## 2018-09-22 RX ORDER — SENNA PLUS 8.6 MG/1
2 TABLET ORAL AT BEDTIME
Qty: 0 | Refills: 0 | Status: DISCONTINUED | OUTPATIENT
Start: 2018-09-22 | End: 2018-09-26

## 2018-09-22 RX ORDER — CLOPIDOGREL BISULFATE 75 MG/1
75 TABLET, FILM COATED ORAL DAILY
Qty: 0 | Refills: 0 | Status: DISCONTINUED | OUTPATIENT
Start: 2018-09-22 | End: 2018-09-26

## 2018-09-22 RX ORDER — IPRATROPIUM/ALBUTEROL SULFATE 18-103MCG
3 AEROSOL WITH ADAPTER (GRAM) INHALATION EVERY 6 HOURS
Qty: 0 | Refills: 0 | Status: DISCONTINUED | OUTPATIENT
Start: 2018-09-22 | End: 2018-09-26

## 2018-09-22 RX ORDER — ACETYLCYSTEINE 200 MG/ML
3 VIAL (ML) MISCELLANEOUS EVERY 6 HOURS
Qty: 0 | Refills: 0 | Status: DISCONTINUED | OUTPATIENT
Start: 2018-09-22 | End: 2018-09-26

## 2018-09-22 RX ORDER — METOCLOPRAMIDE HCL 10 MG
10 TABLET ORAL ONCE
Qty: 0 | Refills: 0 | Status: COMPLETED | OUTPATIENT
Start: 2018-09-22 | End: 2018-09-22

## 2018-09-22 RX ORDER — ASPIRIN/CALCIUM CARB/MAGNESIUM 324 MG
81 TABLET ORAL DAILY
Qty: 0 | Refills: 0 | Status: DISCONTINUED | OUTPATIENT
Start: 2018-09-22 | End: 2018-09-26

## 2018-09-22 RX ORDER — CARBIDOPA AND LEVODOPA 25; 100 MG/1; MG/1
1 TABLET ORAL
Qty: 0 | Refills: 0 | Status: DISCONTINUED | OUTPATIENT
Start: 2018-09-22 | End: 2018-09-26

## 2018-09-22 RX ORDER — PANTOPRAZOLE SODIUM 20 MG/1
40 TABLET, DELAYED RELEASE ORAL
Qty: 0 | Refills: 0 | Status: DISCONTINUED | OUTPATIENT
Start: 2018-09-22 | End: 2018-09-26

## 2018-09-22 RX ORDER — ENOXAPARIN SODIUM 100 MG/ML
40 INJECTION SUBCUTANEOUS DAILY
Qty: 0 | Refills: 0 | Status: DISCONTINUED | OUTPATIENT
Start: 2018-09-22 | End: 2018-09-26

## 2018-09-22 RX ORDER — CARBIDOPA AND LEVODOPA 25; 100 MG/1; MG/1
1.5 TABLET ORAL
Qty: 0 | Refills: 0 | Status: DISCONTINUED | OUTPATIENT
Start: 2018-09-22 | End: 2018-09-26

## 2018-09-22 RX ORDER — LACTULOSE 10 G/15ML
10 SOLUTION ORAL ONCE
Qty: 0 | Refills: 0 | Status: COMPLETED | OUTPATIENT
Start: 2018-09-22 | End: 2018-09-22

## 2018-09-22 RX ORDER — MEMANTINE HYDROCHLORIDE 10 MG/1
5 TABLET ORAL AT BEDTIME
Qty: 0 | Refills: 0 | Status: DISCONTINUED | OUTPATIENT
Start: 2018-09-22 | End: 2018-09-26

## 2018-09-22 RX ORDER — AMLODIPINE BESYLATE 2.5 MG/1
5 TABLET ORAL DAILY
Qty: 0 | Refills: 0 | Status: DISCONTINUED | OUTPATIENT
Start: 2018-09-22 | End: 2018-09-26

## 2018-09-22 RX ORDER — DOCUSATE SODIUM 100 MG
100 CAPSULE ORAL
Qty: 0 | Refills: 0 | Status: DISCONTINUED | OUTPATIENT
Start: 2018-09-22 | End: 2018-09-26

## 2018-09-22 RX ORDER — METOCLOPRAMIDE HCL 10 MG
5 TABLET ORAL EVERY 6 HOURS
Qty: 0 | Refills: 0 | Status: DISCONTINUED | OUTPATIENT
Start: 2018-09-22 | End: 2018-09-24

## 2018-09-22 RX ADMIN — CLOPIDOGREL BISULFATE 75 MILLIGRAM(S): 75 TABLET, FILM COATED ORAL at 17:40

## 2018-09-22 RX ADMIN — CARBIDOPA AND LEVODOPA 1 TABLET(S): 25; 100 TABLET ORAL at 19:50

## 2018-09-22 RX ADMIN — Medication 100 MILLIGRAM(S): at 18:56

## 2018-09-22 RX ADMIN — SENNA PLUS 2 TABLET(S): 8.6 TABLET ORAL at 14:42

## 2018-09-22 RX ADMIN — LACTULOSE 10 GRAM(S): 10 SOLUTION ORAL at 18:36

## 2018-09-22 RX ADMIN — CARBIDOPA AND LEVODOPA 1.5 TABLET(S): 25; 100 TABLET ORAL at 14:42

## 2018-09-22 RX ADMIN — Medication 81 MILLIGRAM(S): at 17:40

## 2018-09-22 RX ADMIN — Medication 10 MILLIGRAM(S): at 17:49

## 2018-09-22 RX ADMIN — ENOXAPARIN SODIUM 40 MILLIGRAM(S): 100 INJECTION SUBCUTANEOUS at 17:40

## 2018-09-22 RX ADMIN — Medication 3 MILLILITER(S): at 13:22

## 2018-09-22 RX ADMIN — MEMANTINE HYDROCHLORIDE 5 MILLIGRAM(S): 10 TABLET ORAL at 22:05

## 2018-09-22 NOTE — ED PROVIDER NOTE - MEDICAL DECISION MAKING DETAILS
Pt with shortness of breath, history of aspiration and pneumonia. Discussed case with Dr. Diehl. No antibiotic, given clear CXR and normal WBC. Inpatient team will start antibiotic if indicated.

## 2018-09-22 NOTE — H&P ADULT - HISTORY OF PRESENT ILLNESS
Pt is a 66 y/o wheel chair bound male , AAO*0 , Non Verbal,  with PMH of Parkinson's Disease, CVA, HTN , HLD who was brought to ED by his HHA for gurgling sounds that patient is making from mouth. She also reported that patient was not his self since 1 day and indicated in sig language of pain , however cannot say where. Constipation * 3 days.  No other signs or symptoms noticed by HHA.   Patient was recently admitted to [facility] and treated for aspiration Pna vs atelectasis. Patient had persistent sob in that admission, however always saturated well on room air , no fever , no leucocytosis , CT scan showed findings of asbestosis , no infiltrate seen , Pro-calcitonin WNL.    In Ed , BP : 132 / 81mm hg , HR : 82 , Temp : 97.9 F  cbc and cmp wnl   CXR clear  EKG : NSR

## 2018-09-22 NOTE — ED PROVIDER NOTE - OBJECTIVE STATEMENT
66 y/o M PMHx of blindness of right eye, CVA, HTN, HLD, Parkinson's disease, PSHx of laminectomy presents to ED with c/o of intermittent shortness of breath. Home health aide is by the pt's bedside and reported that pt has been "groaning and not been looking right." Home health aid reports patient has been looking intermittently short of breath. Non-verbal at baseline. Unable to obtain an full HPI due to dementia. NKDA. 68 y/o M PMHx of blindness of right eye, CVA, HTN, HLD, Parkinson's disease, PSHx of laminectomy presents to ED with c/o of intermittent shortness of breath. Home health aide is by the pt's bedside and reported that pt has been "groaning and not been looking right" since early this morning. Home health aid reports patient has been looking intermittently short of breath. Non-verbal at baseline. Unable to obtain an full HPI due to dementia. NKDA. Patient lives at home with 24 HHAs, no family.

## 2018-09-22 NOTE — H&P ADULT - ASSESSMENT
Pt is a 66 y/o wheel chair bound male , AAO*0 , Non Verbal,  with PMH of Parkinson's Disease, CVA, HTN , HLD who was brought to ED by his HHA for gurgling sounds that patient is making from mouth. She also reported that patient was not his self since 1 day and indicated in sig language of pain , however cannot say where. Constipation * 3 days.  No other signs or symptoms noticed by HHA.   Patient was recently admitted to [facility] and treated for aspiration Pna vs atelectasis. Patient had persistent sob in that admission, however always saturated well on room air , no fever , no leucocytosis , CT scan showed findings of asbestosis , no infiltrate seen , Pro-calcitonin WNL.    In Ed , BP : 132 / 81mm hg , HR : 82 , Temp : 97.9 F  cbc and cmp wnl   CXR clear  EKG : NSR    Will admit for possible bronchitis vs discomfort from constipation.

## 2018-09-22 NOTE — H&P ADULT - NSHPLABSRESULTS_GEN_ALL_CORE
13.9   6.9   )-----------( 208      ( 22 Sep 2018 11:29 )             43.0       09-22    140  |  108  |  12  ----------------------------<  82  4.4   |  26  |  0.75    Ca    8.8      22 Sep 2018 11:29    TPro  7.2  /  Alb  3.7  /  TBili  0.6  /  DBili  x   /  AST  20  /  ALT  10  /  AlkPhos  92  09-22      < from: Xray Chest 1 View AP/PA (09.22.18 @ 11:11) >    TECHNIQUE:  Single portable frontal AP view of the chest was obtained.    FINDINGS: The examination of 8/20/2018 is available for review.    The lungs are free of infiltrate. The costophrenic angles are clear.   Pleural calcifications are seen in the left hemidiaphragm and left upper   chest, unchanged The heart and mediastinum appear intact. No destructive   lesion is seen in the visualized skeleton.      IMPRESSION:   No infiltrate.      < end of copied text >

## 2018-09-22 NOTE — H&P ADULT - PROBLEM SELECTOR PLAN 6
IMPROVE VTE Individual Risk Assessment          RISK                                                          Points  [  ] Previous VTE                                                3  [  ] Thrombophilia                                             2  [  ] Lower limb paralysis                                   2        (unable to hold up >15 seconds)    [  ] Current Cancer                                             2         (within 6 months)  [ x ] Immobilization > 24 hrs                              1  [  ] ICU/CCU stay > 24 hours                             1  [x  ] Age > 60                                                         1    IMPROVE VTE Score: 2   Levonox for dvt ppx

## 2018-09-22 NOTE — H&P ADULT - PROBLEM SELECTOR PLAN 1
Patient has gurgling sounds from mouth and upper respiratory tract   Could be secondary to upper respiratory tract infection vs aspiration pneumonitis vs mucous plugging from asbestosis and atelectasis as per CT Chest on last admission   No fever , no leucocytosis , no signs of infection   Would treat with just supportive measures   RVP   Bronchodilators prn   Mucomyst  Cough syrup   Dysphagia diet   Aspiration precautions

## 2018-09-22 NOTE — ED ADULT NURSE NOTE - NSIMPLEMENTINTERV_GEN_ALL_ED
Implemented All Fall with Harm Risk Interventions:  [place] to call system. Call bell, personal items and telephone within reach. Instruct patient to call for assistance. Room bathroom lighting operational. Non-slip footwear when patient is off stretcher. Physically safe environment: no spills, clutter or unnecessary equipment. Stretcher in lowest position, wheels locked, appropriate side rails in place. Provide visual cue, wrist band, yellow gown, etc. Monitor gait and stability. Monitor for mental status changes and reorient to person, place, and time. Review medications for side effects contributing to fall risk. Reinforce activity limits and safety measures with patient and family. Provide visual clues: red socks.

## 2018-09-22 NOTE — H&P ADULT - PROBLEM SELECTOR PLAN 2
no BM >3 days , known h/o constipation , mild tenderness on exam   Will obtain Xray and CT abdomen   Treat for constipation with stool softeners

## 2018-09-22 NOTE — H&P ADULT - NSHPPHYSICALEXAM_GEN_ALL_CORE
GENERAL: NAD  HEENT: Normocephalic;  conjunctivae and sclerae clear; moist mucous membranes; gurgling sounds from upper respiratory tract   NECK : supple  CHEST/LUNG: Clear to auscultation bilaterally with good air entry ; no rhonchi   HEART: S1 S2  regular; no murmurs, gallops or rubs  ABDOMEN: Soft, mild tenderness + , Nondistended; Bowel sounds present  EXTREMITIES: no cyanosis; no edema; no calf tenderness  SKIN: warm and dry; no rash  NERVOUS SYSTEM:  Awake and alert; Oriented *0 , non verbal

## 2018-09-23 LAB
ANION GAP SERPL CALC-SCNC: 9 MMOL/L — SIGNIFICANT CHANGE UP (ref 5–17)
BASOPHILS # BLD AUTO: 0.1 K/UL — SIGNIFICANT CHANGE UP (ref 0–0.2)
BASOPHILS NFR BLD AUTO: 0.8 % — SIGNIFICANT CHANGE UP (ref 0–2)
BUN SERPL-MCNC: 16 MG/DL — SIGNIFICANT CHANGE UP (ref 7–18)
CALCIUM SERPL-MCNC: 9.1 MG/DL — SIGNIFICANT CHANGE UP (ref 8.4–10.5)
CHLORIDE SERPL-SCNC: 107 MMOL/L — SIGNIFICANT CHANGE UP (ref 96–108)
CO2 SERPL-SCNC: 25 MMOL/L — SIGNIFICANT CHANGE UP (ref 22–31)
CREAT SERPL-MCNC: 0.68 MG/DL — SIGNIFICANT CHANGE UP (ref 0.5–1.3)
EOSINOPHIL # BLD AUTO: 0.1 K/UL — SIGNIFICANT CHANGE UP (ref 0–0.5)
EOSINOPHIL NFR BLD AUTO: 1.3 % — SIGNIFICANT CHANGE UP (ref 0–6)
GLUCOSE SERPL-MCNC: 73 MG/DL — SIGNIFICANT CHANGE UP (ref 70–99)
HBA1C BLD-MCNC: 5.1 % — SIGNIFICANT CHANGE UP (ref 4–5.6)
HCT VFR BLD CALC: 44.3 % — SIGNIFICANT CHANGE UP (ref 39–50)
HGB BLD-MCNC: 14 G/DL — SIGNIFICANT CHANGE UP (ref 13–17)
LYMPHOCYTES # BLD AUTO: 1.8 K/UL — SIGNIFICANT CHANGE UP (ref 1–3.3)
LYMPHOCYTES # BLD AUTO: 19 % — SIGNIFICANT CHANGE UP (ref 13–44)
MCHC RBC-ENTMCNC: 29.5 PG — SIGNIFICANT CHANGE UP (ref 27–34)
MCHC RBC-ENTMCNC: 31.7 GM/DL — LOW (ref 32–36)
MCV RBC AUTO: 93 FL — SIGNIFICANT CHANGE UP (ref 80–100)
MONOCYTES # BLD AUTO: 0.8 K/UL — SIGNIFICANT CHANGE UP (ref 0–0.9)
MONOCYTES NFR BLD AUTO: 8.1 % — SIGNIFICANT CHANGE UP (ref 2–14)
NEUTROPHILS # BLD AUTO: 6.6 K/UL — SIGNIFICANT CHANGE UP (ref 1.8–7.4)
NEUTROPHILS NFR BLD AUTO: 70.9 % — SIGNIFICANT CHANGE UP (ref 43–77)
PLATELET # BLD AUTO: 236 K/UL — SIGNIFICANT CHANGE UP (ref 150–400)
POTASSIUM SERPL-MCNC: 3.8 MMOL/L — SIGNIFICANT CHANGE UP (ref 3.5–5.3)
POTASSIUM SERPL-SCNC: 3.8 MMOL/L — SIGNIFICANT CHANGE UP (ref 3.5–5.3)
RBC # BLD: 4.76 M/UL — SIGNIFICANT CHANGE UP (ref 4.2–5.8)
RBC # FLD: 13 % — SIGNIFICANT CHANGE UP (ref 10.3–14.5)
SODIUM SERPL-SCNC: 141 MMOL/L — SIGNIFICANT CHANGE UP (ref 135–145)
WBC # BLD: 9.3 K/UL — SIGNIFICANT CHANGE UP (ref 3.8–10.5)
WBC # FLD AUTO: 9.3 K/UL — SIGNIFICANT CHANGE UP (ref 3.8–10.5)

## 2018-09-23 RX ADMIN — AMLODIPINE BESYLATE 5 MILLIGRAM(S): 2.5 TABLET ORAL at 05:54

## 2018-09-23 RX ADMIN — CARBIDOPA AND LEVODOPA 1.5 TABLET(S): 25; 100 TABLET ORAL at 14:16

## 2018-09-23 RX ADMIN — Medication 3 MILLILITER(S): at 00:09

## 2018-09-23 RX ADMIN — MEMANTINE HYDROCHLORIDE 5 MILLIGRAM(S): 10 TABLET ORAL at 21:34

## 2018-09-23 RX ADMIN — ENOXAPARIN SODIUM 40 MILLIGRAM(S): 100 INJECTION SUBCUTANEOUS at 12:13

## 2018-09-23 RX ADMIN — CLOPIDOGREL BISULFATE 75 MILLIGRAM(S): 75 TABLET, FILM COATED ORAL at 12:13

## 2018-09-23 RX ADMIN — CARBIDOPA AND LEVODOPA 1.5 TABLET(S): 25; 100 TABLET ORAL at 06:28

## 2018-09-23 RX ADMIN — Medication 81 MILLIGRAM(S): at 12:13

## 2018-09-23 RX ADMIN — CARBIDOPA AND LEVODOPA 1 TABLET(S): 25; 100 TABLET ORAL at 19:10

## 2018-09-23 RX ADMIN — CARBIDOPA AND LEVODOPA 1 TABLET(S): 25; 100 TABLET ORAL at 12:13

## 2018-09-23 RX ADMIN — Medication 3 MILLILITER(S): at 06:12

## 2018-09-23 RX ADMIN — Medication 3 MILLILITER(S): at 20:24

## 2018-09-23 RX ADMIN — Medication 3 MILLILITER(S): at 06:11

## 2018-09-23 NOTE — PROGRESS NOTE ADULT - PROBLEM SELECTOR PLAN 1
Patient had gurgling sounds from mouth and upper respiratory tract   Could be secondary to upper respiratory tract infection vs aspiration pneumonitis vs mucous plugging from asbestosis and atelectasis as per CT Chest on last admission yet this is all doubted based on detailed review of studies and PE -this appears to be 2/2 advancing symptomology of parkinsonian dementia.   -Pt at present no s/s of dysphagia or aspiration; will cont on assisted feed with full aspiration precautions, yet no merit for abx as no s/s asp or pna   No fever , no leucocytosis , no signs of infection   Would treat with just supportive measures   RVP   Bronchodilators prn   Mucomyst  Cough syrup   Dysphagia diet   Aspiration precautions - as detailed above

## 2018-09-23 NOTE — PROGRESS NOTE ADULT - ASSESSMENT
Pt is a 66 y/o wheel chair bound male , AAO*0 , Non Verbal,  with PMH of Parkinson's Disease, CVA, HTN , HLD who was brought to ED by his HHA for gurgling sounds that patient is making from mouth. She also reported that patient was not his self since 1 day and indicated in sig language of pain , however cannot say where. Constipation * 3 days.  No other signs or symptoms noticed by HHA.   Patient was recently admitted to [facility] and treated for aspiration Pna vs atelectasis. Patient had persistent sob in that admission, however always saturated well on room air , no fever , no leucocytosis , CT scan showed findings of asbestosis , no infiltrate seen , Pro-calcitonin WNL.    In Ed , BP : 132 / 81mm hg , HR : 82 , Temp : 97.9 F  cbc and cmp wnl   CXR clear  EKG : NSR    Will admit for possible bronchitis vs discomfort from constipation.

## 2018-09-23 NOTE — PROGRESS NOTE ADULT - SUBJECTIVE AND OBJECTIVE BOX
[facility]  [id number]  67y[facility]        Patient is a 67y old  Male who presents with a chief complaint of pain / gurgling sounds from mouth (22 Sep 2018 13:35)      INTERVAL HPI/OVERNIGHT EVENTS:  Patient seen and examined at bedside. No notations of chest pain, palpitation, SOB, orthopnea, nausea, vomiting or abdominal pain.    ALLERGIES  No Known Allergies      MEDICATIONS  acetylcysteine 10% Inhalation 3 milliLiter(s) Inhalation every 6 hours  ALBUTerol/ipratropium for Nebulization 3 milliLiter(s) Nebulizer every 6 hours PRN Shortness of Breath and/or Wheezing  amLODIPine   Tablet 5 milliGRAM(s) Oral daily  aspirin  chewable 81 milliGRAM(s) Oral daily  carbidopa/levodopa  25/100 1 Tablet(s) Oral <User Schedule>  carbidopa/levodopa  25/100 1.5 Tablet(s) Oral <User Schedule>  clopidogrel Tablet 75 milliGRAM(s) Oral daily  docusate sodium Liquid 100 milliGRAM(s) Oral two times a day  enoxaparin Injectable 40 milliGRAM(s) SubCutaneous daily  guaiFENesin   Syrup  (Sugar-Free) 100 milliGRAM(s) Oral every 6 hours PRN Cough  memantine 5 milliGRAM(s) Oral at bedtime  metoclopramide Injectable 5 milliGRAM(s) IV Push every 6 hours PRN nausea/vomiting  pantoprazole    Tablet 40 milliGRAM(s) Oral before breakfast  senna 2 Tablet(s) Oral at bedtime              REVIEW OF SYSTEMS:  CONSTITUTIONAL: No fever, weight loss, or fatigue  EYES: No eye pain, visual disturbances, or discharge  ENT:  No difficulty hearing, tinnitus, vertigo; No sinus or throat pain  NECK: No pain or stiffness  RESPIRATORY: No cough, wheezing, chills or hemoptysis; No Shortness of Breath  CARDIOVASCULAR: No chest pain, palpitations, passing out, dizziness, or leg swelling  GASTROINTESTINAL: No abdominal or epigastric pain. No nausea, vomiting, or hematemesis; No diarrhea or constipation. No melena or hematochezia.  GENITOURINARY: No dysuria, frequency, hematuria, or incontinence  NEUROLOGICAL: No headaches, memory loss, loss of strength, numbness, or tremors  SKIN: No itching, burning, rashes, or lesions   LYMPH Nodes: No enlarged glands  ENDOCRINE: No heat or cold intolerance; No hair loss  MUSCULOSKELETAL: No joint pain or swelling; No muscle, back, or extremity pain  PSYCHIATRIC: No depression, anxiety, mood swings, or difficulty sleeping  HEME/LYMPH: No easy bruising, or bleeding gums  ALLERGY AND IMMUNOLOGIC: No hives or eczema	    [ ] All others negative	  [ ] Unable to obtain      T(C): 36.4 (09-23-18 @ 13:46), Max: 36.8 (09-23-18 @ 05:40)  T(F): 97.5 (09-23-18 @ 13:46), Max: 98.2 (09-23-18 @ 05:40)  HR: 16 (09-23-18 @ 13:46) (16 - 78)  BP: 136/88 (09-23-18 @ 13:46) (117/57 - 136/88)  RR: 17 (09-23-18 @ 05:40) (16 - 17)  SpO2: 98% (09-23-18 @ 05:40) (98% - 99%)  Wt(kg): --    I&O's Summary        PHYSICAL EXAM:  A X O x  HEAD:  Atraumatic, Normocephalic  EYES: EOMI, PERRLA, conjunctiva and sclera clear  NECK: Supple, No JVD, Normal thyroid  Resp: CTAB, No crackles, wheezing,   CVS: Regular rate and rhythm; No discernable murmurs, rubs, or gallops  ABD: Soft, Nontender, Nondistended; Bowel sounds present  EXTREMITIES:  2+ Peripheral Pulses, No edema  LYMPH: No dicernable lymphadenopathy noted  GENERAL: NAD, well-groomed, well-developed      LABS:                        14.0   9.3   )-----------( 236      ( 23 Sep 2018 06:41 )             44.3     09-23    141  |  107  |  16  ----------------------------<  73  3.8   |  25  |  0.68    Ca    9.1      23 Sep 2018 06:41    TPro  7.2  /  Alb  3.7  /  TBili  0.6  /  DBili  x   /  AST  20  /  ALT  10  /  AlkPhos  92  09-22        CAPILLARY BLOOD GLUCOSE          Troponins:  ProBNP:  Lipid Profile:   HgA1c:  TSH:           RADIOLOGY & ADDITIONAL TESTS:    Imaging Personally Reviewed:  [ ] YES  [ ] NO      Consultant(s) Notes Reviewed:  [x ] YES  [ ] NO    Care Discussed with Consultants/Other Providers [ x] YES  [ ] NO          PAST MEDICAL & SURGICAL HISTORY:  HLD (hyperlipidemia)  HTN (hypertension)  Parkinson's disease  Blindness of right eye  CVA (cerebral vascular accident)  History of laminectomy        Dyspnea  No pertinent family history in first degree relatives  Handoff  MEWS Score  HLD (hyperlipidemia)  HTN (hypertension)  Parkinson's disease  Blindness of right eye  CVA (cerebral vascular accident)  HLD (hyperlipidemia)  HTN (hypertension)  Parkinson's disease  Dyspnea, unspecified type  Prophylactic measure  CVA (cerebral vascular accident)  HTN (hypertension)  Parkinson's disease  Constipation  Gurgling breath sounds  History of laminectomy  A) CONGESTION  24

## 2018-09-24 LAB
ANION GAP SERPL CALC-SCNC: 8 MMOL/L — SIGNIFICANT CHANGE UP (ref 5–17)
BASOPHILS # BLD AUTO: 0.1 K/UL — SIGNIFICANT CHANGE UP (ref 0–0.2)
BASOPHILS NFR BLD AUTO: 0.8 % — SIGNIFICANT CHANGE UP (ref 0–2)
BUN SERPL-MCNC: 23 MG/DL — HIGH (ref 7–18)
CALCIUM SERPL-MCNC: 8.7 MG/DL — SIGNIFICANT CHANGE UP (ref 8.4–10.5)
CHLORIDE SERPL-SCNC: 109 MMOL/L — HIGH (ref 96–108)
CO2 SERPL-SCNC: 27 MMOL/L — SIGNIFICANT CHANGE UP (ref 22–31)
CREAT SERPL-MCNC: 0.73 MG/DL — SIGNIFICANT CHANGE UP (ref 0.5–1.3)
EOSINOPHIL # BLD AUTO: 0.1 K/UL — SIGNIFICANT CHANGE UP (ref 0–0.5)
EOSINOPHIL NFR BLD AUTO: 1.4 % — SIGNIFICANT CHANGE UP (ref 0–6)
GLUCOSE SERPL-MCNC: 92 MG/DL — SIGNIFICANT CHANGE UP (ref 70–99)
HCT VFR BLD CALC: 42.5 % — SIGNIFICANT CHANGE UP (ref 39–50)
HGB BLD-MCNC: 13.5 G/DL — SIGNIFICANT CHANGE UP (ref 13–17)
LYMPHOCYTES # BLD AUTO: 2.3 K/UL — SIGNIFICANT CHANGE UP (ref 1–3.3)
LYMPHOCYTES # BLD AUTO: 33.7 % — SIGNIFICANT CHANGE UP (ref 13–44)
MCHC RBC-ENTMCNC: 29.4 PG — SIGNIFICANT CHANGE UP (ref 27–34)
MCHC RBC-ENTMCNC: 31.7 GM/DL — LOW (ref 32–36)
MCV RBC AUTO: 92.8 FL — SIGNIFICANT CHANGE UP (ref 80–100)
MONOCYTES # BLD AUTO: 0.6 K/UL — SIGNIFICANT CHANGE UP (ref 0–0.9)
MONOCYTES NFR BLD AUTO: 8.8 % — SIGNIFICANT CHANGE UP (ref 2–14)
NEUTROPHILS # BLD AUTO: 3.8 K/UL — SIGNIFICANT CHANGE UP (ref 1.8–7.4)
NEUTROPHILS NFR BLD AUTO: 55.2 % — SIGNIFICANT CHANGE UP (ref 43–77)
PLATELET # BLD AUTO: 241 K/UL — SIGNIFICANT CHANGE UP (ref 150–400)
POTASSIUM SERPL-MCNC: 3.6 MMOL/L — SIGNIFICANT CHANGE UP (ref 3.5–5.3)
POTASSIUM SERPL-SCNC: 3.6 MMOL/L — SIGNIFICANT CHANGE UP (ref 3.5–5.3)
RBC # BLD: 4.57 M/UL — SIGNIFICANT CHANGE UP (ref 4.2–5.8)
RBC # FLD: 13.1 % — SIGNIFICANT CHANGE UP (ref 10.3–14.5)
SODIUM SERPL-SCNC: 144 MMOL/L — SIGNIFICANT CHANGE UP (ref 135–145)
WBC # BLD: 6.9 K/UL — SIGNIFICANT CHANGE UP (ref 3.8–10.5)
WBC # FLD AUTO: 6.9 K/UL — SIGNIFICANT CHANGE UP (ref 3.8–10.5)

## 2018-09-24 RX ORDER — METOCLOPRAMIDE HCL 10 MG
5 TABLET ORAL EVERY 8 HOURS
Qty: 0 | Refills: 0 | Status: DISCONTINUED | OUTPATIENT
Start: 2018-09-24 | End: 2018-09-25

## 2018-09-24 RX ADMIN — Medication 100 MILLIGRAM(S): at 05:08

## 2018-09-24 RX ADMIN — AMLODIPINE BESYLATE 5 MILLIGRAM(S): 2.5 TABLET ORAL at 05:07

## 2018-09-24 RX ADMIN — CARBIDOPA AND LEVODOPA 1 TABLET(S): 25; 100 TABLET ORAL at 12:16

## 2018-09-24 RX ADMIN — Medication 3 MILLILITER(S): at 14:36

## 2018-09-24 RX ADMIN — ENOXAPARIN SODIUM 40 MILLIGRAM(S): 100 INJECTION SUBCUTANEOUS at 12:16

## 2018-09-24 RX ADMIN — Medication 3 MILLILITER(S): at 20:29

## 2018-09-24 RX ADMIN — Medication 3 MILLILITER(S): at 08:52

## 2018-09-24 RX ADMIN — CARBIDOPA AND LEVODOPA 1 TABLET(S): 25; 100 TABLET ORAL at 18:51

## 2018-09-24 RX ADMIN — CARBIDOPA AND LEVODOPA 1.5 TABLET(S): 25; 100 TABLET ORAL at 15:49

## 2018-09-24 RX ADMIN — Medication 100 MILLIGRAM(S): at 18:51

## 2018-09-24 RX ADMIN — CLOPIDOGREL BISULFATE 75 MILLIGRAM(S): 75 TABLET, FILM COATED ORAL at 12:16

## 2018-09-24 RX ADMIN — Medication 81 MILLIGRAM(S): at 12:16

## 2018-09-24 RX ADMIN — Medication 5 MILLIGRAM(S): at 18:50

## 2018-09-24 RX ADMIN — MEMANTINE HYDROCHLORIDE 5 MILLIGRAM(S): 10 TABLET ORAL at 21:37

## 2018-09-24 RX ADMIN — CARBIDOPA AND LEVODOPA 1.5 TABLET(S): 25; 100 TABLET ORAL at 06:03

## 2018-09-24 RX ADMIN — Medication 3 MILLILITER(S): at 02:13

## 2018-09-24 RX ADMIN — Medication 3 MILLILITER(S): at 08:51

## 2018-09-24 RX ADMIN — Medication 3 MILLILITER(S): at 14:35

## 2018-09-24 RX ADMIN — SENNA PLUS 2 TABLET(S): 8.6 TABLET ORAL at 21:37

## 2018-09-24 NOTE — PROGRESS NOTE ADULT - ASSESSMENT
Pt is a 68 y/o wheel chair bound male , AAO*0 , Non Verbal,  with PMH of Parkinson's Disease, CVA, HTN , HLD who was brought to ED by his HHA for gurgling sounds that patient is making from mouth. She also reported that patient was not his self since 1 day and indicated in sig language of pain , however cannot say where. Constipation * 3 days.  No other signs or symptoms noticed by HHA    Will admit for possible bronchitis vs discomfort from constipation.     D/c plan tomorrow morning.

## 2018-09-24 NOTE — PROGRESS NOTE ADULT - ATTENDING COMMENTS
Agreed and concurred on the above.  Pt continues to be at baseline without any change. he was evaluated for dysphagia last time and recommended for dysphagic diet with full aspiration precautionary measures and assisted feed. This situation has not changed as he is observed at his baseline without any s/s aspiration (i.e.  postprandial cough) or pna to date.

## 2018-09-24 NOTE — PROGRESS NOTE ADULT - PROBLEM SELECTOR PLAN 2
no BM >3 days , known h/o constipation , mild tenderness on exam   CT abdomen as above   Treat for constipation with stool softeners  Will give reglan for one day then stop it.

## 2018-09-24 NOTE — PROGRESS NOTE ADULT - PROBLEM SELECTOR PLAN 1
C/w supportive measures   Bronchodilators prn   Mucomyst  Cough syrup   Dysphagia diet   Aspiration precautions

## 2018-09-24 NOTE — PROGRESS NOTE ADULT - SUBJECTIVE AND OBJECTIVE BOX
PGY 1 Note discussed with supervising resident and primary attending    Patient is a 67y old  Male who presents with a chief complaint of pain / gurgling sounds from mouth (23 Sep 2018 17:18)      INTERVAL HPI/OVERNIGHT EVENTS: offers no new complaints; current symptoms resolving    MEDICATIONS  (STANDING):  acetylcysteine 10% Inhalation 3 milliLiter(s) Inhalation every 6 hours  amLODIPine   Tablet 5 milliGRAM(s) Oral daily  aspirin  chewable 81 milliGRAM(s) Oral daily  carbidopa/levodopa  25/100 1 Tablet(s) Oral <User Schedule>  carbidopa/levodopa  25/100 1.5 Tablet(s) Oral <User Schedule>  clopidogrel Tablet 75 milliGRAM(s) Oral daily  docusate sodium Liquid 100 milliGRAM(s) Oral two times a day  enoxaparin Injectable 40 milliGRAM(s) SubCutaneous daily  memantine 5 milliGRAM(s) Oral at bedtime  metoclopramide Injectable 5 milliGRAM(s) IV Push every 8 hours  pantoprazole    Tablet 40 milliGRAM(s) Oral before breakfast  senna 2 Tablet(s) Oral at bedtime    MEDICATIONS  (PRN):  ALBUTerol/ipratropium for Nebulization 3 milliLiter(s) Nebulizer every 6 hours PRN Shortness of Breath and/or Wheezing  guaiFENesin   Syrup  (Sugar-Free) 100 milliGRAM(s) Oral every 6 hours PRN Cough      __________________________________________________  REVIEW OF SYSTEMS:    CONSTITUTIONAL: No fever,   EYES: no acute visual disturbances  NECK: No pain or stiffness  RESPIRATORY: No cough; No shortness of breath  CARDIOVASCULAR: No chest pain, no palpitations  GASTROINTESTINAL: No pain. No nausea or vomiting; No diarrhea   NEUROLOGICAL: No headache or numbness, no tremors  MUSCULOSKELETAL: No joint pain, no muscle pain  GENITOURINARY: no dysuria, no frequency, no hesitancy  PSYCHIATRY: no depression , no anxiety  ALL OTHER  ROS negative        Vital Signs Last 24 Hrs  T(C): 37 (24 Sep 2018 13:34), Max: 37.1 (23 Sep 2018 21:23)  T(F): 98.6 (24 Sep 2018 13:34), Max: 98.8 (23 Sep 2018 21:23)  HR: 92 (24 Sep 2018 13:34) (78 - 92)  BP: 125/67 (24 Sep 2018 13:34) (125/67 - 137/75)  BP(mean): --  RR: 18 (24 Sep 2018 13:34) (17 - 18)  SpO2: 96% (24 Sep 2018 13:34) (96% - 98%)    ________________________________________________  PHYSICAL EXAM:  GENERAL: NAD  HEENT: Normocephalic;  conjunctivae and sclerae clear; moist mucous membranes;   NECK : supple  CHEST/LUNG: Clear to auscultation bilaterally with good air entry   HEART: S1 S2  regular; no murmurs, gallops or rubs  ABDOMEN: Soft, Nontender, Nondistended; Bowel sounds present  EXTREMITIES: no cyanosis; no edema; no calf tenderness  SKIN: warm and dry; no rash  NERVOUS SYSTEM:  Non verbal, AOA x 0  _________________________________________________  LABS:                        13.5   6.9   )-----------( 241      ( 24 Sep 2018 06:06 )             42.5     09-24    144  |  109<H>  |  23<H>  ----------------------------<  92  3.6   |  27  |  0.73    Ca    8.7      24 Sep 2018 06:06          CAPILLARY BLOOD GLUCOSE            RADIOLOGY & ADDITIONAL TESTS:  Ct abd:  IMPRESSION: Cholelithiasis. Distended stomach possibly due to   gastroparesis. Compression fracture of L1 vertebral body, unchanged.        Imaging Personally Reviewed:  YES    Consultant(s) Notes Reviewed:   YES    Care Discussed with Consultants :     Plan of care was discussed with patient and /or primary care giver; all questions and concerns were addressed and care was aligned with patient's wishes.

## 2018-09-25 LAB
ANION GAP SERPL CALC-SCNC: 8 MMOL/L — SIGNIFICANT CHANGE UP (ref 5–17)
BASOPHILS # BLD AUTO: 0.1 K/UL — SIGNIFICANT CHANGE UP (ref 0–0.2)
BASOPHILS NFR BLD AUTO: 0.7 % — SIGNIFICANT CHANGE UP (ref 0–2)
BUN SERPL-MCNC: 20 MG/DL — HIGH (ref 7–18)
CALCIUM SERPL-MCNC: 8.9 MG/DL — SIGNIFICANT CHANGE UP (ref 8.4–10.5)
CHLORIDE SERPL-SCNC: 111 MMOL/L — HIGH (ref 96–108)
CO2 SERPL-SCNC: 27 MMOL/L — SIGNIFICANT CHANGE UP (ref 22–31)
CREAT SERPL-MCNC: 0.65 MG/DL — SIGNIFICANT CHANGE UP (ref 0.5–1.3)
EOSINOPHIL # BLD AUTO: 0.1 K/UL — SIGNIFICANT CHANGE UP (ref 0–0.5)
EOSINOPHIL NFR BLD AUTO: 1.2 % — SIGNIFICANT CHANGE UP (ref 0–6)
GLUCOSE SERPL-MCNC: 93 MG/DL — SIGNIFICANT CHANGE UP (ref 70–99)
HCT VFR BLD CALC: 40.1 % — SIGNIFICANT CHANGE UP (ref 39–50)
HGB BLD-MCNC: 12.9 G/DL — LOW (ref 13–17)
LYMPHOCYTES # BLD AUTO: 1.8 K/UL — SIGNIFICANT CHANGE UP (ref 1–3.3)
LYMPHOCYTES # BLD AUTO: 20.1 % — SIGNIFICANT CHANGE UP (ref 13–44)
MCHC RBC-ENTMCNC: 29.3 PG — SIGNIFICANT CHANGE UP (ref 27–34)
MCHC RBC-ENTMCNC: 32.1 GM/DL — SIGNIFICANT CHANGE UP (ref 32–36)
MCV RBC AUTO: 91.2 FL — SIGNIFICANT CHANGE UP (ref 80–100)
MONOCYTES # BLD AUTO: 0.7 K/UL — SIGNIFICANT CHANGE UP (ref 0–0.9)
MONOCYTES NFR BLD AUTO: 8 % — SIGNIFICANT CHANGE UP (ref 2–14)
NEUTROPHILS # BLD AUTO: 6.3 K/UL — SIGNIFICANT CHANGE UP (ref 1.8–7.4)
NEUTROPHILS NFR BLD AUTO: 70.1 % — SIGNIFICANT CHANGE UP (ref 43–77)
PLATELET # BLD AUTO: 226 K/UL — SIGNIFICANT CHANGE UP (ref 150–400)
POTASSIUM SERPL-MCNC: 3.5 MMOL/L — SIGNIFICANT CHANGE UP (ref 3.5–5.3)
POTASSIUM SERPL-SCNC: 3.5 MMOL/L — SIGNIFICANT CHANGE UP (ref 3.5–5.3)
RBC # BLD: 4.4 M/UL — SIGNIFICANT CHANGE UP (ref 4.2–5.8)
RBC # FLD: 13 % — SIGNIFICANT CHANGE UP (ref 10.3–14.5)
SODIUM SERPL-SCNC: 146 MMOL/L — HIGH (ref 135–145)
WBC # BLD: 9 K/UL — SIGNIFICANT CHANGE UP (ref 3.8–10.5)
WBC # FLD AUTO: 9 K/UL — SIGNIFICANT CHANGE UP (ref 3.8–10.5)

## 2018-09-25 RX ADMIN — CARBIDOPA AND LEVODOPA 1 TABLET(S): 25; 100 TABLET ORAL at 11:39

## 2018-09-25 RX ADMIN — Medication 3 MILLILITER(S): at 20:30

## 2018-09-25 RX ADMIN — MEMANTINE HYDROCHLORIDE 5 MILLIGRAM(S): 10 TABLET ORAL at 21:32

## 2018-09-25 RX ADMIN — AMLODIPINE BESYLATE 5 MILLIGRAM(S): 2.5 TABLET ORAL at 06:36

## 2018-09-25 RX ADMIN — Medication 100 MILLIGRAM(S): at 06:36

## 2018-09-25 RX ADMIN — CARBIDOPA AND LEVODOPA 1 TABLET(S): 25; 100 TABLET ORAL at 18:49

## 2018-09-25 RX ADMIN — PANTOPRAZOLE SODIUM 40 MILLIGRAM(S): 20 TABLET, DELAYED RELEASE ORAL at 06:37

## 2018-09-25 RX ADMIN — Medication 3 MILLILITER(S): at 14:26

## 2018-09-25 RX ADMIN — Medication 100 MILLIGRAM(S): at 17:45

## 2018-09-25 RX ADMIN — Medication 3 MILLILITER(S): at 08:53

## 2018-09-25 RX ADMIN — CARBIDOPA AND LEVODOPA 1.5 TABLET(S): 25; 100 TABLET ORAL at 14:53

## 2018-09-25 RX ADMIN — Medication 5 MILLIGRAM(S): at 01:35

## 2018-09-25 RX ADMIN — Medication 3 MILLILITER(S): at 08:54

## 2018-09-25 RX ADMIN — CLOPIDOGREL BISULFATE 75 MILLIGRAM(S): 75 TABLET, FILM COATED ORAL at 11:39

## 2018-09-25 RX ADMIN — SENNA PLUS 2 TABLET(S): 8.6 TABLET ORAL at 21:32

## 2018-09-25 RX ADMIN — CARBIDOPA AND LEVODOPA 1.5 TABLET(S): 25; 100 TABLET ORAL at 06:36

## 2018-09-25 RX ADMIN — Medication 81 MILLIGRAM(S): at 11:39

## 2018-09-25 RX ADMIN — ENOXAPARIN SODIUM 40 MILLIGRAM(S): 100 INJECTION SUBCUTANEOUS at 11:56

## 2018-09-25 RX ADMIN — Medication 5 MILLIGRAM(S): at 10:00

## 2018-09-25 NOTE — DISCHARGE NOTE ADULT - HOSPITAL COURSE
Pt is a 66 y/o wheel chair bound male , AAO*0 , Non Verbal,  with PMH of Parkinson's Disease, CVA, HTN , HLD who was brought to ED by his HHA for gurgling sounds that patient is making from mouth. She also reported that patient was not his self since 1 day and indicated in sig language of pain , however cannot say where. Constipation * 3 days. No other signs or symptoms noticed by HHA. Pt was admitted for bronchitis vs discomfort from constipation.    Hospital course:  pt had chest xray showed no infiltrate, blood cultures negative till date, no fever, no leukocytosis. pt had ct abdomen showed cholelithiasis, distended stomach due to gastroparesis, unchanged vertebral compression fracture. Pt was treated with reglan for gastroparesis. and all home meds for PKD, HTN, HLD, CVA, Constipation were continued.  Pt is medically stable for discharge D/w Dr. Diehl.

## 2018-09-25 NOTE — PROGRESS NOTE ADULT - SUBJECTIVE AND OBJECTIVE BOX
PGY 1 Note discussed with supervising resident and primary attending    Patient is a 67y old  Male who presents with a chief complaint of pain / gurgling sounds from mouth (25 Sep 2018 04:17)      INTERVAL HPI/OVERNIGHT EVENTS: offers no new complaints; current symptoms resolving    MEDICATIONS  (STANDING):  acetylcysteine 10% Inhalation 3 milliLiter(s) Inhalation every 6 hours  amLODIPine   Tablet 5 milliGRAM(s) Oral daily  aspirin  chewable 81 milliGRAM(s) Oral daily  carbidopa/levodopa  25/100 1 Tablet(s) Oral <User Schedule>  carbidopa/levodopa  25/100 1.5 Tablet(s) Oral <User Schedule>  clopidogrel Tablet 75 milliGRAM(s) Oral daily  docusate sodium Liquid 100 milliGRAM(s) Oral two times a day  enoxaparin Injectable 40 milliGRAM(s) SubCutaneous daily  memantine 5 milliGRAM(s) Oral at bedtime  metoclopramide Injectable 5 milliGRAM(s) IV Push every 8 hours  pantoprazole    Tablet 40 milliGRAM(s) Oral before breakfast  senna 2 Tablet(s) Oral at bedtime    MEDICATIONS  (PRN):  ALBUTerol/ipratropium for Nebulization 3 milliLiter(s) Nebulizer every 6 hours PRN Shortness of Breath and/or Wheezing  guaiFENesin   Syrup  (Sugar-Free) 100 milliGRAM(s) Oral every 6 hours PRN Cough      __________________________________________________  REVIEW OF SYSTEMS:    CONSTITUTIONAL: No fever,   EYES: no acute visual disturbances  NECK: No pain or stiffness  RESPIRATORY: No cough; No shortness of breath  CARDIOVASCULAR: No chest pain, no palpitations  GASTROINTESTINAL: No pain. No nausea or vomiting; No diarrhea   NEUROLOGICAL: No headache or numbness, no tremors  MUSCULOSKELETAL: No joint pain, no muscle pain  GENITOURINARY: no dysuria, no frequency, no hesitancy  PSYCHIATRY: no depression , no anxiety  ALL OTHER  ROS negative        Vital Signs Last 24 Hrs  T(C): 36.8 (25 Sep 2018 14:24), Max: 36.9 (25 Sep 2018 05:28)  T(F): 98.3 (25 Sep 2018 14:24), Max: 98.4 (25 Sep 2018 05:28)  HR: 93 (25 Sep 2018 14:24) (76 - 93)  BP: 134/74 (25 Sep 2018 14:24) (127/66 - 134/74)  BP(mean): --  RR: 18 (25 Sep 2018 14:24) (18 - 18)  SpO2: 99% (25 Sep 2018 14:24) (97% - 100%)    ________________________________________________  PHYSICAL EXAM:  GENERAL: Malnourished, wheelchair bound  HEENT: Normocephalic;  conjunctivae and sclerae clear  NECK : supple  CHEST/LUNG: Clear to auscultation bilaterally with good air entry   HEART: S1 S2  regular; no murmurs, gallops or rubs  ABDOMEN: Soft, Nontender, Nondistended; Bowel sounds present  EXTREMITIES: Bradykinesia   SKIN: warm and dry; no rash  NERVOUS SYSTEM:  pt's mental status is difficult to assess as pt is non verbal, but pt is following commands.     _________________________________________________  LABS:                        12.9   9.0   )-----------( 226      ( 25 Sep 2018 05:57 )             40.1     09-25    146<H>  |  111<H>  |  20<H>  ----------------------------<  93  3.5   |  27  |  0.65    Ca    8.9      25 Sep 2018 05:57          CAPILLARY BLOOD GLUCOSE            RADIOLOGY & ADDITIONAL TESTS:    Imaging Personally Reviewed:  YES    Consultant(s) Notes Reviewed:   YES    Plan of care was discussed with patient and /or primary care giver; all questions and concerns were addressed and care was aligned with patient's wishes.

## 2018-09-25 NOTE — DISCHARGE NOTE ADULT - SECONDARY DIAGNOSIS.
Parkinson's disease HTN (hypertension) HLD (hyperlipidemia) CVA (cerebral vascular accident) Constipation

## 2018-09-25 NOTE — PROGRESS NOTE ADULT - ASSESSMENT
Pt is a 68 y/o wheel chair bound male , AAO*0 , Non Verbal,  with PMH of Parkinson's Disease, CVA, HTN , HLD who was brought to ED by his HHA for gurgling sounds that patient is making from mouth. She also reported that patient was not his self since 1 day and indicated in sig language of pain , however cannot say where. Constipation * 3 days.  No other signs or symptoms noticed by HHA    Will admit for possible bronchitis vs discomfort from constipation.     Pt was supposed bobo be discharged but wasn't discharged as pt's health aid couldnt come to  the pt. Will discharge pt tomorrow.

## 2018-09-25 NOTE — DISCHARGE NOTE ADULT - REASON FOR REFUSAL (REFER PATIENT TO HEALTHCARE PROVIDER FOR FOLLOW-UP):
Patient is confused. Unable to get consent. Will follow up with the family and advise family to follow up with primary physician.

## 2018-09-25 NOTE — DISCHARGE NOTE ADULT - CARE PLAN
Principal Discharge DX:	Gurgling breath sounds  Secondary Diagnosis:	Parkinson's disease  Secondary Diagnosis:	HTN (hypertension)  Secondary Diagnosis:	HLD (hyperlipidemia)  Secondary Diagnosis:	CVA (cerebral vascular accident)  Secondary Diagnosis:	Constipation Principal Discharge DX:	Gurgling breath sounds  Goal:	To follow up with your primary care doctor in a week.  Assessment and plan of treatment:	You brought to the hospital by health aid because of gurgling breath sounds and you were admitted to the hospital for concern for bronchitis or discomfort from constipation. you had blood work up did not show any infection, you CT scan of abdomen showed gall stone, distended stomach and unchanged vertebral compression fracture. You were treated with Reglan for distended stomach. Please follow up with your primary care doctor in a week.  Secondary Diagnosis:	Parkinson's disease  Assessment and plan of treatment:	Continue all home medications for parkinson's disease and see your primary care doctor in a week.  Secondary Diagnosis:	HTN (hypertension)  Assessment and plan of treatment:	Continue all home medications for hypertension and see your primary care doctor in a week.  Secondary Diagnosis:	HLD (hyperlipidemia)  Assessment and plan of treatment:	Continue all home medications for cholesterol and see your primary care doctor in a week.  Secondary Diagnosis:	CVA (cerebral vascular accident)  Assessment and plan of treatment:	Continue all home medications for stroke and see your primary care doctor in a week.  Secondary Diagnosis:	Constipation  Assessment and plan of treatment:	Continue all home medications for constipation  and see your primary care doctor in a week.

## 2018-09-25 NOTE — DISCHARGE NOTE ADULT - MEDICATION SUMMARY - MEDICATIONS TO TAKE
I will START or STAY ON the medications listed below when I get home from the hospital:    aspirin 81 mg oral tablet, chewable  -- 1 tab(s) by mouth once a day  -- Indication: For Prophylactic measure    carbidopa-levodopa 25 mg-100 mg oral tablet  -- 1 tab(s) by mouth 2 times a day at 11:00 and 19:00   -- Indication: For Parkinson's disease    carbidopa-levodopa 25 mg-100 mg oral tablet  -- 1.5 tab(s) by mouth 2 times a day at 07:00 and 15:00  -- Indication: For Parkinson's disease    clopidogrel 75 mg oral tablet  -- 1 tab(s) by mouth once a day  -- Indication: For Prophylactic measure    amLODIPine 5 mg oral tablet  -- 1 tab(s) by mouth once a day  -- Indication: For HTN (hypertension)    docusate sodium 100 mg oral capsule  -- 1 cap(s) by mouth 2 times a day  -- Indication: For bowel regimen     senna oral tablet  -- 2 tab(s) by mouth once a day (at bedtime)  -- Indication: For bowel regimen    memantine 5 mg oral tablet  -- 1 tab(s) by mouth once a day (at bedtime)  -- Indication: For Dementia     pantoprazole 40 mg oral delayed release tablet  -- 1 tab(s) by mouth once a day (before a meal)  -- Indication: For antacid

## 2018-09-25 NOTE — PROGRESS NOTE ADULT - PROBLEM SELECTOR PLAN 1
C/w supportive measures   Bronchodilators prn   Mucomyst  Cough syrup   Dysphagia diet   Aspiration precautions  D/c reglan

## 2018-09-25 NOTE — DISCHARGE NOTE ADULT - PATIENT PORTAL LINK FT
You can access the [facility], offered by [facility], by registering with the following website: [email or web address]

## 2018-09-25 NOTE — DISCHARGE NOTE ADULT - PLAN OF CARE
To follow up with your primary care doctor in a week. You brought to the hospital by health aid because of gurgling breath sounds and you were admitted to the hospital for concern for bronchitis or discomfort from constipation. you had blood work up did not show any infection, you CT scan of abdomen showed gall stone, distended stomach and unchanged vertebral compression fracture. You were treated with Reglan for distended stomach. Please follow up with your primary care doctor in a week. Continue all home medications for parkinson's disease and see your primary care doctor in a week. Continue all home medications for hypertension and see your primary care doctor in a week. Continue all home medications for cholesterol and see your primary care doctor in a week. Continue all home medications for stroke and see your primary care doctor in a week. Continue all home medications for constipation  and see your primary care doctor in a week.

## 2018-09-26 VITALS
RESPIRATION RATE: 18 BRPM | HEART RATE: 99 BPM | DIASTOLIC BLOOD PRESSURE: 74 MMHG | TEMPERATURE: 97 F | SYSTOLIC BLOOD PRESSURE: 114 MMHG | OXYGEN SATURATION: 94 %

## 2018-09-26 LAB
ANION GAP SERPL CALC-SCNC: 7 MMOL/L — SIGNIFICANT CHANGE UP (ref 5–17)
BUN SERPL-MCNC: 19 MG/DL — HIGH (ref 7–18)
CALCIUM SERPL-MCNC: 8.9 MG/DL — SIGNIFICANT CHANGE UP (ref 8.4–10.5)
CHLORIDE SERPL-SCNC: 111 MMOL/L — HIGH (ref 96–108)
CO2 SERPL-SCNC: 26 MMOL/L — SIGNIFICANT CHANGE UP (ref 22–31)
CREAT SERPL-MCNC: 0.63 MG/DL — SIGNIFICANT CHANGE UP (ref 0.5–1.3)
GLUCOSE BLDC GLUCOMTR-MCNC: 97 MG/DL — SIGNIFICANT CHANGE UP (ref 70–99)
GLUCOSE SERPL-MCNC: 95 MG/DL — SIGNIFICANT CHANGE UP (ref 70–99)
POTASSIUM SERPL-MCNC: 3.7 MMOL/L — SIGNIFICANT CHANGE UP (ref 3.5–5.3)
POTASSIUM SERPL-SCNC: 3.7 MMOL/L — SIGNIFICANT CHANGE UP (ref 3.5–5.3)
SODIUM SERPL-SCNC: 144 MMOL/L — SIGNIFICANT CHANGE UP (ref 135–145)

## 2018-09-26 PROCEDURE — 85027 COMPLETE CBC AUTOMATED: CPT

## 2018-09-26 PROCEDURE — 80053 COMPREHEN METABOLIC PANEL: CPT

## 2018-09-26 PROCEDURE — 82962 GLUCOSE BLOOD TEST: CPT

## 2018-09-26 PROCEDURE — 99285 EMERGENCY DEPT VISIT HI MDM: CPT | Mod: 25

## 2018-09-26 PROCEDURE — 71045 X-RAY EXAM CHEST 1 VIEW: CPT

## 2018-09-26 PROCEDURE — 74176 CT ABD & PELVIS W/O CONTRAST: CPT

## 2018-09-26 PROCEDURE — 93005 ELECTROCARDIOGRAM TRACING: CPT

## 2018-09-26 PROCEDURE — 94640 AIRWAY INHALATION TREATMENT: CPT

## 2018-09-26 PROCEDURE — 87040 BLOOD CULTURE FOR BACTERIA: CPT

## 2018-09-26 PROCEDURE — 83036 HEMOGLOBIN GLYCOSYLATED A1C: CPT

## 2018-09-26 PROCEDURE — 80048 BASIC METABOLIC PNL TOTAL CA: CPT

## 2018-09-26 RX ADMIN — CARBIDOPA AND LEVODOPA 1.5 TABLET(S): 25; 100 TABLET ORAL at 06:17

## 2018-09-26 RX ADMIN — PANTOPRAZOLE SODIUM 40 MILLIGRAM(S): 20 TABLET, DELAYED RELEASE ORAL at 06:17

## 2018-09-26 RX ADMIN — Medication 100 MILLIGRAM(S): at 06:17

## 2018-09-26 RX ADMIN — Medication 3 MILLILITER(S): at 09:20

## 2018-09-26 RX ADMIN — AMLODIPINE BESYLATE 5 MILLIGRAM(S): 2.5 TABLET ORAL at 06:18

## 2018-10-01 ENCOUNTER — OUTPATIENT (OUTPATIENT)
Dept: OUTPATIENT SERVICES | Facility: HOSPITAL | Age: 68
LOS: 1 days | End: 2018-10-01
Payer: MEDICARE

## 2018-10-01 DIAGNOSIS — Z98.89 OTHER SPECIFIED POSTPROCEDURAL STATES: Chronic | ICD-10-CM

## 2018-10-01 PROCEDURE — G9001: CPT

## 2018-10-09 ENCOUNTER — EMERGENCY (EMERGENCY)
Facility: HOSPITAL | Age: 68
LOS: 1 days | Discharge: HOME | End: 2018-10-09
Attending: EMERGENCY MEDICINE
Payer: MEDICARE

## 2018-10-09 VITALS
WEIGHT: 149.91 LBS | DIASTOLIC BLOOD PRESSURE: 77 MMHG | HEART RATE: 83 BPM | OXYGEN SATURATION: 96 % | SYSTOLIC BLOOD PRESSURE: 135 MMHG | TEMPERATURE: 98 F | HEIGHT: 68 IN | RESPIRATION RATE: 17 BRPM

## 2018-10-09 DIAGNOSIS — Z98.89 OTHER SPECIFIED POSTPROCEDURAL STATES: Chronic | ICD-10-CM

## 2018-10-09 LAB
ALBUMIN SERPL ELPH-MCNC: 3.4 G/DL — LOW (ref 3.5–5)
ALP SERPL-CCNC: 102 U/L — SIGNIFICANT CHANGE UP (ref 40–120)
ALT FLD-CCNC: 8 U/L DA — LOW (ref 10–60)
AMYLASE P1 CFR SERPL: 50 U/L — SIGNIFICANT CHANGE UP (ref 25–115)
ANION GAP SERPL CALC-SCNC: 8 MMOL/L — SIGNIFICANT CHANGE UP (ref 5–17)
AST SERPL-CCNC: 12 U/L — SIGNIFICANT CHANGE UP (ref 10–40)
BASOPHILS # BLD AUTO: 0.1 K/UL — SIGNIFICANT CHANGE UP (ref 0–0.2)
BASOPHILS NFR BLD AUTO: 0.9 % — SIGNIFICANT CHANGE UP (ref 0–2)
BILIRUB SERPL-MCNC: 0.3 MG/DL — SIGNIFICANT CHANGE UP (ref 0.2–1.2)
BUN SERPL-MCNC: 14 MG/DL — SIGNIFICANT CHANGE UP (ref 7–18)
CALCIUM SERPL-MCNC: 8.7 MG/DL — SIGNIFICANT CHANGE UP (ref 8.4–10.5)
CHLORIDE SERPL-SCNC: 105 MMOL/L — SIGNIFICANT CHANGE UP (ref 96–108)
CO2 SERPL-SCNC: 28 MMOL/L — SIGNIFICANT CHANGE UP (ref 22–31)
CREAT SERPL-MCNC: 0.84 MG/DL — SIGNIFICANT CHANGE UP (ref 0.5–1.3)
EOSINOPHIL # BLD AUTO: 0.1 K/UL — SIGNIFICANT CHANGE UP (ref 0–0.5)
EOSINOPHIL NFR BLD AUTO: 1.7 % — SIGNIFICANT CHANGE UP (ref 0–6)
GLUCOSE SERPL-MCNC: 112 MG/DL — HIGH (ref 70–99)
HCT VFR BLD CALC: 39 % — SIGNIFICANT CHANGE UP (ref 39–50)
HGB BLD-MCNC: 12.9 G/DL — LOW (ref 13–17)
LIDOCAIN IGE QN: 157 U/L — SIGNIFICANT CHANGE UP (ref 73–393)
LYMPHOCYTES # BLD AUTO: 2.6 K/UL — SIGNIFICANT CHANGE UP (ref 1–3.3)
LYMPHOCYTES # BLD AUTO: 32 % — SIGNIFICANT CHANGE UP (ref 13–44)
MAGNESIUM SERPL-MCNC: 2 MG/DL — SIGNIFICANT CHANGE UP (ref 1.6–2.6)
MCHC RBC-ENTMCNC: 29.4 PG — SIGNIFICANT CHANGE UP (ref 27–34)
MCHC RBC-ENTMCNC: 33 GM/DL — SIGNIFICANT CHANGE UP (ref 32–36)
MCV RBC AUTO: 89.1 FL — SIGNIFICANT CHANGE UP (ref 80–100)
MONOCYTES # BLD AUTO: 0.7 K/UL — SIGNIFICANT CHANGE UP (ref 0–0.9)
MONOCYTES NFR BLD AUTO: 8.6 % — SIGNIFICANT CHANGE UP (ref 2–14)
NEUTROPHILS # BLD AUTO: 4.6 K/UL — SIGNIFICANT CHANGE UP (ref 1.8–7.4)
NEUTROPHILS NFR BLD AUTO: 56.9 % — SIGNIFICANT CHANGE UP (ref 43–77)
PLATELET # BLD AUTO: 234 K/UL — SIGNIFICANT CHANGE UP (ref 150–400)
POTASSIUM SERPL-MCNC: 3.8 MMOL/L — SIGNIFICANT CHANGE UP (ref 3.5–5.3)
POTASSIUM SERPL-SCNC: 3.8 MMOL/L — SIGNIFICANT CHANGE UP (ref 3.5–5.3)
PROT SERPL-MCNC: 7 G/DL — SIGNIFICANT CHANGE UP (ref 6–8.3)
RBC # BLD: 4.38 M/UL — SIGNIFICANT CHANGE UP (ref 4.2–5.8)
RBC # FLD: 13.1 % — SIGNIFICANT CHANGE UP (ref 10.3–14.5)
SODIUM SERPL-SCNC: 141 MMOL/L — SIGNIFICANT CHANGE UP (ref 135–145)
WBC # BLD: 8.2 K/UL — SIGNIFICANT CHANGE UP (ref 3.8–10.5)
WBC # FLD AUTO: 8.2 K/UL — SIGNIFICANT CHANGE UP (ref 3.8–10.5)

## 2018-10-09 PROCEDURE — 99285 EMERGENCY DEPT VISIT HI MDM: CPT

## 2018-10-09 PROCEDURE — 74018 RADEX ABDOMEN 1 VIEW: CPT | Mod: 26

## 2018-10-09 PROCEDURE — 76705 ECHO EXAM OF ABDOMEN: CPT | Mod: 26

## 2018-10-09 RX ORDER — SODIUM CHLORIDE 9 MG/ML
3 INJECTION INTRAMUSCULAR; INTRAVENOUS; SUBCUTANEOUS ONCE
Qty: 0 | Refills: 0 | Status: COMPLETED | OUTPATIENT
Start: 2018-10-09 | End: 2018-10-09

## 2018-10-09 RX ADMIN — SODIUM CHLORIDE 3 MILLILITER(S): 9 INJECTION INTRAMUSCULAR; INTRAVENOUS; SUBCUTANEOUS at 20:26

## 2018-10-09 NOTE — ED PROVIDER NOTE - MEDICAL DECISION MAKING DETAILS
Report of abd pain in patient with Hx of constipation and cholelithiasis. Will order sonogram to assess for cholecystitis, X-Ray to evaluate for obstruction and reassess. Discussed with Ms. Chadwick, who is pt's guardian appointed by state as pt has no next of kin. Also obtained Hx from [facility] pt's home health aid.

## 2018-10-09 NOTE — ED ADULT NURSE NOTE - OBJECTIVE STATEMENT
Pt is from home came to er accompanied by his HHA, pt c/o abdominal pain. as per HHA pt has gallstones and this is constant. is on Tylenol for pain. . Abdomen is soft. last BM was yesterday, Bm was hard as per HHA. pt is unable to speak. uses diaper due to incontinence. to be seen by Md. Castelan is 24 hrs.

## 2018-10-09 NOTE — ED PROVIDER NOTE - PMH
Blindness of right eye    CVA (cerebral vascular accident)    Gall stone    HLD (hyperlipidemia)    HTN (hypertension)    Parkinson's disease

## 2018-10-09 NOTE — ED PROVIDER NOTE - PROGRESS NOTE DETAILS
patient signed out to me by Dr. Mott at 2am. Awaiting CT.  CT shows moderate stool burden. Will give rx stool softener/laxative. Patient stable for discharge. YUNIOR Rodriguez MD

## 2018-10-09 NOTE — ED PROVIDER NOTE - OBJECTIVE STATEMENT
66 y/o M pt with a significant PMHx of CVA, HTN. HLD, R eye blindness, gall stones, Parkinson's disease and a significant PSHx of laminectomy BIB home health aid for discomfort to abd. Home health aid reports a normal appetite. Pt was dx with gall stones and constipation during his last visit.

## 2018-10-10 VITALS
SYSTOLIC BLOOD PRESSURE: 146 MMHG | DIASTOLIC BLOOD PRESSURE: 57 MMHG | RESPIRATION RATE: 17 BRPM | TEMPERATURE: 98 F | HEART RATE: 52 BPM | OXYGEN SATURATION: 100 %

## 2018-10-10 PROCEDURE — 85027 COMPLETE CBC AUTOMATED: CPT

## 2018-10-10 PROCEDURE — 74177 CT ABD & PELVIS W/CONTRAST: CPT

## 2018-10-10 PROCEDURE — 74177 CT ABD & PELVIS W/CONTRAST: CPT | Mod: 26

## 2018-10-10 PROCEDURE — 76705 ECHO EXAM OF ABDOMEN: CPT

## 2018-10-10 PROCEDURE — 82150 ASSAY OF AMYLASE: CPT

## 2018-10-10 PROCEDURE — 74018 RADEX ABDOMEN 1 VIEW: CPT

## 2018-10-10 PROCEDURE — 83735 ASSAY OF MAGNESIUM: CPT

## 2018-10-10 PROCEDURE — 80053 COMPREHEN METABOLIC PANEL: CPT

## 2018-10-10 PROCEDURE — 83690 ASSAY OF LIPASE: CPT

## 2018-10-10 PROCEDURE — 99284 EMERGENCY DEPT VISIT MOD MDM: CPT | Mod: 25

## 2018-10-10 RX ORDER — SENNOSIDES/DOCUSATE SODIUM 8.6MG-50MG
2 TABLET ORAL
Qty: 20 | Refills: 0 | OUTPATIENT
Start: 2018-10-10 | End: 2018-10-19

## 2018-10-15 DIAGNOSIS — Z71.89 OTHER SPECIFIED COUNSELING: ICD-10-CM

## 2019-04-18 NOTE — H&P ADULT - MUSCULOSKELETAL
PT Acute Evaluation     Therapy Triage Evaluation: OT evaluation needed due to a decline in one or more of the following: ADL independence, safety, functional ambulation, balance, strength          Pt seen on CICU nursing unit.                                                Frequency Comments: M-F    RECOMMENDATIONS FOR DISCHARGE:  Recommendation for Discharge: PT: Post acute therapy (04/18/19 1320)                                                                                                                 Admitting complaint: ST elevation myocardial infarction (STEMI), unspecified artery (CMS/HCC) [I21.3]                                     Precautions  Other Precautions: acute (L) CVA (04/18/19 1055)  Precautions Comments: at risk for falls (04/18/19 1320)    SUBJECTIVE: Patient's Personal Goal: get back to independent lifestyle (04/18/19 1320)  Subjective: Pt agreeable to session, would like to get in the chair. (04/18/19 1320)  Subjective/Objective Comments: chart reviewed.  Collaborated with PHILIPP Lu and NING Russ for session. (04/18/19 1320)    OBJECTIVE:  Basic Lines: IV;O2;NG;Murguia catheter;Telemetry;Continuous pulse oximetry;Complex lines(OK to be off restraints per RN) (04/18/19 1320)  Complex Lines: Arterial line;CVP line;Femoral line(OK for mobilty with femoral line) (04/18/19 1320)  Safety Measures: Alarms (04/18/19 1320)    RN reported Miller Fall Scale Score: 50    Co-morbidities:   Patient Active Problem List   Diagnosis   • Other follow-up examination(V67.59)   • Malignant neoplasm of prostate (CMS/HCC)       Clinical presentation: clinical presentation with unstable/unpredictable characteristics      ASSESSMENT:   Pt seen for PT Initial Evaluation during hospital stay for STEMI with cardiogenic shock and post IAPB, acute (L) CVA.  Pt has impaired command follow for full accurate neurological assessment, but his AROM is equal (B) UE and LE.  He does demonstrate impaired command follow, overall  gross inattention to environment and safety awareness, distractibility.  His activity tolerance is impaired by fatigue and tachycardia.  Anticipate need for post-acute rehab due to above deficits.  Pt requires min (A) for bed mobility and mod (A) x1/min (A) of second person for standing transfers with WW. Pt would benefit from continued skilled PT services to progress towards a higher level of independence with all functional mobility.      Other Therapeutic Intervention: education on role of PT/POC/goals (04/18/19 1320)     EDUCATION:   On this date, the patient was educated on Safety with mobility, purpose of training tasks, role of PT/POC/goals.    The response to education was: Needs reinforcement          PLAN:   Continue skilled PT, including the following Treatment/Interventions: Strengthening;Functional transfer training;Endurance training;Cognitive reorientation;Patient/Family training;Equipment eval/education;Bed mobility;Gait training;Compensatory technique education;Continued evaluation;Stairs retraining;Safety Education;Neuromuscular re-education (04/18/19 1320)   Frequency Comments: M-F (04/18/19 1320)    Treatment Plan for Next Session: bring rehab aide and progress bed mobiility, trnasfers, gait as able with WW, standing balance/postural re-education, LE coordination, monitor HR  Additional Plan Considerations: add to ICU exercise program       RECOMMENDATIONS FOR DISCHARGE:  Recommendation for Discharge: PT: Post acute therapy (04/18/19 1320)    PT/OT Mobility Equipment for Discharge: continue to assess (04/18/19 1320)  PT/OT ADL Equipment for Discharge: continue to assess (04/18/19 1055)    Assistance needed when returning home:   post-acute rehab      ICU Mobility Assesment (PERME):       Last 24 hours of Functional Data  Bed Mobility   Bed Mobility  Supine to Sit: Minimal Assist (Min);Total Assist - Dependent (04/18/19 1320)  Bed Mobility Comments: for balance, safety, cues for sequencing and  awareness of lines, second person present for safety but no physical assist needed from second person (04/18/19 1320)    Transfers  Transfers  Sit to Stand: Total Assist - Dependent (04/18/19 1320)  Stand to Sit: Total Assist - Dependent (04/18/19 1320)  Stand Pivot Transfers: Total Assist - Dependent (04/18/19 1320)  Assistive Device/: 2-wheeled walker;Gait Belt;2 People (04/18/19 1320)  Transfer Comments 1: Pt performs sit to satnd with mod (A) x1 and min (A) x1 for anterior weight shift, balance, decreasedc strength, cues for full upright posture due to fwd flexion, cues for appropriate hand placement on walker.  Pt with decreased attention and command follow is impaired, also with limited safety awareness related to lines.  Able to march in place (B) and pivot with min (A) x2 (04/18/19 1320)      Gait  Gait  Gait Comments 1: deferred due to tachycardia (04/18/19 1320),      Stairs          Neuromuscular Re-education       Balance  Balance  Sitting - Static: Supversion (Supv) (04/18/19 1320)  Sitting - Dynamic: Minimal Assist (Min)(to scoot) (04/18/19 1320)  Standing - Static: Total Assist (Total) (04/18/19 1320)  Standing - Dynamic (eyes open): Total Assist (Total) (04/18/19 1320)  Balance Comments #1: min (A) x2 at WW (04/18/19 1320)    Wheelchair Mobility       Patient's Personal Goal: get back to independent lifestyle (04/18/19 1320)    Therapy Goals:    Goals  Short Term Goals to Be Reviewed On: 04/25/19 (04/18/19 1320)  Short Term Goals = Discharge Goals: No (04/18/19 1320)  Goal Agreement: Patient agrees with goals and treatment plan (04/18/19 1320)  Bed Mobility Short Term Goal: Pt performs all bed mobility with supervision (04/18/19 1320)  Bed Mobility Discharge Goal: Pt performs all bed mobility mod (I) (04/18/19 1320)  Transfer Short Term Goal: Pt performs transfers at WW with min (A) (04/18/19 1320)  Transfer Discharge Goal: Pt performs transfers with WW mod (I) (04/18/19  1320)  Ambulation Short Term Goal: Pt ambulates 25' with WW total (A) (19 1320)  Ambulation Discharge Goal: to be determined with progress (19 1320)  Stairs Short Term Goal: to be determined with progress (19 1320)  Stairs Discharge Goal: to be determined with progress (19 1320)        PT Time Spent: 60 minutes (19 132)    See PT flowsheet for full details regarding the PT therapy provided.      Note sent for required physician co-signature: Yes         Is the patient currently receiving skilled home care services (RN or therapy) No                    Therapy Diagnosis: Weakness    Amount: 31-60 minutes  Frequency:     Duration: 10 days    Billing Information:    Timed Procedures:   , ,$ Gait Trainin-52 mins (19 0800),  , ,$ Neuromuscular Re-education: 8-22 mins (19 1117),  , , $ Therapy Activities per 15 min: 8-22 mins (19 0800),$ Therapeutic Exercise: 8-22 mins (19 1530), , ,     Untimed Procedures:   , , ,  ,  , $ PT Eval: High Complexity: 1 Procedure (19 1320),  , ,      Total Treatment Time:  PT Time Spent: 54 minutes (19 0800)    Timed Treatment Minutes:  OBS Patients Only:  PT Timed Code TX Minutes: 50 minutes (19 1320)    The co-signature indicates that the physician certifies the need for PT furnished under this plan of treatment while under his/her care.     detailed exam no joint warmth/normal/no joint erythema

## 2019-07-10 NOTE — PHYSICAL THERAPY INITIAL EVALUATION ADULT - LEVEL OF INDEPENDENCE: GAIT, REHAB EVAL
CC: Multiple Sclerosis       HPI:    Temi Howard is a very pleasant 47 y.o. female who presents today in initial neurologic consultation for multiple sclerosis. She is being referred by her primary care provider Kyle Gore M.D. she is accompanied by her  to today's visit.    Bilateral feet went numb and ascended to her legs within a week. She was hospitalized in [facility] and she was referred to Dr. Quintana. She received steroids x 3  days and a lumbar puncture. She was started on Copaxone, initially 20mg/day, and has been switched to 40mg three times a week. She has had anaphylaxis like SOB from Copaxone about 12 times in 10 years, but overall tolerates it very well.  She denies symptoms of additional relapses since the initial.    Ductal Carcinoma in Situ last year s/p double mastectomy w recon last year. Her mother and GM's had both had Breast cancer. No radiation or chemo done. She had a thyroidectomy due to thyroid cancer. History of gastric.       MS History:  Diagnosed: Feb. 2009  Lumbar puncture:  Told this was confirmatory.   First presentation: Transverse myelitis.  Disease modifying treatment:    Current: Copaxone 40 mg subcutaneous 3 times a week.   Previous: Copaxone 20 mg.  Former or other neurologist: Dr. Quintana.        ROS:   Constitutional: No fevers or chills.  Eyes: No blurry vision or eye pain.  ENT: No dysphagia or hearing loss.  Respiratory: No cough or shortness of breath.  Cardiovascular: No chest pain or palpitations.  GI: No nausea, vomiting, or diarrhea.  : No urinary incontinence or dysuria.  Musculoskeletal: No joint swelling or arthralgias.  Skin: No skin rashes.  Neuro: No headaches, dizziness, or tremors.  Endocrine: No heat or cold intolerance. No polydipsia or polyuria.  Psych: No depression or anxiety.  Heme/Lymph: No easy bruising or swollen lymph nodes.  All other review of systems were reviewed and were negative.     Past Medical History:   Past Medical  History:   Diagnosis Date   • Cancer (HCC) 2014    thyroid, breast - ductal carcinoma in situ.   • Anesthesia     PONV   • Anxiety    • Hypertension     hx of, not on any medication currently   • MS (multiple sclerosis) (HCC)    • Unspecified disorder of thyroid     cancer       Past Surgical History:   Past Surgical History:   Procedure Laterality Date   • BREAST RECONSTRUCTION Bilateral 3/14/2019    Procedure: BREAST RECONSTRUCTION;  Surgeon: Tee Hayes M.D.;  Location: SURGERY SAME DAY [facility];  Service: Plastics   • BREAST IMPLANT REVISION Bilateral 3/14/2019    Procedure: BREAST IMPLANT - REPLACE SOFT TISSUE EXPANDERS WITH PERMANENT IMPLANTS;  Surgeon: Tee Hayes M.D.;  Location: SURGERY SAME DAY [facility];  Service: Plastics   • MASTECTOMY Bilateral 11/12/2018    Procedure: MASTECTOMY- SIMPLE;  Surgeon: Ammy Cabrera M.D.;  Location: SURGERY SAME DAY [facility];  Service: General   • NODE BIOPSY SENTINEL Right 11/12/2018    Procedure: NODE BIOPSY SENTINEL- AXILLARY LYMPH;  Surgeon: Ammy Cabrera M.D.;  Location: SURGERY SAME DAY [facility];  Service: General   • BREAST RECONSTRUCTION Bilateral 11/12/2018    Procedure: BREAST RECONSTRUCTION;  Surgeon: Magdy Velázquez Jr., M.D.;  Location: SURGERY SAME DAY [facility];  Service: General   • TISSUE EXPANDER PLACE/REMOVE Bilateral 11/12/2018    Procedure: TISSUE EXPANDER PLACE/REMOVE;  Surgeon: Magdy Velázquez Jr., M.D.;  Location: SURGERY SAME DAY [facility];  Service: General   • MASS EXCISION GENERAL Right 6/9/2015    Procedure: MASS EXCISION GENERAL;  Surgeon: Dat Avery M.D.;  Location: [facility];  Service:    • THYROIDECTOMY  2/5/2014    Performed by Caitlin Mooney M.D. at [facility] SAME DAY [facility]   • THYROID LOBECTOMY  1/8/2014    Performed by Caitlin Mooney M.D. at [facility]   • GASTRIC SLEEVE LAPAROSCOPY  4/1/2013    Performed by John H Ganser, M.D. at [facility]  [facility]   • HYSTERECTOMY LAPAROSCOPY  2008   • OTHER ORTHOPEDIC SURGERY  1989    right knee arthroscopic surgery   • OTHER  [id number]    lumps removed from both breasts multiple surgeries       Social History:   Social History     Social History   • Marital status:      Spouse name: N/A   • Number of children: N/A   • Years of education: N/A     Occupational History   • Not on file.     Social History Main Topics   • Smoking status: Never Smoker   • Smokeless tobacco: Never Used   • Alcohol use No   • Drug use: No   • Sexual activity: Yes     Partners: Male     Other Topics Concern   • Not on file     Social History Narrative   • No narrative on file       Family Hx:   Family History   Problem Relation Age of Onset   • Heart Disease Unknown    • Hypertension Unknown    • Cancer Unknown    • Stroke Unknown    • Cancer Mother         Breast   • Autoimmune Disease Mother         Sjogren's   • Heart Disease Father    • No Known Problems Brother    • No Known Problems Son    • No Known Problems Son        Current Medications:   Current Outpatient Prescriptions:   •  diazePAM (VALIUM) 5 MG Tab, Take 1 Tab by mouth every 6 hours as needed for Anxiety for up to 2 doses., Disp: 2 Tab, Rfl: 0  •  Glatiramer Acetate (COPAXONE) 40 MG/ML Solution Prefilled Syringe, Inject 40 mg as instructed 3 times a week., Disp: 12 Syringe, Rfl: 11  •  Multiple Vitamins-Minerals (AIRBORNE PO), Take  by mouth., Disp: , Rfl:   •  ascorbic acid (ASCORBIC ACID) 500 MG Tab, Take 500 mg by mouth every day., Disp: , Rfl:   •  levothyroxine (SYNTHROID) 150 MCG TABS, Take 112 mcg by mouth Every morning on an empty stomach., Disp: , Rfl:   •  Glatiramer Acetate (COPAXONE) 40 MG/ML SOSY, Inject  as instructed. 3 x per week, Disp: , Rfl:   •  Multiple Vitamins-Minerals (MULTIVITAMIN PO), Take 1 Tab by mouth every day., Disp: , Rfl:   •  Cholecalciferol (VITAMIN D-3 PO), Take 4,000 Units by mouth every day., Disp: , Rfl:   •   Calcium-Magnesium-Vitamin D (CALCIUM 500 PO), Take 1,000 mg by mouth every day., Disp: , Rfl:   •  Omega 3-6-9 Fatty Acids (OMEGA-3-6-9 PO), Take 2 Caps by mouth every day. Pt unsure of dose, Disp: , Rfl:   •  Homeopathic Products (ZICAM COLD REMEDY PO), Take  by mouth., Disp: , Rfl:   •  cephALEXin (KEFLEX) 500 MG Cap, Take 1 Cap by mouth 3 times a day. (Patient not taking: Reported on 7/10/2019), Disp: 15 Cap, Rfl: 0  •  ondansetron (ZOFRAN) 4 MG Tab tablet, Take 1 Tab by mouth every 6 hours as needed for Nausea/Vomiting. (Patient not taking: Reported on 7/10/2019), Disp: 6 Tab, Rfl: 2    Allergies:   Allergies   Allergen Reactions   • Other Food      WALNUTS-sores in mouth         Physical Exam:   Vitals:    07/10/19 1122   BP: 140/84   Pulse: 77   Temp: 36.5 °C (97.7 °F)   SpO2: 98%       Constitutional: Well-developed, well-nourished, good hygiene. Appears stated age.  Cardiovascular: RRR, with no murmurs, rubs or gallops. No carotid bruits. No peripheral edema, pedal pulses intact.   Respiratory: Lungs CTA B/L, no W/R/R.   Skin: Warm, dry, intact. No rashes observed.  Eyes: Sclera anicteric.   Funduscopic: Optic discs flat with no evidence of papilledema or pallor. Normal posterior segments.  Neurologic:   Mental Status: Awake, alert, oriented x 3.   Speech: Fluent with normal prosody.   Memory: Able to recall 3 words at 1 minute and 5 minutes. Able to recall recent and remote events accurately.    Concentration: Attentive. Able to focus on history and follow multi-step commands.   Fund of Knowledge: Appropriate.   Cranial Nerves:    CN II: PERRL. No afferent pupillary defect.    CN III, IV, VI: Good eye closure, EOMI.     CN V: Facial sensation intact and symmetric.     CN VII: No facial asymmetry.     CN VIII: Hearing intact.     CN IX and X: Palate elevates symmetrically. Normal gag reflex.    CN XI: Symmetric shoulder shrug.     CN XII: Tongue midline.    Sensory: Intact light touch, vibration and  temperature.    Coordination: No evidence of past-pointing on finger to nose testing, no dysdiadochokinesia. Heel to shin intact.    DTR's: 2+ throughout without clonus.    Babinski: Toes downgoing bilaterally.   Romberg: Negative.   Movements: No tremors observed.   Musculoskeletal:    Strength: 5/5 in upper and lower extremities bilaterally.   Gait: Steady, narrow based.    Tone: Normal bulk and tone.   Joints: No swelling.     Labs Reviewed:  Results for NANDINI KAN (MRN [id number]) as of 7/9/2019 19:51   Ref. Range 3/29/2013 08:27   Vitamin B12 -True Cobalamin Latest Ref Range: 211 - 911 pg/mL 736   Folate -Folic Acid Latest Ref Range: >4.0 ng/mL >24.0   Vitamin B1 Latest Ref Range: 70 - 180 nmol/L 159   25-Hydroxy   Vitamin D 25 Latest Ref Range: 30 - 100 ng/mL 58   Pth, Intact Latest Ref Range: 14.0 - 72.0 pg/mL 19.7       Imaging:   Today I reviewed the patient's most recent MRI images with them in the examination room. I explained basic terminology of MRI's, verbalized my assessment, and answered their questions.     MRI of the brain with and without contrast from 4/18/2016.  Stable subcortical white matter lesions consistent with the provided history of MS. No evidence of new lesions or active demyelinating process.    MRI of the cervical spine with and without contrast from 4/18/2016.  Stable moderate sized T2 signal hyperintensity within the cord at the C3-C4 level consistent with a chronic demyelinating lesion appears unchanged in size and appearance. No evidence of abnormal enhancement to suggest an active demyelinating process.       MRI of the T-spine with and without contrast from 4/18/2016.  No significant abnormality is seen in the MR scan of the thoracic spine. The previously demonstrated chronic demyelinating plaque at T7-T8 is no longer visualized.    Assessment/Plan:  Multiple sclerosis (HCC)  Ms. Nandini Kan is a 47-year-old woman with a past medical history of ductal carcinoma in situ of  the breast status post mastectomy with reconstruction as well as thyroid cancer status post removal, who was diagnosed with relapsing remitting multiple sclerosis in February 2009 after an episode of transverse myelitis for which she fully recovered following 3 days of IV Solu-Medrol.  She has been on Copaxone since her diagnosis, initially the 20 mg/day dose, was switched to the 40 mg 3 times a week formulation.  Today we reviewed her MRI imaging verbal through MRI imaging.  We compared her MRIs from February 2009 to the most recent scans performed in April 2016.  There has been no change.  She initially had a T7-8 lesion which is no longer visible.  She also has a lesion at C3 and C4.  She has minimal brain involvement.  I recommended since she has been responding so beautifully to Copaxone, that she continue with the injections as long as tolerable.  We did agree to check a new MRI imaging so that we can assess its effectiveness over the past 3 years.    Plan:  1.  New MRI imaging of the brain, cervical, and thoracic spine ordered with and without contrast.  2.  Continue Copaxone 40 mg subcutaneous injections.   3.  New start form filled out for Copaxone to switch me over as the prescriber.      Greater than 50% of this 60 minute face to face encounter was devoted to disease state counseling on Multiple Sclerosis and coordination of care. During today's encounter we discussed available treatment options and their individual side effect profiles. Additional time was spent on MRI review with the patient in the exam room during which I provided education on basic radiology terminology and provided my own verbal assessment (see above assessment and plan for further details of discussion).         Julita Gudino D.O., M.P.H  MS specialist.   Board Certified Neurologist.  Neurology Clerkship Director, [facility].    Neurology,  [facility].   Tel: [phone]  Fax: [phone]     moderate assist (50% patients effort)

## 2020-06-23 NOTE — ED PROVIDER NOTE - CPE EDP GASTRO NORM
Visit Note    Zenon Soto is a 72 year old male for follow up of atrial fibrillation/long-term use of apixaban, history also significant for hypertension, AAA endovascular repair, adenocarcinoma lung.     Shortness of breath with activity, gets out of breath walking from room to room  Localized spot of chest pain unrelated to activity  No orthopnea PND palpitations syncope  Wife states that he cannot gain weight and that his blood pressures fluctuate a lot    Chronic leg pain    REVIEW OF SYSTEMS:  CONSTITUTIONAL:  No fever, chills or weight loss.  EYES:  No double vision, recent changes in vision.  HENT:  No headaches, tinnitus, throat irritation.  GASTROINTESTINAL:  No bleeds, nausea, vomiting or diarrhea, abdominal pain.  GENITOURINARY:  No hematuria.  RESPIRATORY:  No cough, wheezing or sputum production.  NEUROLOGIC:  No symptoms compatible with TIA/CVA.  SKIN:  No rashes.  MUSCULOSKELETAL:  No muscle aches or tenderness.  Endocrine:  Denies polyuria or polydipsia.  Lymphatic:  Denies swollen glands.  Psychiatric:  Denies depression or anxiety.    Patient Active Problem List    Diagnosis Date Noted   • AAA (abdominal aortic aneurysm) (CMS/[facility]) 08/05/2019     Priority: Low   • Dilated cardiomyopathy (CMS/[facility]) 08/05/2019     Priority: Low   • Hypothyroidism 08/05/2019     Priority: Low   • LOUIE (acute kidney injury) (CMS/[facility]) 08/05/2019     Priority: Low   • Acute renal failure (CMS/[facility]) 09/30/2018     Priority: Low   • Diarrhea 09/30/2018     Priority: Low   • Dehydration 04/03/2018     Priority: Low   • Cancer related pain 04/03/2018     Priority: Low   • Encounter for antineoplastic immunotherapy 03/14/2018     Priority: Low   • Back pain 10/12/2017     Priority: Low   • Elevated lactic acid level 10/12/2017     Priority: Low   • Hypotension 10/12/2017     Priority: Low   • Myelopathy (CMS/[facility]) 09/25/2017     Priority: Low   • Palliative care status - [facility] palliative care pt. 08/15/2017      Priority: Low   • Chemotherapy-induced thrombocytopenia 07/21/2017     Priority: Low   • Antineoplastic chemotherapy induced anemia 07/21/2017     Priority: Low   • Adenocarcinoma of lung, stage 4 (CMS/[facility]) 06/16/2017     Priority: Low   • Atherosclerotic PVD with intermittent claudication (CMS/[facility]) 03/27/2017     Priority: Low   • Atrial fibrillation, unspecified type (CMS/[facility]) 01/30/2017     Priority: Low   • Pain in joint of right shoulder 05/12/2016     Priority: Low   • Long term current use of anticoagulant therapy 02/04/2016     Priority: Low   • Encounter for therapeutic drug monitoring 02/04/2016     Priority: Low   • Atrial fibrillation (CMS/[facility]) 02/04/2016     Priority: Low     4/9/18 48 Holter: 24 hr Holter recording significant for atrial fibrillation with rates between 70 - 164 bpm, rare PAC's, rare PVC's. atrial fibrillation with RVR was noted.   No othersignificant tachy/rivas arrhythmia, no significant AV blocks.     2/4/16 Echo: Normal left ventricular size and systolic function, EF 60-65%. Normal left ventricular wall thickness. Aorta not well visualized. Mild aortic dilatation of the sinotubular junction. s/p endovascular AAA repair.    2/8/16 Stress: 0.4 mg IV Regadenoson/Lexiscan protocol is used. Rhythm at baseline is Normal Sinus rhythm, with Right [place] Deviation. A [place] protocol was performed. Patient tolerated the infusion well, with minor nonspecific adverse effects. No ischemic symptoms or ischemic ECG changes noted. Abnormal myocardial perfusion scans following regadenoson injection and at rest. Normal LV systolic function. Cardiac Risk Assessment: Intermediate    8/5/14 Echo: Normal left ventricular systolic function. Mildly increased left ventricular wall thickness. Grade I/IV diastolic dysfunction (abnormal relaxation filling pattern), normal to mildly elevated filling pressures. Normal right ventricular size and systolic function. Normal left atrial size. Normal right atrial  size. Mildly thickened mitral valve.Mild mitral valve regurgitation. Aortic valve sclerosis.     • Atrial fibrillation, unspecified 02/04/2016     Priority: Low   • Benign hypertension 12/03/2015     Priority: Low   • GERD (gastroesophageal reflux disease) 12/03/2015     Priority: Low   • Other postprocedural status(V45.89) 09/25/2014     Priority: Low   • Lung nodules 09/15/2014     Priority: Low   • Pain in joint, shoulder region 05/29/2014     Priority: Low   • Duodenal ulcer 05/19/2014     Priority: Low         I have reviewed the patient's medications and allergies, past medical, surgical, social and family history, updating these as appropriate.  PAST MEDICAL HISTORY:    Foraminal stenosis of lumbar region                             Comment: w/chronic LBP syndrome    Ischemic heart disease                                        PVD (peripheral vascular disease) (CMS/HCC)                   Hyperlipidemia                                                Affective disorder, major                                       Comment: w/recurrent depression    Wrist fracture                                                  Comment: left; closed reduction    GERD (gastroesophageal reflux disease)                        Gastroduodenitis                                              Ulnar neuropathy                                                Comment: LUE; persistent    Prostate nodule                                               Essential (primary) hypertension                              Coronary artery disease                                       Atherosclerotic PVD with intermittent claudica* 3/27/2017     Malignant neoplasm (CMS/HCC)                                    Comment: lung    Atrial fibrillation (CMS/HCC)                   unknown       Cystine crystals present on biopsy of liver     unknown       Arthritis                                                       Comment: back    PAST SURGICAL HISTORY:     ULNAR NERVE TRANSPOSITION                       10/15/2003      Comment: left    EXCISION OF LINGUAL TONSIL                                    HEMORRHOID SURGERY                                            CYST REMOVAL                                                    Comment: excision of postauricular cyst    KNEE SURGERY                                                    Comment: BILATERAL KNEE SCOPE    ABDOMINAL AORTIC ANEURYSM REPAIR, ENDOVASCULAR  2006    SPINAL CORD DECOMPRESSION                       2011    ULNAR NERVE TRANSPOSITION                       2005    LEFT HEART CATH,PERCUTANEOUS                    2005      Comment: Cardiac Cath    ESOPHAGOGASTRODUODENOSCOPY TRANSORAL FLEX W/BX* 2005      Comment: EGD with Bx    TONSILLECTOMY AND ADENOIDECTOMY                               PTCA                                                          LAMINECTOMY,LUMBAR                                            ROTATOR CUFF REPAIR                                             Comment: right    COLONOSCOPY                                     10/03/2017      Comment: Yousif    ESOPHAGOGASTRODUODENOSCOPY                      10/03/2017      Comment: Yousif    COLONOSCOPY                                     2020      Comment: Dr Rivas    Family History   Problem Relation Age of Onset   • Stroke Mother    • Stroke Sister    • Heart disease Sister    • Cancer Brother         pancreatic     Review of patient's family status indicates:    Mother                                           Father                                           Sister                                           Brother                                          Daughter                       Alive                     Son                            Alive                     Sister                         Alive                     Sister                         Alive                        Social History     Socioeconomic History   • Marital status: /Civil Union     Spouse name: Not on file   • Number of children: Not on file   • Years of education: Not on file   • Highest education level: Not on file   Occupational History   • Not on file   Social Needs   • Financial resource strain: Not on file   • Food insecurity:     Worry: Not on file     Inability: Not on file   • Transportation needs:     Medical: Not on file     Non-medical: Not on file   Tobacco Use   • Smoking status: Former Smoker     Packs/day: 0.25     Years: 30.00     Pack years: 7.50     Types: Cigarettes   • Smokeless tobacco: Never Used   Substance and Sexual Activity   • Alcohol use: Yes     Alcohol/week: 2.0 - 3.0 standard drinks     Types: 2 - 3 Standard drinks or equivalent per week     Frequency: Monthly or less     Drinks per session: 3 or 4     Binge frequency: Monthly     Comment: social   • Drug use: No   • Sexual activity: Not Currently     Partners: Female   Lifestyle   • Physical activity:     Days per week: Not on file     Minutes per session: Not on file   • Stress: Not on file   Relationships   • Social connections:     Talks on phone: Not on file     Gets together: Not on file     Attends Latter day service: Not on file     Active member of club or organization: Not on file     Attends meetings of clubs or organizations: Not on file     Relationship status: Not on file   • Intimate partner violence:     Fear of current or ex partner: Not on file     Emotionally abused: Not on file     Physically abused: Not on file     Forced sexual activity: Not on file   Other Topics Concern   • Not on file   Social History Narrative   • Not on file       PHYSICAL EXAMINATION:  Visit Vitals  /58 (BP Location: LUE - Left upper extremity)   Pulse 82   Ht 5' 10\" (1.778 m)   Wt 68.6 kg   BMI 21.70 kg/m²     GENERAL:  Well-developed, well-nourished, no acute distress.  EYE EXAM:  No pallor, no jaundice, no  xanthelasma.  ORAL CAVITY:  No cyanosis.  NECK:  No JVD, bruits or thyromegaly.  CHEST WALL:  No tenderness, no deformity.  LUNGS:  Bilateral rhonchi.  Respiratory effort is normal.  No respiratory distress.  CARDIOVASCULAR:  S1, S2, irregular.  No  gallops or rubs.  No precordial lifts.  Short systolic murmur  ABDOMEN:  Non tender.  Bowel sounds are present.  No bruits.  No hepatosplenomegaly.  EXTREMITIES:  No edema, cyanosis or clubbing.  MUSCULOSKELETAL:  Has difficulty walking without a walker  SKIN:  No rashes noted.  NEUROLOGICAL EXAM:  Patient is oriented x3.  Mood and affect are normal.  PSYCHIATRIC:  Speech and behavior appropriate.    06/2019 stress test with Dr. Leonard is with mildly abnormal perfusion exam an EF of 39%    02/2019 echocardiogram with Dr. Leonard is with mildly decreased EF at 45-50% and mild MR      CT chest 02/2020   1. No CTA evidence for PE.  2. Ectasia and tortuosity with diffuse plaque involving the thoracic aorta,  especially descending thoracic aorta.  3. Mild centrilobular emphysematous changes with scattered fibrotic change  and tiny nodular densities similar the prior study.  4. Presumed renal cortical cysts are present    CT chest abdomen pelvis 05/2020  No acute abnormality of the chest, abdomen or pelvis.     Aortoiliac stent graft without evidence of complication.     Renal cortical atrophy    IMPRESSION/PLAN:    72-year-old    AFib on Eliquis  Dyspnea on exertion:  Given the bilateral rhonchi high likelihood of this being secondary to his lung .    O2 saturation at rest 99 % and activity decreasing to 73% (could barely ambulate without a walker, blames it on his leg issue).  Will check pulmonary function test  Patient believes he sees a pulmonologist, will check and see who is it is and inform them about the O2 saturation changes with activity    If pulmonary workup is unremarkable, consider additional workup especially given the fact the patient's EF was 39% on stress  test.  The low EF could have been due to CAD or could have been due to AFib    Chronic leg pain,?  Neuropathy related to chemotherapy    Rakesh Schwartz MD     normal...

## 2021-03-06 NOTE — H&P ADULT - ASSESSMENT
66 y/o M pt with PMHx of Blindness of the R Eye, CVA (non-verbal at baseline), HTN, HLD, and Parkinson's Disease sent for HHA{ who is only with him for past few days and now currently  not at the bedside } for cough with sputum production  and associated with mild shortness of breath .    will admit the patient for   1]shortness of breath and concern for cough   2]parkinsons disease   3}cva   4}need for prophylactic measure Yes

## 2021-03-16 NOTE — DIETITIAN INITIAL EVALUATION ADULT. - PROBLEM SELECTOR PLAN 3
DISPLAY PLAN FREE TEXT DISPLAY PLAN FREE TEXT DISPLAY PLAN FREE TEXT DISPLAY PLAN FREE TEXT DISPLAY PLAN FREE TEXT Unable to reach pt family & to discuss goals of care  -Social work consult

## 2021-03-17 NOTE — ED PROVIDER NOTE - SKIN, MLM
[Nutrition/ Exercise/ Weight Management] : nutrition, exercise, weight management [Vitamins/Supplements] : vitamins/supplements [Breast Self Exam] : breast self exam Skin normal color for race, warm, dry and intact. No evidence of rash. no ecchymosis or hematoma

## 2021-06-16 NOTE — ED PROVIDER NOTE - PSYCHIATRIC LEVEL OF CONSCIOUSNESS
Medication refill request complete. Patient has a follow up appt with Dr. Perez in 8/2021.   Methotrexate 2.5 MG Qty 24  Folic Acid 1 MG Qty 90   Sent to [facility]  
alert/follows commands

## 2021-11-19 NOTE — PROGRESS NOTE ADULT - PROBLEM SELECTOR PLAN 1
Call TCU provider to discuss prognosis and transfer home.  CT Abdomen and Pelvis w/ IV Cont showed Large amount of stool throughout the colon may indicate constipation.  s/p fleet enema with large BM. continue Miralax, senna and colace  GI Sunil consulted -- CEA ordered and was within normal limits.  OUT PT REPEAT GI BYRD

## 2022-01-18 NOTE — PROGRESS NOTE ADULT - PROBLEM SELECTOR PROBLEM 5
HTN (hypertension)
You can access the FollowMyHealth Patient Portal offered by [facility] by registering at the following website: [email or web address]. By joining [facility]’s FollowMyHealth portal, you will also be able to view your health information using other applications (apps) compatible with our system.

## 2022-05-26 NOTE — PATIENT PROFILE ADULT. - DOES PATIENT HAVE ADVANCE DIRECTIVE
Internal Medicine  Progress Note  Cynthia Antoine MD     Subjective:     Patient is alert. Patient reports OK. Tolerating diet well no other c/o. Scheduled Meds:   sodium chloride flush  10 mL IntraVENous BID    piperacillin-tazobactam  4,500 mg IntraVENous Q8H    dexamethasone  6 mg IntraVENous Q12H    apixaban  5 mg Oral BID    metoprolol succinate  50 mg Oral BID    atorvastatin  20 mg Oral Daily     Continuous Infusions:  PRN Meds:sodium chloride flush **AND** sodium chloride flush, albuterol sulfate HFA    Objective:      Physical Exam:  Vitals:   /76   Pulse 62   Temp 98 °F (36.7 °C) (Oral)   Resp 18   Ht 5' 7\" (1.702 m)   Wt 187 lb (84.8 kg)   SpO2 94%   BMI 29.29 kg/m²   Orthostatic BPs and Heart Rates:     I/O's  No intake or output data in the 24 hours ending 05/26/22 0647  Exam:  General appearance: alert, appears stated age and cooperative  Head: Normocephalic, without obvious abnormality, atraumatic  Eyes: conjunctivae/corneas clear. PERRL, EOM's intact. Fundi benign.   Throat: lips, mucosa, and tongue normal; teeth and gums normal  Neck: no adenopathy, no carotid bruit, no JVD and supple, symmetrical, trachea midline  Back: negative  Lungs: clear to auscultation bilaterally  Chest wall: no tenderness  Heart: regular rate and rhythm  Abdomen: soft, non-tender; bowel sounds normal; no masses,  no organomegaly  Extremities: extremities normal, atraumatic, no cyanosis or edema  Pulses: 2+ and symmetric  Skin: Skin color, texture, turgor normal. No rashes or lesions  Lymph nodes: Cervical, supraclavicular, and axillary nodes normal.  Neurologic: Grossly normal      Imaging     Chest  Xray:  Results for orders placed during the hospital encounter of 05/24/22    XR CHEST (2 VW)    Narrative  EXAMINATION:  TWO XRAY VIEWS OF THE CHEST    5/24/2022 1:53 pm    COMPARISON:  Previous CT of the chest dated 09/17/2021 and previous chest x-ray of  09/20/2021    HISTORY:  [address]  PROVIDED HISTORY: Cough/SOB  TECHNOLOGIST PROVIDED HISTORY:  Reason for exam:->Cough/SOB    FINDINGS:  There are advanced emphysematous changes. There is an infiltrate seen within the right perihilar region. There is no  right or left pleural effusion. Note is made of a right MediPort catheter. There is a dual lead cardiac  pacer on the left. The heart is not enlarged. Impression  1. Left perihilar pneumonia  2. Advanced emphysematous changes    Results for orders placed during the hospital encounter of 09/17/21    XR CHEST PORTABLE    Narrative  EXAMINATION:  ONE XRAY VIEW OF THE CHEST    9/20/2021 11:05 am    COMPARISON:  09/17/2021    HISTORY:  ORDERING SYSTEM PROVIDED HISTORY: worsening dyspnea  TECHNOLOGIST PROVIDED HISTORY:  Reason for exam:->worsening dyspnea  What reading provider will be dictating this exam?->CRC    FINDINGS:  Stable position of right MediPort catheter. Left pacemaker. Cardiomediastinal silhouette is stable. Stable patchy left lung base  opacity. Possible small left pleural effusion versus pleural thickening. No  pneumothorax. No acute osseous abnormality. Impression  No significant change since the prior study      Additional Imaging:  none    Lab Review   No results found for this or any previous visit (from the past 24 hour(s)). Assessment:     Principal Problem:    Pneumonia  Active Problems:    COVID-19    COPD (chronic obstructive pulmonary disease) (HCC)    HTN (hypertension)    HLD (hyperlipidemia)    Squamous carcinoma of lung (HCC)    Pulmonary embolism (HCC)  Resolved Problems:    * No resolved hospital problems.  *      Plan:   As per ID/Pulm as to when switch to po ATB    Abraham Prado MD  5/26/2022  6:47 AM No

## 2022-11-10 NOTE — ED ADULT NURSE NOTE - NURSING [facility]
Problem: Potential for Falls  Goal: Patient will remain free of falls  Description: INTERVENTIONS:  - Educate patient/family on patient safety including physical limitations  - Instruct patient to call for assistance with activity   - Consult OT/PT to assist with strengthening/mobility   - Keep Call bell within reach  - Keep bed low and locked with side rails adjusted as appropriate  - Keep care items and personal belongings within reach  - Initiate and maintain comfort rounds  - Make Fall Risk Sign visible to staff  - Offer Toileting every 2 Hours, in advance of need  - Initiate/Maintain bed  chair alarm  - Obtain necessary fall risk management equipment: bed chair alarm, call bell, gripper sock, walker, bedside commode  - Apply yellow socks and bracelet for high fall risk patients  - Consider moving patient to room near nurses station  Outcome: Progressing     Problem: MOBILITY - ADULT  Goal: Maintain or return to baseline ADL function  Description: INTERVENTIONS:  -  Assess patient's ability to carry out ADLs; assess patient's baseline for ADL function and identify physical deficits which impact ability to perform ADLs (bathing, care of mouth/teeth, toileting, grooming, dressing, etc )  - Assess/evaluate cause of self-care deficits   - Assess range of motion  - Assess patient's mobility; develop plan if impaired  - Assess patient's need for assistive devices and provide as appropriate  - Encourage maximum independence but intervene and supervise when necessary  - Involve family in performance of ADLs  - Assess for home care needs following discharge   - Consider OT consult to assist with ADL evaluation and planning for discharge  - Provide patient education as appropriate  Outcome: Progressing  Goal: Maintains/Returns to pre admission functional level  Description: INTERVENTIONS:  - Perform BMAT or MOVE assessment daily    - Set and communicate daily mobility goal to care team and patient/family/caregiver     - Collaborate with rehabilitation services on mobility goals if consulted  - Assist patient in repositioning every 2 hours    - Dangle patient 3 times a day  - Stand patient 3 times a day  - Out of bed to chair as tolerated  - Out of bed for meals  - Out of bed for toileting  - Record patient progress and toleration of activity level   Outcome: Progressing     Problem: PAIN - ADULT  Goal: Verbalizes/displays adequate comfort level or baseline comfort level  Description: Interventions:  - Encourage patient to monitor pain and request assistance  - Assess pain using appropriate pain scale  - Administer analgesics based on type and severity of pain and evaluate response  - Implement non-pharmacological measures as appropriate and evaluate response  - Consider cultural and social influences on pain and pain management  - Notify physician/advanced practitioner if interventions unsuccessful or patient reports new pain  Outcome: Progressing     Problem: INFECTION - ADULT  Goal: Absence or prevention of progression during hospitalization  Description: INTERVENTIONS:  - Assess and monitor for signs and symptoms of infection  - Monitor lab/diagnostic results  - Monitor all insertion sites, i e  indwelling lines, tubes, and drains  - Administer medications as ordered  - Instruct and encourage patient and family to use good hand hygiene technique  Outcome: Progressing     Problem: SAFETY ADULT  Goal: Patient will remain free of falls  Description: INTERVENTIONS:  - Educate patient/family on patient safety including physical limitations  - Instruct patient to call for assistance with activity   - Consult OT/PT to assist with strengthening/mobility   - Keep Call bell within reach  - Keep bed low and locked with side rails adjusted as appropriate  - Keep care items and personal belongings within reach  - Initiate and maintain comfort rounds  - Make Fall Risk Sign visible to staff  - Offer Toileting every 2 Hours, in advance of need  - Initiate/Maintain bed  chair alarm  - Obtain necessary fall risk management equipment: bed chair alarm, call bell, gripper sock, walker, bedside commode  - Apply yellow socks and bracelet for high fall risk patients  - Consider moving patient to room near nurses station  Outcome: Progressing  Goal: Maintain or return to baseline ADL function  Description: INTERVENTIONS:  -  Assess patient's ability to carry out ADLs; assess patient's baseline for ADL function and identify physical deficits which impact ability to perform ADLs (bathing, care of mouth/teeth, toileting, grooming, dressing, etc )  - Assess/evaluate cause of self-care deficits   - Assess range of motion  - Assess patient's mobility; develop plan if impaired  - Assess patient's need for assistive devices and provide as appropriate  - Encourage maximum independence but intervene and supervise when necessary  - Involve family in performance of ADLs  - Assess for home care needs following discharge   - Consider OT consult to assist with ADL evaluation and planning for discharge  - Provide patient education as appropriate  Outcome: Progressing  Goal: Maintains/Returns to pre admission functional level  Description: INTERVENTIONS:  - Perform BMAT or MOVE assessment daily    - Set and communicate daily mobility goal to care team and patient/family/caregiver  - Collaborate with rehabilitation services on mobility goals if consulted  - Assist patient in repositioning every 2 hours    - Dangle patient 3 times a day  - Stand patient 3 times a day  - Out of bed to chair as tolerated  - Out of bed for meals  - Out of bed for toileting  - Record patient progress and toleration of activity level   Outcome: Progressing     Problem: DISCHARGE PLANNING  Goal: Discharge to home or other facility with appropriate resources  Description: INTERVENTIONS:  - Identify barriers to discharge w/patient   - Arrange for needed discharge resources and transportation as appropriate  - Identify discharge learning needs  - Refer to Case Management Department for coordinating discharge planning if the patient needs post-hospital services based on physician/advanced practitioner order   Outcome: Progressing     Problem: Knowledge Deficit  Goal: Patient/family/caregiver demonstrates understanding of disease process, treatment plan, medications, and discharge instructions  Description: Complete learning assessment and assess knowledge base    Interventions:  - Provide teaching at level of understanding  - Provide teaching via preferred learning methods  Outcome: Progressing     Problem: METABOLIC, FLUID AND ELECTROLYTES - ADULT  Goal: Electrolytes maintained within normal limits  Description: INTERVENTIONS:  - Monitor labs and assess patient for signs and symptoms of electrolyte imbalances  - Administer electrolyte replacement as ordered  - Monitor response to electrolyte replacements, including repeat lab results as appropriate  - Instruct patient on fluid and nutrition as appropriate  Outcome: Progressing  Goal: Fluid balance maintained  Description: INTERVENTIONS:  - Monitor labs   - Monitor I/O and WT  - Instruct patient on fluid and nutrition as appropriate  - Assess for signs & symptoms of volume excess or deficit  Outcome: Progressing  Goal: Glucose maintained within target range  Description: INTERVENTIONS:  - Monitor Blood Glucose as ordered  - Assess for signs and symptoms of hyperglycemia and hypoglycemia  - Administer ordered medications to maintain glucose within target range  - Assess nutritional intake and initiate nutrition service referral as needed  Outcome: Progressing     Problem: SKIN/TISSUE INTEGRITY - ADULT  Goal: Skin Integrity remains intact(Skin Breakdown Prevention)  Description: Assess:  -Perform Nicanor assessment every shift  -Clean and moisturize skin every shift  -Inspect skin when repositioning, toileting, and assisting with ADLS  -Assess under medical devices   -Assess extremities for adequate circulation and sensation     Bed Management:  -Have minimal linens on bed & keep smooth, unwrinkled  -Change linens as needed when moist or perspiring  -Avoid sitting or lying in one position for more than 2 hours while in bed    Toileting:  -Offer bedside commode  -Assess for incontinence every 2 hours  -Use incontinent care products after each incontinent episode     Activity:  -Mobilize patient 3 times a day  -Encourage or provide ROM exercises   -Turn and reposition patient every 2 Hours  -Use appropriate equipment to lift or move patient in bed  -Instruct/ Assist with weight shifting every 15 minutes when out of bed in chair  -Consider limitation of chair time 1 hour intervals    Skin Care:  -Avoid use of baby powder, tape, friction and shearing, hot water or constrictive clothing  -Relieve pressure over bony prominences using waffle cushion when in chair  -Do not massage red bony areas    Outcome: Progressing  Goal: Incision(s), wounds(s) or drain site(s) healing without S/S of infection  Description: INTERVENTIONS  - Assess and document dressing, incision, wound bed, drain sites and surrounding tissue  - Provide patient and family education  - Perform skin care/dressing changes everyday as ordered  Outcome: Progressing  Goal: Pressure injury heals and does not worsen  Description: Interventions:  - Implement low air loss mattress or specialty surface (Criteria met)  - Apply silicone foam dressing  - Instruct/assist with weight shifting every 15 minutes when in chair   - Limit chair time to 1 hour intervals  - Use special pressure reducing interventions such as waffle cushion when in chair   - Perform passive or active ROM   - Turn and reposition patient & offload bony prominences every 2 hours   - Utilize friction reducing device or surface for transfers   - Consider consults to  interdisciplinary teams such as wound care, PT/OT  - Use incontinent care products after each incontinent episode   - Consider nutrition services referral as needed  Outcome: Progressing     Problem: MUSCULOSKELETAL - ADULT  Goal: Maintain or return mobility to safest level of function  Description: INTERVENTIONS:  - Assess patient's ability to carry out ADLs; assess patient's baseline for ADL function and identify physical deficits which impact ability to perform ADLs (bathing, care of mouth/teeth, toileting, grooming, dressing, etc )  - Assess/evaluate cause of self-care deficits   - Assess range of motion  - Assess patient's mobility  - Assess patient's need for assistive devices and provide as appropriate  - Encourage maximum independence but intervene and supervise when necessary  - Involve family in performance of ADLs  - Assess for home care needs following discharge   - Consider OT consult to assist with ADL evaluation and planning for discharge  - Provide patient education as appropriate  Outcome: Progressing  Goal: Maintain proper alignment of affected body part  Description: INTERVENTIONS:  - Support, maintain and protect limb and body alignment  - Provide patient/ family with appropriate education  Outcome: Progressing     Problem: CARDIOVASCULAR - ADULT  Goal: Maintains optimal cardiac output and hemodynamic stability  Description: INTERVENTIONS:  - Monitor I/O, vital signs and rhythm  - Monitor for S/S and trends of decreased cardiac output  - Administer and titrate ordered vasoactive medications to optimize hemodynamic stability  - Assess quality of pulses, skin color and temperature  - Assess for signs of decreased coronary artery perfusion  - Instruct patient to report change in severity of symptoms  Outcome: Progressing     Problem: RESPIRATORY - ADULT  Goal: Achieves optimal ventilation and oxygenation  Description: INTERVENTIONS:  - Assess for changes in respiratory status  - Assess for changes in mentation and behavior  - Position to facilitate oxygenation and minimize respiratory effort  - Oxygen administered by appropriate delivery if ordered  - Initiate smoking cessation education as indicated  - Encourage broncho-pulmonary hygiene including cough, deep breathe, Incentive Spirometry  - Assess the need for suctioning and aspirate as needed  - Assess and instruct to report SOB or any respiratory difficulty  - Respiratory Therapy support as indicated  Outcome: Progressing     Problem: Prexisting or High Potential for Compromised Skin Integrity  Goal: Skin integrity is maintained or improved  Description: INTERVENTIONS:  - Identify patients at risk for skin breakdown  - Assess and monitor skin integrity  - Assess and monitor nutrition and hydration status  - Monitor labs   - Assess for incontinence   - Turn and reposition patient  - Assist with mobility/ambulation  - Relieve pressure over bony prominences  - Avoid friction and shearing  - Provide appropriate hygiene as needed including keeping skin clean and dry  - Evaluate need for skin moisturizer/barrier cream  - Collaborate with interdisciplinary team   - Patient/family teaching  - Consider wound care consult   Outcome: Progressing - - -

## 2022-12-01 NOTE — PROGRESS NOTE ADULT - PROBLEM SELECTOR PLAN 5
Continue with aspirin , plavix , not on statin at home   Consider statin Universal Safety Interventions

## 2023-04-21 NOTE — PATIENT PROFILE ADULT. - AS SC BRADEN FRICTION
Family requesting update from doctor. Called Dr. Daniel Empmiker and left voicemail.     Criss Wright RN (1) problem

## 2023-07-13 NOTE — PROGRESS NOTE ADULT - PROBLEM SELECTOR PLAN 8
IMPROVE VTE Individual Risk Assessment    RISK                                                                Points    [  ] Previous VTE                                                  3  [  ] Thrombophilia                                               2  [  ] Lower limb paralysis                                      2        (unable to hold up >15 seconds)    [  ] Current Cancer                                              2         (within 6 months)  [ x ] Immobilization > 24 hrs                                1  [  ] ICU/CCU stay > 24 hours                              1  [ x] Age > 60                                                      1  IMPROVE VTE Score _____2____  - DVT ppx with lovenox 40 daily  -GI ppx with pantoprazole 40 mg daily
Epidermal Autograft Text: The defect edges were debeveled with a #15 scalpel blade.  Given the location of the defect, shape of the defect and the proximity to free margins an epidermal autograft was deemed most appropriate.  Using a sterile surgical marker, the primary defect shape was transferred to the donor site. The epidermal graft was then harvested.  The skin graft was then placed in the primary defect and oriented appropriately.

## 2023-09-19 NOTE — ED ADULT TRIAGE NOTE - TEMPERATURE IN CELSIUS (DEGREES C)
37.1 Simponi Pregnancy And Lactation Text: The risk during pregnancy and breastfeeding is uncertain with this medication.

## 2023-10-26 NOTE — ED PROVIDER NOTE - CARE PLAN
Rituxan Pregnancy And Lactation Text: This medication is Pregnancy Category C and it isn't know if it is safe during pregnancy. It is unknown if this medication is excreted in breast milk but similar antibodies are known to be excreted. Principal Discharge DX:	Constipation

## 2023-10-30 NOTE — DISCHARGE NOTE ADULT - FUNCTIONAL SCREEN CURRENT LEVEL: DRESSING, MLM
Patient on Jardiance 25 qd and Metformin 1000 BID    - ISS (4) completely dependent CT neck with evidence of thyroid nodule   ENT evaluated felt not mass and can be eval outpatient  will f/u outpatient CT neck with evidence of thyroid nodule   ENT evaluated felt no concern for mass and can be eval with outpt thyroid US and TFTs  will f/u outpatient

## 2024-02-01 NOTE — PATIENT PROFILE ADULT. - PATIENT REPRESENTATIVE NAME
Skin lesions on back, abdomen. Annular eczema v tinea.   Will start with triamcinolone. If no improvement after a few days switch to antifungal  
Britt Chadwick (info from Face Sheet)

## 2024-08-20 NOTE — ED ADULT NURSE NOTE - ED COMFORT CARE
Occupational Therapy    Patient not seen in therapy.     Patient scheduled for OR this morning. Will reattempt post op pending clearance and new orders.       OBJECTIVE                          Therapy procedure time and total treatment time can be found documented on the Time Entry flowsheet   Patient informed

## 2024-10-14 NOTE — DISCHARGE NOTE ADULT - LAUNCH MEDICATION RECONCILIATION
Called and reveiwed results of the MRI knee; suhkri will be going to orthopedics tomorrow for further evaluation.      Electronically signed by Bruno Coppola Jr, DO on 10/14/2024 at 3:14 PM     <<-----Click here for Discharge Medication Review

## 2025-05-09 NOTE — SWALLOW BEDSIDE ASSESSMENT ADULT - SWALLOW EVAL: STRUCTURAL ABNORMALITIES
Anesthesia Transfer of Care Note    Patient: Faisal Quiros    Procedure(s) Performed: Procedure(s) (LRB):  EGD (ESOPHAGOGASTRODUODENOSCOPY) (N/A)  SIGMOIDOSCOPY, FLEXIBLE (N/A)    Patient location: PACU    Anesthesia Type: general    Transport from OR: Transported from OR on room air with adequate spontaneous ventilation    Post pain: adequate analgesia    Post assessment: no apparent anesthetic complications    Post vital signs: stable    Level of consciousness: awake, alert and oriented    Nausea/Vomiting: no nausea/vomiting    Complications: none    Transfer of care protocol was followed    Last vitals: Visit Vitals  BP (!) 166/92   Pulse (!) 59   Temp 36.7 °C (98 °F) (Oral)   Resp 18   SpO2 98%     
none present
none present